# Patient Record
Sex: MALE | Race: WHITE | NOT HISPANIC OR LATINO | Employment: FULL TIME | URBAN - NONMETROPOLITAN AREA
[De-identification: names, ages, dates, MRNs, and addresses within clinical notes are randomized per-mention and may not be internally consistent; named-entity substitution may affect disease eponyms.]

---

## 2018-07-25 ENCOUNTER — OFFICE VISIT (OUTPATIENT)
Dept: MEDICAL GROUP | Facility: CLINIC | Age: 58
End: 2018-07-25
Payer: MEDICAID

## 2018-07-25 VITALS
BODY MASS INDEX: 23.62 KG/M2 | TEMPERATURE: 98.1 F | OXYGEN SATURATION: 95 % | RESPIRATION RATE: 18 BRPM | SYSTOLIC BLOOD PRESSURE: 124 MMHG | HEART RATE: 90 BPM | DIASTOLIC BLOOD PRESSURE: 84 MMHG | WEIGHT: 165 LBS | HEIGHT: 70 IN

## 2018-07-25 DIAGNOSIS — R03.0 ELEVATED BLOOD PRESSURE READING: ICD-10-CM

## 2018-07-25 DIAGNOSIS — M51.9 LUMBAR DISC DISEASE: ICD-10-CM

## 2018-07-25 DIAGNOSIS — Z12.11 COLON CANCER SCREENING: ICD-10-CM

## 2018-07-25 PROCEDURE — 99203 OFFICE O/P NEW LOW 30 MIN: CPT | Performed by: FAMILY MEDICINE

## 2018-07-25 RX ORDER — IBUPROFEN 200 MG
200 TABLET ORAL EVERY 6 HOURS PRN
Status: ON HOLD | COMMUNITY
End: 2018-09-20 | Stop reason: CLARIF

## 2018-07-25 ASSESSMENT — PATIENT HEALTH QUESTIONNAIRE - PHQ9
SUM OF ALL RESPONSES TO PHQ QUESTIONS 1-9: 3
5. POOR APPETITE OR OVEREATING: 0 - NOT AT ALL
CLINICAL INTERPRETATION OF PHQ2 SCORE: 2

## 2018-07-25 NOTE — PROGRESS NOTES
Complaint: High BP     Subjective:     Tomás Urrutia is a 58 y.o. male here today to establish care. Moved here from [facility] last winter. Currently working as  .    Lumbar disc disease  Has multilevel lumbar disc disease. Has been getting epidurals with relief, last one in [place] a couple weeks ago. Never seen by ortho or neurosurgery. Wants to know what other options are available apart from pain management. Has been off opiates for years. States he can't sit for hours, used to work as .    Elevated blood pressure reading  At last epidural procedure. BP found to be elevated 142-146/90-92. Was in lots of pain at the time.     Wants me to check a growth on right cheek.    Current medicines (including changes today)  Current Outpatient Prescriptions   Medication Sig Dispense Refill   • ibuprofen (MOTRIN) 200 MG Tab Take 200 mg by mouth every 6 hours as needed.       No current facility-administered medications for this visit.      He  has a past medical history of Back pain.    Health Maintenance: needs colonoscopy      Allergies: Patient has no known allergies.    Current Outpatient Prescriptions Ordered in [facility]   Medication Sig Dispense Refill   • ibuprofen (MOTRIN) 200 MG Tab Take 200 mg by mouth every 6 hours as needed.       No current Epic-ordered facility-administered medications on file.        Past Medical History:   Diagnosis Date   • Back pain        Past Surgical History:   Procedure Laterality Date   • OTHER ORTHOPEDIC SURGERY      tendon surgeries left hand       Social History   Substance Use Topics   • Smoking status: Never Smoker   • Smokeless tobacco: Never Used   • Alcohol use Yes      Comment: occasional       Social History     Social History Narrative   • No narrative on file       Family History   Problem Relation Age of Onset   • Stroke Father    • Stroke Paternal Grandmother          ROS   Patient denies any fever, chills, unintentional weight gain/loss, fatigue, stroke  "symptoms, dizziness, headache, nasal congestion, sore-throat, cough, heartburn, chest pain, difficulty breathing, abdominal discomfort, diarrhea/constipation, burning with urination or frequency, joint or back pain, skin rashes, depression or anxiety.       Objective:     Blood pressure 124/84, pulse 90, temperature 36.7 °C (98.1 °F), resp. rate 18, height 1.778 m (5' 10\"), weight 74.8 kg (165 lb), SpO2 95 %. Body mass index is 23.68 kg/m².   Physical Exam:  Constitutional: Alert, no distress.  Skin: Warm, dry, good turgor, no rashes in visible areas. 0.3 cm oval-shaped, raised pink lesion with some teleangiectases over right mandible.  Neck: Trachea midline, no masses, no thyromegaly. No cervical or supraclavicular lymphadenopathy  Respiratory: Unlabored respiratory effort, lungs clear to auscultation, no wheezes, no ronchi.  Cardiovascular: Normal S1, S2, no murmur, no extremity edema.  Psych: Alert and oriented x3, appropriate affect and mood.        Assessment and Plan:   The following treatment plan was discussed    1. Lumbar disc disease  Chronic problem. Will refer him to ortho. Should get 2nd opinion if operation advised. May need referral to PM& R as well. P  - REFERRAL TO ORTHOPEDICS    2. Colon cancer screening  Will notify with result.  - OCCULT BLOOD FECES IMMUNOASSAY (FIT); Future    3. Elevated blood pressure reading  New problem. Observe. Pt to monitor at rest (pharmacy, [facility]), report back if consistently elevated.     Pt to monitor lesion on cheek, return if grows in size or sxs appear, will then biopsy.    Followup: Return if symptoms worsen or fail to improve.    Please note that this dictation was created using voice recognition software. I have made every reasonable attempt to correct obvious errors, but I expect that there are errors of grammar and possibly content that I did not discover before finalizing the note.           "

## 2018-07-25 NOTE — ASSESSMENT & PLAN NOTE
Has multilevel lumbar disc disease. Has been getting epidurals with relief, last one in [place] a couple weeks ago. Never seen by ortho or neurosurgery. Wants to know what other options are available apart from pain management. Has been off opiates for years. States he can't sit for hours, used to work as .

## 2018-07-25 NOTE — ASSESSMENT & PLAN NOTE
At last epidural procedure. BP found to be elevated 142-146/90-92. Was in lots of pain at the time.

## 2018-08-01 ENCOUNTER — HOSPITAL ENCOUNTER (OUTPATIENT)
Facility: MEDICAL CENTER | Age: 58
End: 2018-08-01
Attending: FAMILY MEDICINE
Payer: MEDICAID

## 2018-08-01 PROCEDURE — 82274 ASSAY TEST FOR BLOOD FECAL: CPT

## 2018-08-06 DIAGNOSIS — Z12.11 COLON CANCER SCREENING: ICD-10-CM

## 2018-08-07 LAB — HEMOCCULT STL QL IA: NEGATIVE

## 2018-09-14 ENCOUNTER — HOSPITAL ENCOUNTER (OUTPATIENT)
Dept: RADIOLOGY | Facility: MEDICAL CENTER | Age: 58
DRG: 460 | End: 2018-09-14
Attending: ORTHOPAEDIC SURGERY | Admitting: ORTHOPAEDIC SURGERY
Payer: MEDICAID

## 2018-09-14 DIAGNOSIS — Z01.810 PRE-OPERATIVE CARDIOVASCULAR EXAMINATION: ICD-10-CM

## 2018-09-14 DIAGNOSIS — Z01.812 PRE-OPERATIVE LABORATORY EXAMINATION: ICD-10-CM

## 2018-09-14 DIAGNOSIS — Z01.811 PRE-OPERATIVE RESPIRATORY EXAMINATION: ICD-10-CM

## 2018-09-14 LAB
ANION GAP SERPL CALC-SCNC: 6 MMOL/L (ref 0–11.9)
APPEARANCE UR: CLEAR
APTT PPP: 27 SEC (ref 24.7–36)
BASOPHILS # BLD AUTO: 0.5 % (ref 0–1.8)
BASOPHILS # BLD: 0.03 K/UL (ref 0–0.12)
BILIRUB UR QL STRIP.AUTO: NEGATIVE
BUN SERPL-MCNC: 12 MG/DL (ref 8–22)
CALCIUM SERPL-MCNC: 9.7 MG/DL (ref 8.5–10.5)
CHLORIDE SERPL-SCNC: 102 MMOL/L (ref 96–112)
CO2 SERPL-SCNC: 30 MMOL/L (ref 20–33)
COLOR UR: YELLOW
CREAT SERPL-MCNC: 0.83 MG/DL (ref 0.5–1.4)
EKG IMPRESSION: NORMAL
EOSINOPHIL # BLD AUTO: 0.12 K/UL (ref 0–0.51)
EOSINOPHIL NFR BLD: 1.9 % (ref 0–6.9)
ERYTHROCYTE [DISTWIDTH] IN BLOOD BY AUTOMATED COUNT: 44.5 FL (ref 35.9–50)
ERYTHROCYTE [SEDIMENTATION RATE] IN BLOOD BY WESTERGREN METHOD: 2 MM/HOUR (ref 0–20)
GLUCOSE SERPL-MCNC: 90 MG/DL (ref 65–99)
GLUCOSE UR STRIP.AUTO-MCNC: NEGATIVE MG/DL
HCT VFR BLD AUTO: 44.4 % (ref 42–52)
HGB BLD-MCNC: 15.3 G/DL (ref 14–18)
IMM GRANULOCYTES # BLD AUTO: 0.02 K/UL (ref 0–0.11)
IMM GRANULOCYTES NFR BLD AUTO: 0.3 % (ref 0–0.9)
INR PPP: 0.92 (ref 0.87–1.13)
KETONES UR STRIP.AUTO-MCNC: NEGATIVE MG/DL
LEUKOCYTE ESTERASE UR QL STRIP.AUTO: NEGATIVE
LYMPHOCYTES # BLD AUTO: 1.94 K/UL (ref 1–4.8)
LYMPHOCYTES NFR BLD: 30.1 % (ref 22–41)
MCH RBC QN AUTO: 33.3 PG (ref 27–33)
MCHC RBC AUTO-ENTMCNC: 34.5 G/DL (ref 33.7–35.3)
MCV RBC AUTO: 96.7 FL (ref 81.4–97.8)
MICRO URNS: NORMAL
MONOCYTES # BLD AUTO: 0.7 K/UL (ref 0–0.85)
MONOCYTES NFR BLD AUTO: 10.9 % (ref 0–13.4)
NEUTROPHILS # BLD AUTO: 3.64 K/UL (ref 1.82–7.42)
NEUTROPHILS NFR BLD: 56.3 % (ref 44–72)
NITRITE UR QL STRIP.AUTO: NEGATIVE
NRBC # BLD AUTO: 0 K/UL
NRBC BLD-RTO: 0 /100 WBC
PH UR STRIP.AUTO: 6.5 [PH]
PLATELET # BLD AUTO: 210 K/UL (ref 164–446)
PMV BLD AUTO: 10 FL (ref 9–12.9)
POTASSIUM SERPL-SCNC: 4.2 MMOL/L (ref 3.6–5.5)
PROT UR QL STRIP: NEGATIVE MG/DL
PROTHROMBIN TIME: 12.1 SEC (ref 12–14.6)
RBC # BLD AUTO: 4.59 M/UL (ref 4.7–6.1)
RBC UR QL AUTO: NEGATIVE
SODIUM SERPL-SCNC: 138 MMOL/L (ref 135–145)
SP GR UR STRIP.AUTO: 1.01
UROBILINOGEN UR STRIP.AUTO-MCNC: 0.2 MG/DL
WBC # BLD AUTO: 6.5 K/UL (ref 4.8–10.8)

## 2018-09-14 PROCEDURE — 85652 RBC SED RATE AUTOMATED: CPT

## 2018-09-14 PROCEDURE — 80048 BASIC METABOLIC PNL TOTAL CA: CPT

## 2018-09-14 PROCEDURE — 93005 ELECTROCARDIOGRAM TRACING: CPT

## 2018-09-14 PROCEDURE — 36415 COLL VENOUS BLD VENIPUNCTURE: CPT

## 2018-09-14 PROCEDURE — 85730 THROMBOPLASTIN TIME PARTIAL: CPT

## 2018-09-14 PROCEDURE — 85025 COMPLETE CBC W/AUTO DIFF WBC: CPT

## 2018-09-14 PROCEDURE — 85610 PROTHROMBIN TIME: CPT

## 2018-09-14 PROCEDURE — 81003 URINALYSIS AUTO W/O SCOPE: CPT

## 2018-09-14 PROCEDURE — 71046 X-RAY EXAM CHEST 2 VIEWS: CPT

## 2018-09-14 PROCEDURE — 93010 ELECTROCARDIOGRAM REPORT: CPT | Performed by: INTERNAL MEDICINE

## 2018-09-14 RX ORDER — HYDROCODONE BITARTRATE AND ACETAMINOPHEN 10; 325 MG/1; MG/1
1-2 TABLET ORAL EVERY 6 HOURS PRN
Status: ON HOLD | COMMUNITY
End: 2018-09-23

## 2018-09-14 RX ORDER — ACETAMINOPHEN 500 MG
500 TABLET ORAL EVERY 6 HOURS PRN
COMMUNITY

## 2018-09-20 ENCOUNTER — APPOINTMENT (OUTPATIENT)
Dept: RADIOLOGY | Facility: MEDICAL CENTER | Age: 58
DRG: 460 | End: 2018-09-20
Attending: ORTHOPAEDIC SURGERY
Payer: MEDICAID

## 2018-09-20 ENCOUNTER — HOSPITAL ENCOUNTER (INPATIENT)
Facility: MEDICAL CENTER | Age: 58
LOS: 3 days | DRG: 460 | End: 2018-09-23
Attending: ORTHOPAEDIC SURGERY | Admitting: ORTHOPAEDIC SURGERY
Payer: MEDICAID

## 2018-09-20 DIAGNOSIS — M54.9 BACK PAIN WITHOUT RADIATION: ICD-10-CM

## 2018-09-20 DIAGNOSIS — M48.061 SPINAL STENOSIS OF LUMBAR REGION, UNSPECIFIED WHETHER NEUROGENIC CLAUDICATION PRESENT: ICD-10-CM

## 2018-09-20 DIAGNOSIS — G89.18 ACUTE POST-OPERATIVE PAIN: ICD-10-CM

## 2018-09-20 DIAGNOSIS — M51.9 LUMBAR DISC DISEASE: ICD-10-CM

## 2018-09-20 PROCEDURE — 160036 HCHG PACU - EA ADDL 30 MINS PHASE I: Performed by: ORTHOPAEDIC SURGERY

## 2018-09-20 PROCEDURE — 700105 HCHG RX REV CODE 258: Performed by: ORTHOPAEDIC SURGERY

## 2018-09-20 PROCEDURE — 501838 HCHG SUTURE GENERAL: Performed by: ORTHOPAEDIC SURGERY

## 2018-09-20 PROCEDURE — 700101 HCHG RX REV CODE 250

## 2018-09-20 PROCEDURE — 700101 HCHG RX REV CODE 250: Performed by: ORTHOPAEDIC SURGERY

## 2018-09-20 PROCEDURE — 160042 HCHG SURGERY MINUTES - EA ADDL 1 MIN LEVEL 5: Performed by: ORTHOPAEDIC SURGERY

## 2018-09-20 PROCEDURE — 72100 X-RAY EXAM L-S SPINE 2/3 VWS: CPT

## 2018-09-20 PROCEDURE — A9270 NON-COVERED ITEM OR SERVICE: HCPCS | Performed by: ORTHOPAEDIC SURGERY

## 2018-09-20 PROCEDURE — 700102 HCHG RX REV CODE 250 W/ 637 OVERRIDE(OP)

## 2018-09-20 PROCEDURE — 503051 HCHG CLOSURE PRINEO SKIN: Performed by: ORTHOPAEDIC SURGERY

## 2018-09-20 PROCEDURE — 07DR3ZZ EXTRACTION OF ILIAC BONE MARROW, PERCUTANEOUS APPROACH: ICD-10-PCS | Performed by: ORTHOPAEDIC SURGERY

## 2018-09-20 PROCEDURE — 95937 NEUROMUSCULAR JUNCTION TEST: CPT | Performed by: ORTHOPAEDIC SURGERY

## 2018-09-20 PROCEDURE — 0SG10AJ FUSION OF 2 OR MORE LUMBAR VERTEBRAL JOINTS WITH INTERBODY FUSION DEVICE, POSTERIOR APPROACH, ANTERIOR COLUMN, OPEN APPROACH: ICD-10-PCS | Performed by: ORTHOPAEDIC SURGERY

## 2018-09-20 PROCEDURE — 770006 HCHG ROOM/CARE - MED/SURG/GYN SEMI*

## 2018-09-20 PROCEDURE — A4450 NON-WATERPROOF TAPE: HCPCS | Performed by: ORTHOPAEDIC SURGERY

## 2018-09-20 PROCEDURE — 4A11X4G MONITORING OF PERIPHERAL NERVOUS ELECTRICAL ACTIVITY, INTRAOPERATIVE, EXTERNAL APPROACH: ICD-10-PCS | Performed by: ORTHOPAEDIC SURGERY

## 2018-09-20 PROCEDURE — A9270 NON-COVERED ITEM OR SERVICE: HCPCS | Performed by: ANESTHESIOLOGY

## 2018-09-20 PROCEDURE — 700101 HCHG RX REV CODE 250: Mod: JW

## 2018-09-20 PROCEDURE — 95925 SOMATOSENSORY TESTING: CPT | Performed by: ORTHOPAEDIC SURGERY

## 2018-09-20 PROCEDURE — 0ST20ZZ RESECTION OF LUMBAR VERTEBRAL DISC, OPEN APPROACH: ICD-10-PCS | Performed by: ORTHOPAEDIC SURGERY

## 2018-09-20 PROCEDURE — 160048 HCHG OR STATISTICAL LEVEL 1-5: Performed by: ORTHOPAEDIC SURGERY

## 2018-09-20 PROCEDURE — 700102 HCHG RX REV CODE 250 W/ 637 OVERRIDE(OP): Performed by: ORTHOPAEDIC SURGERY

## 2018-09-20 PROCEDURE — A4314 CATH W/DRAINAGE 2-WAY LATEX: HCPCS | Performed by: ORTHOPAEDIC SURGERY

## 2018-09-20 PROCEDURE — 500858 HCHG NEEDLE, KYPHOPLASTY 11GA: Performed by: ORTHOPAEDIC SURGERY

## 2018-09-20 PROCEDURE — A9270 NON-COVERED ITEM OR SERVICE: HCPCS

## 2018-09-20 PROCEDURE — 3E0234Z INTRODUCTION OF SERUM, TOXOID AND VACCINE INTO MUSCLE, PERCUTANEOUS APPROACH: ICD-10-PCS | Performed by: ORTHOPAEDIC SURGERY

## 2018-09-20 PROCEDURE — 700111 HCHG RX REV CODE 636 W/ 250 OVERRIDE (IP): Performed by: ORTHOPAEDIC SURGERY

## 2018-09-20 PROCEDURE — 160035 HCHG PACU - 1ST 60 MINS PHASE I: Performed by: ORTHOPAEDIC SURGERY

## 2018-09-20 PROCEDURE — 110454 HCHG SHELL REV 250: Performed by: ORTHOPAEDIC SURGERY

## 2018-09-20 PROCEDURE — 502000 HCHG MISC OR IMPLANTS RC 0278: Performed by: ORTHOPAEDIC SURGERY

## 2018-09-20 PROCEDURE — 01NB0ZZ RELEASE LUMBAR NERVE, OPEN APPROACH: ICD-10-PCS | Performed by: ORTHOPAEDIC SURGERY

## 2018-09-20 PROCEDURE — 500423 HCHG DRESSING, ABD COMBINE: Performed by: ORTHOPAEDIC SURGERY

## 2018-09-20 PROCEDURE — 51798 US URINE CAPACITY MEASURE: CPT

## 2018-09-20 PROCEDURE — 160002 HCHG RECOVERY MINUTES (STAT): Performed by: ORTHOPAEDIC SURGERY

## 2018-09-20 PROCEDURE — 700102 HCHG RX REV CODE 250 W/ 637 OVERRIDE(OP): Performed by: ANESTHESIOLOGY

## 2018-09-20 PROCEDURE — 500885 HCHG PACK, JACKSON TABLE: Performed by: ORTHOPAEDIC SURGERY

## 2018-09-20 PROCEDURE — 160009 HCHG ANES TIME/MIN: Performed by: ORTHOPAEDIC SURGERY

## 2018-09-20 PROCEDURE — 160031 HCHG SURGERY MINUTES - 1ST 30 MINS LEVEL 5: Performed by: ORTHOPAEDIC SURGERY

## 2018-09-20 PROCEDURE — 700111 HCHG RX REV CODE 636 W/ 250 OVERRIDE (IP): Performed by: ANESTHESIOLOGY

## 2018-09-20 PROCEDURE — 95940 IONM IN OPERATNG ROOM 15 MIN: CPT | Performed by: ORTHOPAEDIC SURGERY

## 2018-09-20 PROCEDURE — 700111 HCHG RX REV CODE 636 W/ 250 OVERRIDE (IP)

## 2018-09-20 PROCEDURE — A6223 GAUZE >16<=48 NO W/SAL W/O B: HCPCS | Performed by: ORTHOPAEDIC SURGERY

## 2018-09-20 PROCEDURE — 95861 NEEDLE EMG 2 EXTREMITIES: CPT | Performed by: ORTHOPAEDIC SURGERY

## 2018-09-20 PROCEDURE — 95909 NRV CNDJ TST 5-6 STUDIES: CPT | Performed by: ORTHOPAEDIC SURGERY

## 2018-09-20 RX ORDER — PROMETHAZINE HYDROCHLORIDE 25 MG/1
12.5-25 TABLET ORAL EVERY 4 HOURS PRN
Status: DISCONTINUED | OUTPATIENT
Start: 2018-09-20 | End: 2018-09-23 | Stop reason: HOSPADM

## 2018-09-20 RX ORDER — BUPIVACAINE HYDROCHLORIDE AND EPINEPHRINE 5; 5 MG/ML; UG/ML
INJECTION, SOLUTION EPIDURAL; INTRACAUDAL; PERINEURAL
Status: DISCONTINUED | OUTPATIENT
Start: 2018-09-20 | End: 2018-09-20 | Stop reason: HOSPADM

## 2018-09-20 RX ORDER — MORPHINE SULFATE 4 MG/ML
4 INJECTION, SOLUTION INTRAMUSCULAR; INTRAVENOUS
Status: DISCONTINUED | OUTPATIENT
Start: 2018-09-20 | End: 2018-09-21

## 2018-09-20 RX ORDER — ZOLPIDEM TARTRATE 5 MG/1
5 TABLET ORAL
Status: DISCONTINUED | OUTPATIENT
Start: 2018-09-21 | End: 2018-09-23 | Stop reason: HOSPADM

## 2018-09-20 RX ORDER — DIPHENHYDRAMINE HYDROCHLORIDE 50 MG/ML
12.5 INJECTION INTRAMUSCULAR; INTRAVENOUS
Status: DISCONTINUED | OUTPATIENT
Start: 2018-09-20 | End: 2018-09-20 | Stop reason: HOSPADM

## 2018-09-20 RX ORDER — HYDRALAZINE HYDROCHLORIDE 20 MG/ML
10 INJECTION INTRAMUSCULAR; INTRAVENOUS
Status: DISCONTINUED | OUTPATIENT
Start: 2018-09-20 | End: 2018-09-23 | Stop reason: HOSPADM

## 2018-09-20 RX ORDER — CEFAZOLIN SODIUM 2 G/100ML
2 INJECTION, SOLUTION INTRAVENOUS EVERY 8 HOURS
Status: COMPLETED | OUTPATIENT
Start: 2018-09-20 | End: 2018-09-21

## 2018-09-20 RX ORDER — CLONIDINE HYDROCHLORIDE 0.1 MG/1
0.1 TABLET ORAL EVERY 4 HOURS PRN
Status: DISCONTINUED | OUTPATIENT
Start: 2018-09-20 | End: 2018-09-23 | Stop reason: HOSPADM

## 2018-09-20 RX ORDER — ONDANSETRON 4 MG/1
4 TABLET, ORALLY DISINTEGRATING ORAL EVERY 4 HOURS PRN
Status: DISCONTINUED | OUTPATIENT
Start: 2018-09-20 | End: 2018-09-23 | Stop reason: HOSPADM

## 2018-09-20 RX ORDER — OXYCODONE HYDROCHLORIDE 10 MG/1
10 TABLET ORAL
Status: DISCONTINUED | OUTPATIENT
Start: 2018-09-20 | End: 2018-09-20

## 2018-09-20 RX ORDER — ENEMA 19; 7 G/133ML; G/133ML
1 ENEMA RECTAL
Status: DISCONTINUED | OUTPATIENT
Start: 2018-09-20 | End: 2018-09-23 | Stop reason: HOSPADM

## 2018-09-20 RX ORDER — KETOROLAC TROMETHAMINE 30 MG/ML
30 INJECTION, SOLUTION INTRAMUSCULAR; INTRAVENOUS EVERY 6 HOURS
Status: DISCONTINUED | OUTPATIENT
Start: 2018-09-21 | End: 2018-09-23 | Stop reason: HOSPADM

## 2018-09-20 RX ORDER — CELECOXIB 200 MG/1
CAPSULE ORAL
Status: COMPLETED
Start: 2018-09-20 | End: 2018-09-20

## 2018-09-20 RX ORDER — DIPHENHYDRAMINE HYDROCHLORIDE 50 MG/ML
25 INJECTION INTRAMUSCULAR; INTRAVENOUS EVERY 6 HOURS PRN
Status: DISCONTINUED | OUTPATIENT
Start: 2018-09-20 | End: 2018-09-23 | Stop reason: HOSPADM

## 2018-09-20 RX ORDER — ACETAMINOPHEN 500 MG
1000 TABLET ORAL ONCE
Status: COMPLETED | OUTPATIENT
Start: 2018-09-20 | End: 2018-09-20

## 2018-09-20 RX ORDER — BISACODYL 10 MG
10 SUPPOSITORY, RECTAL RECTAL
Status: DISCONTINUED | OUTPATIENT
Start: 2018-09-20 | End: 2018-09-23 | Stop reason: HOSPADM

## 2018-09-20 RX ORDER — SODIUM CHLORIDE, SODIUM LACTATE, POTASSIUM CHLORIDE, AND CALCIUM CHLORIDE .6; .31; .03; .02 G/100ML; G/100ML; G/100ML; G/100ML
IRRIGANT IRRIGATION
Status: DISCONTINUED | OUTPATIENT
Start: 2018-09-20 | End: 2018-09-20 | Stop reason: HOSPADM

## 2018-09-20 RX ORDER — LABETALOL HYDROCHLORIDE 5 MG/ML
5 INJECTION, SOLUTION INTRAVENOUS
Status: DISCONTINUED | OUTPATIENT
Start: 2018-09-20 | End: 2018-09-20 | Stop reason: HOSPADM

## 2018-09-20 RX ORDER — GABAPENTIN 300 MG/1
CAPSULE ORAL
Status: COMPLETED
Start: 2018-09-20 | End: 2018-09-20

## 2018-09-20 RX ORDER — OXYCODONE HCL 5 MG/5 ML
5 SOLUTION, ORAL ORAL
Status: COMPLETED | OUTPATIENT
Start: 2018-09-20 | End: 2018-09-20

## 2018-09-20 RX ORDER — LIDOCAINE HYDROCHLORIDE 10 MG/ML
INJECTION, SOLUTION INFILTRATION; PERINEURAL
Status: COMPLETED
Start: 2018-09-20 | End: 2018-09-20

## 2018-09-20 RX ORDER — AMOXICILLIN 250 MG
1 CAPSULE ORAL
Status: DISCONTINUED | OUTPATIENT
Start: 2018-09-20 | End: 2018-09-23 | Stop reason: HOSPADM

## 2018-09-20 RX ORDER — ONDANSETRON 2 MG/ML
4 INJECTION INTRAMUSCULAR; INTRAVENOUS
Status: DISCONTINUED | OUTPATIENT
Start: 2018-09-20 | End: 2018-09-20 | Stop reason: HOSPADM

## 2018-09-20 RX ORDER — NALOXONE HYDROCHLORIDE 0.4 MG/ML
0.1 INJECTION, SOLUTION INTRAMUSCULAR; INTRAVENOUS; SUBCUTANEOUS PRN
Status: DISCONTINUED | OUTPATIENT
Start: 2018-09-20 | End: 2018-09-20 | Stop reason: HOSPADM

## 2018-09-20 RX ORDER — DIPHENHYDRAMINE HCL 25 MG
25 TABLET ORAL EVERY 6 HOURS PRN
Status: DISCONTINUED | OUTPATIENT
Start: 2018-09-20 | End: 2018-09-23 | Stop reason: HOSPADM

## 2018-09-20 RX ORDER — MIDAZOLAM HYDROCHLORIDE 1 MG/ML
1 INJECTION INTRAMUSCULAR; INTRAVENOUS
Status: DISCONTINUED | OUTPATIENT
Start: 2018-09-20 | End: 2018-09-20 | Stop reason: HOSPADM

## 2018-09-20 RX ORDER — ONDANSETRON 2 MG/ML
4 INJECTION INTRAMUSCULAR; INTRAVENOUS EVERY 4 HOURS PRN
Status: DISCONTINUED | OUTPATIENT
Start: 2018-09-20 | End: 2018-09-23 | Stop reason: HOSPADM

## 2018-09-20 RX ORDER — POLYETHYLENE GLYCOL 3350 17 G/17G
1 POWDER, FOR SOLUTION ORAL 2 TIMES DAILY PRN
Status: DISCONTINUED | OUTPATIENT
Start: 2018-09-20 | End: 2018-09-23 | Stop reason: HOSPADM

## 2018-09-20 RX ORDER — DEXTROSE MONOHYDRATE, SODIUM CHLORIDE, AND POTASSIUM CHLORIDE 50; 1.49; 4.5 G/1000ML; G/1000ML; G/1000ML
INJECTION, SOLUTION INTRAVENOUS CONTINUOUS
Status: DISCONTINUED | OUTPATIENT
Start: 2018-09-20 | End: 2018-09-23 | Stop reason: HOSPADM

## 2018-09-20 RX ORDER — HYDROMORPHONE HYDROCHLORIDE 2 MG/ML
0.2 INJECTION, SOLUTION INTRAMUSCULAR; INTRAVENOUS; SUBCUTANEOUS
Status: DISCONTINUED | OUTPATIENT
Start: 2018-09-20 | End: 2018-09-20 | Stop reason: HOSPADM

## 2018-09-20 RX ORDER — CALCIUM CARBONATE 500 MG/1
1000 TABLET, CHEWABLE ORAL 2 TIMES DAILY
Status: DISCONTINUED | OUTPATIENT
Start: 2018-09-20 | End: 2018-09-23 | Stop reason: HOSPADM

## 2018-09-20 RX ORDER — AMOXICILLIN 250 MG
1 CAPSULE ORAL NIGHTLY
Status: DISCONTINUED | OUTPATIENT
Start: 2018-09-20 | End: 2018-09-23 | Stop reason: HOSPADM

## 2018-09-20 RX ORDER — DEXAMETHASONE SODIUM PHOSPHATE 4 MG/ML
6 INJECTION, SOLUTION INTRA-ARTICULAR; INTRALESIONAL; INTRAMUSCULAR; INTRAVENOUS; SOFT TISSUE EVERY 6 HOURS PRN
Status: DISCONTINUED | OUTPATIENT
Start: 2018-09-20 | End: 2018-09-23 | Stop reason: HOSPADM

## 2018-09-20 RX ORDER — BACITRACIN 50000 [IU]/1
INJECTION, POWDER, FOR SOLUTION INTRAMUSCULAR
Status: DISCONTINUED | OUTPATIENT
Start: 2018-09-20 | End: 2018-09-20 | Stop reason: HOSPADM

## 2018-09-20 RX ORDER — DOCUSATE SODIUM 100 MG/1
100 CAPSULE, LIQUID FILLED ORAL 2 TIMES DAILY
Status: DISCONTINUED | OUTPATIENT
Start: 2018-09-20 | End: 2018-09-23 | Stop reason: HOSPADM

## 2018-09-20 RX ORDER — HYDROMORPHONE HYDROCHLORIDE 2 MG/ML
0.1 INJECTION, SOLUTION INTRAMUSCULAR; INTRAVENOUS; SUBCUTANEOUS
Status: DISCONTINUED | OUTPATIENT
Start: 2018-09-20 | End: 2018-09-20 | Stop reason: HOSPADM

## 2018-09-20 RX ORDER — MORPHINE SULFATE 4 MG/ML
4 INJECTION, SOLUTION INTRAMUSCULAR; INTRAVENOUS ONCE
Status: DISCONTINUED | OUTPATIENT
Start: 2018-09-20 | End: 2018-09-20

## 2018-09-20 RX ORDER — SODIUM CHLORIDE, SODIUM LACTATE, POTASSIUM CHLORIDE, CALCIUM CHLORIDE 600; 310; 30; 20 MG/100ML; MG/100ML; MG/100ML; MG/100ML
INJECTION, SOLUTION INTRAVENOUS CONTINUOUS
Status: ACTIVE | OUTPATIENT
Start: 2018-09-20 | End: 2018-09-20

## 2018-09-20 RX ORDER — CELECOXIB 200 MG/1
400 CAPSULE ORAL ONCE
Status: COMPLETED | OUTPATIENT
Start: 2018-09-20 | End: 2018-09-20

## 2018-09-20 RX ORDER — HYDROMORPHONE HYDROCHLORIDE 2 MG/ML
0.4 INJECTION, SOLUTION INTRAMUSCULAR; INTRAVENOUS; SUBCUTANEOUS
Status: DISCONTINUED | OUTPATIENT
Start: 2018-09-20 | End: 2018-09-20 | Stop reason: HOSPADM

## 2018-09-20 RX ORDER — LIDOCAINE HYDROCHLORIDE 10 MG/ML
0.5 INJECTION, SOLUTION INFILTRATION; PERINEURAL
Status: DISCONTINUED | OUTPATIENT
Start: 2018-09-20 | End: 2018-09-20 | Stop reason: HOSPADM

## 2018-09-20 RX ORDER — ACETAMINOPHEN 500 MG
1000 TABLET ORAL EVERY 6 HOURS
Status: DISCONTINUED | OUTPATIENT
Start: 2018-09-20 | End: 2018-09-23 | Stop reason: HOSPADM

## 2018-09-20 RX ORDER — OXYCODONE HCL 5 MG/5 ML
10 SOLUTION, ORAL ORAL
Status: COMPLETED | OUTPATIENT
Start: 2018-09-20 | End: 2018-09-20

## 2018-09-20 RX ORDER — OXYCODONE HYDROCHLORIDE 10 MG/1
20 TABLET ORAL
Status: DISCONTINUED | OUTPATIENT
Start: 2018-09-20 | End: 2018-09-20

## 2018-09-20 RX ORDER — GABAPENTIN 300 MG/1
300 CAPSULE ORAL
Status: COMPLETED | OUTPATIENT
Start: 2018-09-20 | End: 2018-09-20

## 2018-09-20 RX ORDER — ACETAMINOPHEN 500 MG
TABLET ORAL
Status: COMPLETED
Start: 2018-09-20 | End: 2018-09-20

## 2018-09-20 RX ORDER — LABETALOL HYDROCHLORIDE 5 MG/ML
10 INJECTION, SOLUTION INTRAVENOUS
Status: DISCONTINUED | OUTPATIENT
Start: 2018-09-20 | End: 2018-09-23 | Stop reason: HOSPADM

## 2018-09-20 RX ORDER — MEPERIDINE HYDROCHLORIDE 25 MG/ML
6.25 INJECTION INTRAMUSCULAR; INTRAVENOUS; SUBCUTANEOUS
Status: DISCONTINUED | OUTPATIENT
Start: 2018-09-20 | End: 2018-09-20 | Stop reason: HOSPADM

## 2018-09-20 RX ORDER — DIAZEPAM 5 MG/1
5 TABLET ORAL EVERY 6 HOURS PRN
Status: DISCONTINUED | OUTPATIENT
Start: 2018-09-20 | End: 2018-09-23 | Stop reason: HOSPADM

## 2018-09-20 RX ORDER — PROMETHAZINE HYDROCHLORIDE 12.5 MG/1
12.5-25 SUPPOSITORY RECTAL EVERY 4 HOURS PRN
Status: DISCONTINUED | OUTPATIENT
Start: 2018-09-20 | End: 2018-09-23 | Stop reason: HOSPADM

## 2018-09-20 RX ORDER — HALOPERIDOL 5 MG/ML
1 INJECTION INTRAMUSCULAR
Status: DISCONTINUED | OUTPATIENT
Start: 2018-09-20 | End: 2018-09-20 | Stop reason: HOSPADM

## 2018-09-20 RX ADMIN — SODIUM CHLORIDE, SODIUM LACTATE, POTASSIUM CHLORIDE, CALCIUM CHLORIDE: 600; 310; 30; 20 INJECTION, SOLUTION INTRAVENOUS at 06:32

## 2018-09-20 RX ADMIN — TRANEXAMIC ACID 1000 MG: 100 INJECTION, SOLUTION INTRAVENOUS at 14:06

## 2018-09-20 RX ADMIN — FENTANYL CITRATE 50 MCG: 50 INJECTION, SOLUTION INTRAMUSCULAR; INTRAVENOUS at 14:04

## 2018-09-20 RX ADMIN — HYDROMORPHONE HYDROCHLORIDE 0.4 MG: 2 INJECTION INTRAMUSCULAR; INTRAVENOUS; SUBCUTANEOUS at 15:45

## 2018-09-20 RX ADMIN — CELECOXIB 400 MG: 200 CAPSULE ORAL at 07:36

## 2018-09-20 RX ADMIN — ACETAMINOPHEN 1000 MG: 500 TABLET, FILM COATED ORAL at 07:37

## 2018-09-20 RX ADMIN — GABAPENTIN 300 MG: 300 CAPSULE ORAL at 07:37

## 2018-09-20 RX ADMIN — ONDANSETRON 4 MG: 2 INJECTION INTRAMUSCULAR; INTRAVENOUS at 19:58

## 2018-09-20 RX ADMIN — HYDROMORPHONE HYDROCHLORIDE 0.4 MG: 2 INJECTION INTRAMUSCULAR; INTRAVENOUS; SUBCUTANEOUS at 15:15

## 2018-09-20 RX ADMIN — LIDOCAINE HYDROCHLORIDE 0.5 ML: 10 INJECTION, SOLUTION INFILTRATION; PERINEURAL at 06:31

## 2018-09-20 RX ADMIN — MORPHINE SULFATE: 50 INJECTION, SOLUTION, CONCENTRATE INTRAVENOUS at 16:01

## 2018-09-20 RX ADMIN — OXYCODONE HYDROCHLORIDE 10 MG: 5 SOLUTION ORAL at 13:58

## 2018-09-20 RX ADMIN — POTASSIUM CHLORIDE, DEXTROSE MONOHYDRATE AND SODIUM CHLORIDE: 150; 5; 450 INJECTION, SOLUTION INTRAVENOUS at 19:07

## 2018-09-20 RX ADMIN — Medication 1000 MG: at 07:37

## 2018-09-20 RX ADMIN — FENTANYL CITRATE 50 MCG: 50 INJECTION, SOLUTION INTRAMUSCULAR; INTRAVENOUS at 13:57

## 2018-09-20 RX ADMIN — HYDROMORPHONE HYDROCHLORIDE 0.4 MG: 2 INJECTION INTRAMUSCULAR; INTRAVENOUS; SUBCUTANEOUS at 15:20

## 2018-09-20 RX ADMIN — CEFAZOLIN SODIUM 2 G: 2 INJECTION, SOLUTION INTRAVENOUS at 19:07

## 2018-09-20 RX ADMIN — ACETAMINOPHEN 1000 MG: 500 TABLET, FILM COATED ORAL at 19:08

## 2018-09-20 RX ADMIN — HYDROMORPHONE HYDROCHLORIDE 0.4 MG: 2 INJECTION INTRAMUSCULAR; INTRAVENOUS; SUBCUTANEOUS at 15:03

## 2018-09-20 RX ADMIN — HYDROMORPHONE HYDROCHLORIDE 0.4 MG: 2 INJECTION INTRAMUSCULAR; INTRAVENOUS; SUBCUTANEOUS at 15:08

## 2018-09-20 ASSESSMENT — PAIN SCALES - GENERAL
PAINLEVEL_OUTOF10: 0
PAINLEVEL_OUTOF10: 0
PAINLEVEL_OUTOF10: 8
PAINLEVEL_OUTOF10: ASSUMED PAIN PRESENT
PAINLEVEL_OUTOF10: 9
PAINLEVEL_OUTOF10: 0
PAINLEVEL_OUTOF10: 8
PAINLEVEL_OUTOF10: 8
PAINLEVEL_OUTOF10: 0
PAINLEVEL_OUTOF10: 0
PAINLEVEL_OUTOF10: 3
PAINLEVEL_OUTOF10: 8
PAINLEVEL_OUTOF10: 5
PAINLEVEL_OUTOF10: 8
PAINLEVEL_OUTOF10: 4

## 2018-09-20 ASSESSMENT — COGNITIVE AND FUNCTIONAL STATUS - GENERAL
SUGGESTED CMS G CODE MODIFIER DAILY ACTIVITY: CH
WALKING IN HOSPITAL ROOM: A LITTLE
STANDING UP FROM CHAIR USING ARMS: A LITTLE
DAILY ACTIVITIY SCORE: 24
SUGGESTED CMS G CODE MODIFIER MOBILITY: CK
MOVING TO AND FROM BED TO CHAIR: A LITTLE
MOVING FROM LYING ON BACK TO SITTING ON SIDE OF FLAT BED: A LITTLE
CLIMB 3 TO 5 STEPS WITH RAILING: A LITTLE
TURNING FROM BACK TO SIDE WHILE IN FLAT BAD: A LITTLE
MOBILITY SCORE: 18

## 2018-09-20 ASSESSMENT — LIFESTYLE VARIABLES
EVER FELT BAD OR GUILTY ABOUT YOUR DRINKING: NO
HAVE PEOPLE ANNOYED YOU BY CRITICIZING YOUR DRINKING: NO
EVER_SMOKED: NEVER
HOW MANY TIMES IN THE PAST YEAR HAVE YOU HAD 5 OR MORE DRINKS IN A DAY: 0
CONSUMPTION TOTAL: NEGATIVE
ALCOHOL_USE: YES
HAVE YOU EVER FELT YOU SHOULD CUT DOWN ON YOUR DRINKING: NO
TOTAL SCORE: 0
EVER HAD A DRINK FIRST THING IN THE MORNING TO STEADY YOUR NERVES TO GET RID OF A HANGOVER: NO
AVERAGE NUMBER OF DAYS PER WEEK YOU HAVE A DRINK CONTAINING ALCOHOL: 3
ON A TYPICAL DAY WHEN YOU DRINK ALCOHOL HOW MANY DRINKS DO YOU HAVE: 1

## 2018-09-20 ASSESSMENT — PATIENT HEALTH QUESTIONNAIRE - PHQ9
SUM OF ALL RESPONSES TO PHQ9 QUESTIONS 1 AND 2: 0
1. LITTLE INTEREST OR PLEASURE IN DOING THINGS: NOT AT ALL
2. FEELING DOWN, DEPRESSED, IRRITABLE, OR HOPELESS: NOT AT ALL

## 2018-09-20 NOTE — OP REPORT
DATE OF SERVICE:  09/20/2018    SERVICE:  Orthopedic spine surgery.    PREOPERATIVE DIAGNOSES:  1.  L3-L5 spondylolisthesis.  2.  L3-L5 severe degenerative disk disease.  3.  L2-S1 lumbar spinal stenosis.  4.  L2-S1 lumbar radiculopathy.    POSTOPERATIVE DIAGNOSES:  1.  L3-L5 spondylolisthesis.  2.  L3-L5 severe degenerative disk disease.  3.  L2-S1 lumbar spinal stenosis.  4.  L2-S1 lumbar radiculopathy.    PROCEDURES:  1.  L3-L4 and L4-L5 transforaminal lumbar interbody instrumented fusion using   [place] spine titanium pedicle screw instrumentation and 2 PEEK bone cages at   each level in the interbody space.  2.  Decompressive laminectomy with foraminotomies, L2-S1.  3.  Complete facetectomies, L3-L4 and L4-L5.  4.  Left-sided transforaminal transpedicular approach decompression at L3-L4   on the left and L4-L5 on the left.  5.  Aspiration of right posterior iliac crest bone marrow for beta tricalcium   phosphate bone graft extender.  6.  Use of local autograft bone.  7.  Use of allograft bone.  8.  Use of beta tricalcium phosphate bone graft extender.  9.  Greater than 1 hour use of operating room fluoroscopy.  10.  Use of NMA spinal cord monitoring with running electromyography,   somatosensory evoked potentials and pedicle screw testing.    SURGEON:  Romain Sanders MD    ASSISTANT SURGEON:  Logan Kim PA-C, certified first assist.    ANESTHETIC:  General.    ANESTHESIOLOGIST:  Colin Francis MD    COMPLICATIONS:  None.    BLOOD LOSS:  150 mL.    DESCRIPTION OF PROCEDURE:  After informed consent was obtained, patient was   brought to the operating room and given general endotracheal anesthetic.  His   Gilbert catheter was placed along with NMA spinal cord monitoring needles and   the patient was turned into the prone position and prepped and draped in the   usual sterile fashion after Ancef, vancomycin, and tranexamic acid had been   given.  After the field was draped, a time-out was called  correctly   identifying the patient and the procedure to be done.  We then injected the   area of the incision with 10 mL of 0.5% Marcaine with epinephrine.  We then   made a longitudinal midline incision from L2-S1.  We carefully dissected down   through subcutaneous tissue to the fascial layer.  We then advanced a Jamshidi   to the posterior iliac crest and harvested approximately 10 mL of bone marrow   from the iliac crest.  This bone marrow was mixed with beta tricalcium   phosphate bone graft extender.  We then incised the fascia and dissected   subperiosteally on both the left and the right side exposing the lamina of   L2-S1 bilaterally.  We then dissected deeper and laterally exposing the   transverse processes of L3-L5 bilaterally.  We then proceeded to place pedicle   screws in standard fashion using fluoroscopic guidance.    With each screw, we first used a Midas Jesus to create a starting hole.  We then   used the gearshift tap and the appropriate length of screw was placed.  All   screws were then tested using NMA spinal cord monitoring pedicle screw testing   system and were noted to be in the highest range of impedance.  A Midas Jesus   was then used to decorticate the transverse processes of L3 through L5   bilaterally.    After irrigating with copious amounts of irrigation, we then turned our   attention to doing the decompression.  We decompressed from L2-S1.  We removed   the spinous processes completely removed the lamina and removed the   ligamentum flavum and then decompression of the lateral gutters.  We did   complete facetectomies at L3-L4 and L4-L5 bilaterally.  We did foraminotomy of   S1 and L2 as well.  This was done using the Kerrison 3 rongeurs and the   Kerrison rongeurs.  When we completed our decompression, the entire central   canal, the foramina and lateral gutters were completely decompressed.  We then   irrigated with copious amounts of pulsatile lavage irrigation.    We then  performed the interbody fusion portion of the operation.  We first   approached at L4-L5.  Left-sided transforaminal transpedicular approach   decompression was used to the disk space.  The disk was incised, evacuated,   the endplates decorticated and 2 cages were placed in the interbody space and   their position was checked using C-arm fluoroscopy and noted to be excellent.    We then did the same sequence of events at L3-L4 again approaching from the   left side.  We did a transforaminal transpedicular approach decompression to   the disk space.  The disk was incised, evacuated, the endplates decorticated   and 2 cages were placed in the interbody space.  Their position was again   noted to be excellent via AP and lateral C-arm fluoroscopy.  After once again   irrigating with copious amounts of irrigation, we then placed the rest of the   hardware.  Two rods were selected and secured down with inner set screws.  We   did distract on both sides slightly approximately 2-3 mm to increase superior   inferior diameter of the foramina.  All set screws were tightened down with   the torque wrench and tested twice.  The tops of the screws were then broken   off as recommended by .  A crosslink was then placed in all 3   fixtures and the crosslink were tightened securely.  Digital traction was   placed on the crosslink to make sure it was indeed secure.  We then irrigated   one last time and then placed bone graft in the lateral gutters from L3-L5.    This consisted of local autograft bone, allograft bone, and beta tricalcium   phosphate bone graft extender.  The wound was then closed in layers over 1000   mg of vancomycin powder distributed equally below and above the fascia.  The   fascial layer was closed with interrupted #1 Vicryl suture.  Subcutaneous   tissue was closed with 2-0 Vicryl and skin was closed with a Dermabond mesh   Prineo closure.  We did inject 20 mL of 0.5% Marcaine with epinephrine as    local anesthetic.  Wound dressing was applied and the procedure was   terminated.  No complications were experienced.  Blood loss was minimal.  NMA   spinal cord monitoring signals were stable throughout the entire operation.    Patient was aroused from general anesthesia and brought in stable condition to   the recovery room.       ____________________________________     MD FANNY HUSTON / [facility]ST    DD:  09/20/2018 12:34:23  DT:  09/20/2018 14:11:17    D#:  [id number]  Job#:  [id number]

## 2018-09-20 NOTE — OR SURGEON
Immediate Post OP Note    PreOp Diagnosis: L3-5 spondylolisthesis L2-s1 stenosis    PostOp Diagnosis: same    Procedure(s):  TRANSFORAMINAL LUMBAR 3 - 5  INTERBODY FUSION - Wound Class: Clean with Drain  LUMBAR 2- SACRAL 1   DECOMPRESSION - Wound Class: Clean with Drain    Surgeon(s):  Romain Sanders M.D.    Anesthesiologist/Type of Anesthesia:  Anesthesiologist: Colin Francis M.D./General    Surgical Staff:  Assistant: SERGIO Hopper  Circulator: Yuko Maynard R.N.  Relief Circulator: Betty Min R.N.  Relief Scrub: Radha Mitchell  Scrub Person: Domonique Siddiqui [place]: Ileana Santamaria R.N.  Radiology Technologist: Mira Sood    Specimens removed if any:  none    Estimated Blood Loss: 150cc    Findings: none    Complications: none        9/20/2018 12:25 PM Romain Sanders M.D.

## 2018-09-20 NOTE — OR NURSING
Pt sleeping in stable condition.  1630: Pt resting in position of comfort; family at bedside; pt using PCA well.

## 2018-09-20 NOTE — PROGRESS NOTES
Med rec updated and complete  Allergies reviewed  Pt reports no antibiotics in the last 30 days.

## 2018-09-21 LAB
ERYTHROCYTE [DISTWIDTH] IN BLOOD BY AUTOMATED COUNT: 44.3 FL (ref 35.9–50)
HCT VFR BLD AUTO: 33.5 % (ref 42–52)
HGB BLD-MCNC: 11.5 G/DL (ref 14–18)
MCH RBC QN AUTO: 33.4 PG (ref 27–33)
MCHC RBC AUTO-ENTMCNC: 34.3 G/DL (ref 33.7–35.3)
MCV RBC AUTO: 97.4 FL (ref 81.4–97.8)
PLATELET # BLD AUTO: 168 K/UL (ref 164–446)
PMV BLD AUTO: 10.5 FL (ref 9–12.9)
RBC # BLD AUTO: 3.44 M/UL (ref 4.7–6.1)
WBC # BLD AUTO: 12.1 K/UL (ref 4.8–10.8)

## 2018-09-21 PROCEDURE — A9270 NON-COVERED ITEM OR SERVICE: HCPCS | Performed by: ORTHOPAEDIC SURGERY

## 2018-09-21 PROCEDURE — G8979 MOBILITY GOAL STATUS: HCPCS | Mod: CI

## 2018-09-21 PROCEDURE — 97165 OT EVAL LOW COMPLEX 30 MIN: CPT

## 2018-09-21 PROCEDURE — G8978 MOBILITY CURRENT STATUS: HCPCS | Mod: CI

## 2018-09-21 PROCEDURE — 700102 HCHG RX REV CODE 250 W/ 637 OVERRIDE(OP): Performed by: ORTHOPAEDIC SURGERY

## 2018-09-21 PROCEDURE — 97161 PT EVAL LOW COMPLEX 20 MIN: CPT

## 2018-09-21 PROCEDURE — 770006 HCHG ROOM/CARE - MED/SURG/GYN SEMI*

## 2018-09-21 PROCEDURE — 700101 HCHG RX REV CODE 250: Performed by: ORTHOPAEDIC SURGERY

## 2018-09-21 PROCEDURE — 700112 HCHG RX REV CODE 229: Performed by: ORTHOPAEDIC SURGERY

## 2018-09-21 PROCEDURE — 90686 IIV4 VACC NO PRSV 0.5 ML IM: CPT | Performed by: ORTHOPAEDIC SURGERY

## 2018-09-21 PROCEDURE — 85027 COMPLETE CBC AUTOMATED: CPT

## 2018-09-21 PROCEDURE — 90471 IMMUNIZATION ADMIN: CPT

## 2018-09-21 PROCEDURE — 36415 COLL VENOUS BLD VENIPUNCTURE: CPT

## 2018-09-21 PROCEDURE — 700102 HCHG RX REV CODE 250 W/ 637 OVERRIDE(OP): Performed by: PHYSICIAN ASSISTANT

## 2018-09-21 PROCEDURE — A9270 NON-COVERED ITEM OR SERVICE: HCPCS | Performed by: PHYSICIAN ASSISTANT

## 2018-09-21 PROCEDURE — 700102 HCHG RX REV CODE 250 W/ 637 OVERRIDE(OP)

## 2018-09-21 PROCEDURE — G8988 SELF CARE GOAL STATUS: HCPCS | Mod: CI

## 2018-09-21 PROCEDURE — 700111 HCHG RX REV CODE 636 W/ 250 OVERRIDE (IP): Performed by: ORTHOPAEDIC SURGERY

## 2018-09-21 PROCEDURE — G8989 SELF CARE D/C STATUS: HCPCS | Mod: CI

## 2018-09-21 PROCEDURE — G8987 SELF CARE CURRENT STATUS: HCPCS | Mod: CI

## 2018-09-21 PROCEDURE — 97535 SELF CARE MNGMENT TRAINING: CPT

## 2018-09-21 RX ORDER — SILICONES/ADHESIVE TAPE
COMBINATION PACKAGE (EA) TOPICAL 3 TIMES DAILY
Status: DISCONTINUED | OUTPATIENT
Start: 2018-09-21 | End: 2018-09-23 | Stop reason: HOSPADM

## 2018-09-21 RX ORDER — OXYCODONE HYDROCHLORIDE 10 MG/1
10 TABLET ORAL
Status: DISCONTINUED | OUTPATIENT
Start: 2018-09-21 | End: 2018-09-23 | Stop reason: HOSPADM

## 2018-09-21 RX ORDER — MORPHINE SULFATE 4 MG/ML
4 INJECTION, SOLUTION INTRAMUSCULAR; INTRAVENOUS
Status: DISCONTINUED | OUTPATIENT
Start: 2018-09-21 | End: 2018-09-23 | Stop reason: HOSPADM

## 2018-09-21 RX ORDER — OXYCODONE HYDROCHLORIDE 10 MG/1
20 TABLET ORAL
Status: DISCONTINUED | OUTPATIENT
Start: 2018-09-21 | End: 2018-09-23 | Stop reason: HOSPADM

## 2018-09-21 RX ADMIN — CEFAZOLIN SODIUM 2 G: 2 INJECTION, SOLUTION INTRAVENOUS at 00:38

## 2018-09-21 RX ADMIN — KETOROLAC TROMETHAMINE 30 MG: 30 INJECTION, SOLUTION INTRAMUSCULAR; INTRAVENOUS at 05:05

## 2018-09-21 RX ADMIN — DOCUSATE SODIUM 100 MG: 100 CAPSULE, LIQUID FILLED ORAL at 18:34

## 2018-09-21 RX ADMIN — OXYCODONE HYDROCHLORIDE 10 MG: 10 TABLET ORAL at 19:44

## 2018-09-21 RX ADMIN — ONDANSETRON 4 MG: 2 INJECTION INTRAMUSCULAR; INTRAVENOUS at 10:18

## 2018-09-21 RX ADMIN — POTASSIUM CHLORIDE, DEXTROSE MONOHYDRATE AND SODIUM CHLORIDE: 150; 5; 450 INJECTION, SOLUTION INTRAVENOUS at 15:31

## 2018-09-21 RX ADMIN — DIAZEPAM 5 MG: 5 TABLET ORAL at 00:37

## 2018-09-21 RX ADMIN — Medication 1 EACH: at 19:39

## 2018-09-21 RX ADMIN — DIAZEPAM 5 MG: 5 TABLET ORAL at 23:36

## 2018-09-21 RX ADMIN — ANTACID TABLETS 1000 MG: 500 TABLET, CHEWABLE ORAL at 05:07

## 2018-09-21 RX ADMIN — ACETAMINOPHEN 1000 MG: 500 TABLET, FILM COATED ORAL at 05:06

## 2018-09-21 RX ADMIN — POTASSIUM CHLORIDE, DEXTROSE MONOHYDRATE AND SODIUM CHLORIDE: 150; 5; 450 INJECTION, SOLUTION INTRAVENOUS at 05:05

## 2018-09-21 RX ADMIN — VITAMIN D, TAB 1000IU (100/BT) 5000 UNITS: 25 TAB at 05:07

## 2018-09-21 RX ADMIN — ACETAMINOPHEN 1000 MG: 500 TABLET, FILM COATED ORAL at 00:37

## 2018-09-21 RX ADMIN — ANTACID TABLETS 1000 MG: 500 TABLET, CHEWABLE ORAL at 18:34

## 2018-09-21 RX ADMIN — THERA TABS 1 TABLET: TAB at 05:06

## 2018-09-21 RX ADMIN — INFLUENZA A VIRUS A/MICHIGAN/45/2015 X-275 (H1N1) ANTIGEN (FORMALDEHYDE INACTIVATED), INFLUENZA A VIRUS A/SINGAPORE/INFIMH-16-0019/2016 IVR-186 (H3N2) ANTIGEN (FORMALDEHYDE INACTIVATED), INFLUENZA B VIRUS B/PHUKET/3073/2013 ANTIGEN (FORMALDEHYDE INACTIVATED), AND INFLUENZA B VIRUS B/MARYLAND/15/2016 BX-69A ANTIGEN (FORMALDEHYDE INACTIVATED) 0.5 ML: 15; 15; 15; 15 INJECTION, SUSPENSION INTRAMUSCULAR at 19:51

## 2018-09-21 RX ADMIN — ACETAMINOPHEN 1000 MG: 500 TABLET, FILM COATED ORAL at 12:34

## 2018-09-21 RX ADMIN — ACETAMINOPHEN 1000 MG: 500 TABLET, FILM COATED ORAL at 18:34

## 2018-09-21 RX ADMIN — DOCUSATE SODIUM 100 MG: 100 CAPSULE, LIQUID FILLED ORAL at 05:06

## 2018-09-21 RX ADMIN — OXYCODONE HYDROCHLORIDE 10 MG: 10 TABLET ORAL at 16:08

## 2018-09-21 RX ADMIN — KETOROLAC TROMETHAMINE 30 MG: 30 INJECTION, SOLUTION INTRAMUSCULAR; INTRAVENOUS at 12:34

## 2018-09-21 RX ADMIN — KETOROLAC TROMETHAMINE 30 MG: 30 INJECTION, SOLUTION INTRAMUSCULAR; INTRAVENOUS at 23:37

## 2018-09-21 RX ADMIN — ONDANSETRON 4 MG: 2 INJECTION INTRAMUSCULAR; INTRAVENOUS at 15:30

## 2018-09-21 RX ADMIN — STANDARDIZED SENNA CONCENTRATE AND DOCUSATE SODIUM 1 TABLET: 8.6; 5 TABLET, FILM COATED ORAL at 19:44

## 2018-09-21 RX ADMIN — POTASSIUM CHLORIDE, DEXTROSE MONOHYDRATE AND SODIUM CHLORIDE: 150; 5; 450 INJECTION, SOLUTION INTRAVENOUS at 23:38

## 2018-09-21 RX ADMIN — KETOROLAC TROMETHAMINE 30 MG: 30 INJECTION, SOLUTION INTRAMUSCULAR; INTRAVENOUS at 18:34

## 2018-09-21 RX ADMIN — KETOROLAC TROMETHAMINE 30 MG: 30 INJECTION, SOLUTION INTRAMUSCULAR; INTRAVENOUS at 00:37

## 2018-09-21 RX ADMIN — ONDANSETRON 4 MG: 2 INJECTION INTRAMUSCULAR; INTRAVENOUS at 19:39

## 2018-09-21 ASSESSMENT — GAIT ASSESSMENTS
ASSISTIVE DEVICE: FRONT WHEEL WALKER
DEVIATION: DECREASED BASE OF SUPPORT;BRADYKINETIC;SHUFFLED GAIT
GAIT LEVEL OF ASSIST: STAND BY ASSIST
DISTANCE (FEET): 150

## 2018-09-21 ASSESSMENT — PAIN SCALES - GENERAL
PAINLEVEL_OUTOF10: 7
PAINLEVEL_OUTOF10: 5
PAINLEVEL_OUTOF10: 6
PAINLEVEL_OUTOF10: 6
PAINLEVEL_OUTOF10: 4

## 2018-09-21 ASSESSMENT — ENCOUNTER SYMPTOMS
CHILLS: 0
NAUSEA: 1
FEVER: 0
DIAPHORESIS: 0
HEADACHES: 0
DIZZINESS: 0
VOMITING: 1
BACK PAIN: 1
BLURRED VISION: 0
FALLS: 0
PALPITATIONS: 0
WEAKNESS: 0
DEPRESSION: 0
DOUBLE VISION: 0

## 2018-09-21 ASSESSMENT — COGNITIVE AND FUNCTIONAL STATUS - GENERAL
HELP NEEDED FOR BATHING: A LITTLE
MOBILITY SCORE: 17
SUGGESTED CMS G CODE MODIFIER DAILY ACTIVITY: CI
SUGGESTED CMS G CODE MODIFIER MOBILITY: CK
STANDING UP FROM CHAIR USING ARMS: A LITTLE
DAILY ACTIVITIY SCORE: 23
MOVING FROM LYING ON BACK TO SITTING ON SIDE OF FLAT BED: UNABLE
MOVING TO AND FROM BED TO CHAIR: UNABLE

## 2018-09-21 ASSESSMENT — ACTIVITIES OF DAILY LIVING (ADL): TOILETING: INDEPENDENT

## 2018-09-21 NOTE — PROGRESS NOTES
Patient ambulated to bathroom and voided. LSO brace on during ambulation. Continuing to encourage intake of fluids. Safety measures in place. All needs met at this time.

## 2018-09-21 NOTE — CARE PLAN
Problem: Safety  Goal: Will remain free from injury  Outcome: PROGRESSING AS EXPECTED  Provided patient education on safety and fall risk. Call light within reach. Personal belongings at bedside. Bed locked in lowest position. Communication signs placed appropriately. Clutter-free environment. Patient verbalized understanding of education and uses call light appropriately.     Problem: Venous Thromboembolism (VTW)/Deep Vein Thrombosis (DVT) Prevention:  Goal: Patient will participate in Venous Thrombosis (VTE)/Deep Vein Thrombosis (DVT)Prevention Measures  Outcome: PROGRESSING AS EXPECTED  Patient ambulated 50 feet with RN and CNA. SCDs in place. Patient verbalized understanding of VTE/DVT prevention.     Problem: Pain Management  Goal: Pain level will decrease to patient's comfort goal  Outcome: PROGRESSING AS EXPECTED  Provided patient education on pain management using pain scale. Morphine PCA in use. Patient verbalized understanding of education and communicates pain level effectively to RN.

## 2018-09-21 NOTE — PROGRESS NOTES
Received report from day shift RNRupal. Reviewed orders, notes, and lab results. Patient is alert and oriented. Vital signs stable on room air. Discussed plan of care, pain management, safety, and fall risk. Patient has had two episodes of emesis; administered PRN Zofran and provided emesis bags. Patient ambulated 50 feet with RN and CNA; steady gait with front-wheel walker. CMS intact; patient denies numbness and tingling. PCA Morphine pump in use for pain. Dressing to lumbar spine is clean, dry, intact. Effective use of incentive spirometer pulling 4000. SCDs in place. Safety and fall precautions in place, call light within reach, hourly rounding.

## 2018-09-21 NOTE — THERAPY
"Physical Therapy Evaluation completed.   Bed Mobility:  Supine to Sit:  (NT, in chair pre)  Transfers: Sit to Stand: Stand by Assist (VC for technique; with FWW)  Gait: Level Of Assist: Stand by Assist with Front-Wheel Walker       Plan of Care: Will benefit from Physical Therapy 5 times per week  Discharge Recommendations: Equipment: No Equipment Needed.     Pt presents with impaired gait mechanics and activity tolerance s/p L3-5 fusion and L2-S1 decompression. Pt is most limited by expected post operative pain, causing bradykinetic movements and use of FWW. Pt is still on PCA this session, will see one more acute PT session to ensure continued current functional mobility as he will have friend support but lives alone; pt demonstrated functional mobility to return home when medically appropriate to do so; would benefit from outpt PT referral when approiate in stage of recovery given chronicity of back pain, age and pt's wish to pursue a job that will require extensive sitting and has a high statistic for back pain ().     See \"Rehab Therapy-Acute\" Patient Summary Report for complete documentation.     "

## 2018-09-21 NOTE — PROGRESS NOTES
Surgical Progress Note    Author: Logan Kim Date & Time created: 2018   2:14 PM     Interval Events:  Pod#1  Review of Systems   Constitutional: Negative for chills, diaphoresis and fever.   Eyes: Negative for blurred vision and double vision.   Cardiovascular: Negative for chest pain and palpitations.   Gastrointestinal: Positive for nausea and vomiting.   Musculoskeletal: Positive for back pain. Negative for falls.   Neurological: Negative for dizziness, weakness and headaches.   Psychiatric/Behavioral: Negative for depression and suicidal ideas.     Hemodynamics:  Temp (24hrs), Av.9 °C (98.5 °F), Min:36.4 °C (97.5 °F), Max:37.5 °C (99.5 °F)  Temperature: 37.5 °C (99.5 °F)  Pulse  Av.1  Min: 68  Max: 115Heart Rate (Monitored): (!) 107  Blood Pressure: (!) 90/65 (RN notified ), NIBP: 108/70     Respiratory:    Respiration: 17, Pulse Oximetry: 96 %           Neuro:  GCS       Fluids:    Intake/Output Summary (Last 24 hours) at 18 1414  Last data filed at 18 0900   Gross per 24 hour   Intake            948.5 ml   Output              150 ml   Net            798.5 ml        Current Diet Order   Procedures   • Diet Order Regular     Physical Exam   Constitutional: He is oriented to person, place, and time. He appears well-developed and well-nourished. No distress.   HENT:   Head: Normocephalic.   Eyes: Conjunctivae are normal.   Neck: Normal range of motion. Neck supple.   Cardiovascular: Normal rate and regular rhythm.    Pulmonary/Chest: Effort normal.   Abdominal: Soft.   Musculoskeletal: Normal range of motion. He exhibits tenderness. He exhibits no edema.   emanuel LE motors,sensory, reflexes intact.   Neurological: He is alert and oriented to person, place, and time.   Skin: He is not diaphoretic.     Labs:  Recent Results (from the past 24 hour(s))   CBC without Differential    Collection Time: 18  3:55 AM   Result Value Ref Range    WBC 12.1 (H) 4.8 - 10.8 K/uL    RBC 3.44 (L)  4.70 - 6.10 M/uL    Hemoglobin 11.5 (L) 14.0 - 18.0 g/dL    Hematocrit 33.5 (L) 42.0 - 52.0 %    MCV 97.4 81.4 - 97.8 fL    MCH 33.4 (H) 27.0 - 33.0 pg    MCHC 34.3 33.7 - 35.3 g/dL    RDW 44.3 35.9 - 50.0 fL    Platelet Count 168 164 - 446 K/uL    MPV 10.5 9.0 - 12.9 fL     Medical Decision Making, by Problem:  There are no active hospital problems to display for this patient.    Plan:  Wean off pca to PO pain meds and use non-opiates as much as possible to keep low does narcotics controlled pain. N/V should get relieved when dc pca. Pt. Is doing good as expected, mobilizing well. Will re-evaluate tomorrow.    Quality Measures:  Quality-Core Measures    Discussed patient condition with RN and orthopedics

## 2018-09-21 NOTE — THERAPY
"Occupational Therapy Evaluation completed.   Functional Status:  Pt s/p L3-5 interbody fusion, and L2-S1 decompression, LSO PRN. Pt was extensively educated on modifications, spinal precautions and provided with post lumbar spine handout for further reinforcement. Pt was able to demonstrate lob rolling from flat hob with supervision, LB dressing, toileting, donned/doffed LSO, and standing oral care w/ supervision, able to demonstrate back training adequately. Pt does not present the need for further acute skilled services at this time and has all the required DME at home.   Plan of Care: Patient with no further skilled OT needs in the acute care setting at this time  Discharge Recommendations:  Equipment: No Equipment Needed. Post-acute therapy Currently anticipate no further skilled therapy needs once patient is discharged from the inpatient setting.    See \"Rehab Therapy-Acute\" Patient Summary Report for complete documentation.    "

## 2018-09-22 LAB
ERYTHROCYTE [DISTWIDTH] IN BLOOD BY AUTOMATED COUNT: 45.4 FL (ref 35.9–50)
HCT VFR BLD AUTO: 32.4 % (ref 42–52)
HGB BLD-MCNC: 11.1 G/DL (ref 14–18)
MCH RBC QN AUTO: 33.8 PG (ref 27–33)
MCHC RBC AUTO-ENTMCNC: 34.3 G/DL (ref 33.7–35.3)
MCV RBC AUTO: 98.8 FL (ref 81.4–97.8)
PLATELET # BLD AUTO: 140 K/UL (ref 164–446)
PMV BLD AUTO: 10.5 FL (ref 9–12.9)
RBC # BLD AUTO: 3.28 M/UL (ref 4.7–6.1)
WBC # BLD AUTO: 9.6 K/UL (ref 4.8–10.8)

## 2018-09-22 PROCEDURE — 700112 HCHG RX REV CODE 229: Performed by: ORTHOPAEDIC SURGERY

## 2018-09-22 PROCEDURE — 36415 COLL VENOUS BLD VENIPUNCTURE: CPT

## 2018-09-22 PROCEDURE — 97116 GAIT TRAINING THERAPY: CPT

## 2018-09-22 PROCEDURE — 700101 HCHG RX REV CODE 250: Performed by: ORTHOPAEDIC SURGERY

## 2018-09-22 PROCEDURE — 97535 SELF CARE MNGMENT TRAINING: CPT

## 2018-09-22 PROCEDURE — A9270 NON-COVERED ITEM OR SERVICE: HCPCS | Performed by: ORTHOPAEDIC SURGERY

## 2018-09-22 PROCEDURE — A9270 NON-COVERED ITEM OR SERVICE: HCPCS | Performed by: PHYSICIAN ASSISTANT

## 2018-09-22 PROCEDURE — 97530 THERAPEUTIC ACTIVITIES: CPT

## 2018-09-22 PROCEDURE — 700111 HCHG RX REV CODE 636 W/ 250 OVERRIDE (IP): Performed by: ORTHOPAEDIC SURGERY

## 2018-09-22 PROCEDURE — 700102 HCHG RX REV CODE 250 W/ 637 OVERRIDE(OP): Performed by: PHYSICIAN ASSISTANT

## 2018-09-22 PROCEDURE — 85027 COMPLETE CBC AUTOMATED: CPT

## 2018-09-22 PROCEDURE — 700102 HCHG RX REV CODE 250 W/ 637 OVERRIDE(OP): Performed by: ORTHOPAEDIC SURGERY

## 2018-09-22 PROCEDURE — 770006 HCHG ROOM/CARE - MED/SURG/GYN SEMI*

## 2018-09-22 RX ADMIN — ONDANSETRON 4 MG: 2 INJECTION INTRAMUSCULAR; INTRAVENOUS at 11:52

## 2018-09-22 RX ADMIN — Medication 1 EACH: at 18:26

## 2018-09-22 RX ADMIN — ACETAMINOPHEN 1000 MG: 500 TABLET, FILM COATED ORAL at 05:05

## 2018-09-22 RX ADMIN — ACETAMINOPHEN 1000 MG: 500 TABLET, FILM COATED ORAL at 11:52

## 2018-09-22 RX ADMIN — OXYCODONE HYDROCHLORIDE 10 MG: 10 TABLET ORAL at 03:05

## 2018-09-22 RX ADMIN — ONDANSETRON 4 MG: 2 INJECTION INTRAMUSCULAR; INTRAVENOUS at 06:17

## 2018-09-22 RX ADMIN — DEXAMETHASONE SODIUM PHOSPHATE 6 MG: 4 INJECTION, SOLUTION INTRAMUSCULAR; INTRAVENOUS at 14:59

## 2018-09-22 RX ADMIN — ANTACID TABLETS 1000 MG: 500 TABLET, CHEWABLE ORAL at 05:06

## 2018-09-22 RX ADMIN — ACETAMINOPHEN 1000 MG: 500 TABLET, FILM COATED ORAL at 18:32

## 2018-09-22 RX ADMIN — OXYCODONE HYDROCHLORIDE 10 MG: 10 TABLET ORAL at 12:00

## 2018-09-22 RX ADMIN — KETOROLAC TROMETHAMINE 30 MG: 30 INJECTION, SOLUTION INTRAMUSCULAR; INTRAVENOUS at 18:32

## 2018-09-22 RX ADMIN — KETOROLAC TROMETHAMINE 30 MG: 30 INJECTION, SOLUTION INTRAMUSCULAR; INTRAVENOUS at 05:06

## 2018-09-22 RX ADMIN — VITAMIN D, TAB 1000IU (100/BT) 5000 UNITS: 25 TAB at 05:05

## 2018-09-22 RX ADMIN — KETOROLAC TROMETHAMINE 30 MG: 30 INJECTION, SOLUTION INTRAMUSCULAR; INTRAVENOUS at 11:52

## 2018-09-22 RX ADMIN — POTASSIUM CHLORIDE, DEXTROSE MONOHYDRATE AND SODIUM CHLORIDE: 150; 5; 450 INJECTION, SOLUTION INTRAVENOUS at 10:52

## 2018-09-22 RX ADMIN — DOCUSATE SODIUM 100 MG: 100 CAPSULE, LIQUID FILLED ORAL at 05:05

## 2018-09-22 RX ADMIN — THERA TABS 1 TABLET: TAB at 05:06

## 2018-09-22 RX ADMIN — OXYCODONE HYDROCHLORIDE 10 MG: 10 TABLET ORAL at 06:17

## 2018-09-22 RX ADMIN — OXYCODONE HYDROCHLORIDE 10 MG: 10 TABLET ORAL at 18:49

## 2018-09-22 RX ADMIN — DOCUSATE SODIUM 100 MG: 100 CAPSULE, LIQUID FILLED ORAL at 18:32

## 2018-09-22 RX ADMIN — KETOROLAC TROMETHAMINE 30 MG: 30 INJECTION, SOLUTION INTRAMUSCULAR; INTRAVENOUS at 23:55

## 2018-09-22 RX ADMIN — ACETAMINOPHEN 1000 MG: 500 TABLET, FILM COATED ORAL at 23:55

## 2018-09-22 RX ADMIN — ANTACID TABLETS 1000 MG: 500 TABLET, CHEWABLE ORAL at 18:32

## 2018-09-22 RX ADMIN — OXYCODONE HYDROCHLORIDE 20 MG: 10 TABLET ORAL at 21:43

## 2018-09-22 RX ADMIN — Medication 1 EACH: at 05:11

## 2018-09-22 RX ADMIN — PROMETHAZINE HYDROCHLORIDE 25 MG: 25 TABLET ORAL at 16:02

## 2018-09-22 RX ADMIN — POTASSIUM CHLORIDE, DEXTROSE MONOHYDRATE AND SODIUM CHLORIDE: 150; 5; 450 INJECTION, SOLUTION INTRAVENOUS at 21:20

## 2018-09-22 ASSESSMENT — PAIN SCALES - GENERAL
PAINLEVEL_OUTOF10: 8
PAINLEVEL_OUTOF10: 6
PAINLEVEL_OUTOF10: 6
PAINLEVEL_OUTOF10: 7
PAINLEVEL_OUTOF10: 8
PAINLEVEL_OUTOF10: 6
PAINLEVEL_OUTOF10: 6

## 2018-09-22 ASSESSMENT — GAIT ASSESSMENTS
DISTANCE (FEET): 350
GAIT LEVEL OF ASSIST: SUPERVISED
ASSISTIVE DEVICE: FRONT WHEEL WALKER

## 2018-09-22 NOTE — CARE PLAN
Problem: Bowel/Gastric:  Goal: Normal bowel function is maintained or improved  Outcome: PROGRESSING AS EXPECTED  Provided patient education on bowel function and administered scheduled stool softeners. Patient experiencing nausea throughout the day, medicated per MAR. Patient verbalized understanding of education.     Problem: Pain Management  Goal: Pain level will decrease to patient's comfort goal  Outcome: PROGRESSING AS EXPECTED  Provided patient education on pain management using pain scale. Morphine PCA discontinued 9/21. Medicated per MAR for pain. Patient verbalized understanding of education and communicates pain level effectively with RN.     Problem: Mobility  Goal: Risk for activity intolerance will decrease  Outcome: PROGRESSING AS EXPECTED  Patient tolerating ambulation well. LSO brace and front-wheel walker in use during ambulation.

## 2018-09-22 NOTE — THERAPY
Physical Therapy Treatment completed.   Bed Mobility:  Supine to Sit: Supervised  Transfers: Sit to Stand: Supervised  Gait: Level Of Assist: Supervised with Front-Wheel Walker       Plan of Care: Patient with no further skilled PT needs in the acute care setting at this time and Patient demonstrates safety with mobility in this environment at this time.   Discharge Recommendations: Equipment: No Equipment Needed.   Pt is cleared for d/c home from PT standpoint. PT will be available for d/c ?'s only.

## 2018-09-22 NOTE — PROGRESS NOTES
Pt ambulated in foster several times, both with staff and with PT with LSO in place. Pain controlled with pca initially, then with oxy 10mg when weaned off pca. Voiding has improved. One episode of emesis, pt states improvement with zofran.

## 2018-09-22 NOTE — PROGRESS NOTES
Patient slept comfortably through the night with minimal pain intervention and is doing well weaning off of the  Morphine PCA; medicated per MAR. Patient had no emesis over night; administered PRN Zofran for nausea. Patient tolerating ambulation with LSO brace. Voiding well. Safety and fall precautions in place, call light within reach, hourly rounding.

## 2018-09-22 NOTE — PROGRESS NOTES
Progress Note               Author: Romain Sanders Date & Time created: 2018  7:02 AM     Interval History:  Doing well    Review of Systems:  ROS    Physical Exam:  Physical Exam   Neurological:   Some drainage on original dressing  S/m/i b le       Labs:        Invalid input(s): [id number]      No results for input(s): SODIUM, POTASSIUM, CHLORIDE, CO2, BUN, CREATININE, MAGNESIUM, PHOSPHORUS, CALCIUM in the last 72 hours.  No results for input(s): ALTSGPT, ASTSGOT, ALKPHOSPHAT, TBILIRUBIN, DBILIRUBIN, GAMMAGT, AMYLASE, LIPASE, ALB, PREALBUMIN, GLUCOSE in the last 72 hours.  Recent Labs      18   0355  18   0448   RBC  3.44*  3.28*   HEMOGLOBIN  11.5*  11.1*   HEMATOCRIT  33.5*  32.4*   PLATELETCT  168  140*     Recent Labs      18   0355  18   0448   WBC  12.1*  9.6     Hemodynamics:  Temp (24hrs), Av.3 °C (99.2 °F), Min:36.9 °C (98.4 °F), Max:38.1 °C (100.6 °F)  Temperature: 36.9 °C (98.4 °F)  Pulse  Av  Min: 68  Max: 115  Blood Pressure: 110/72     Respiratory:    Respiration: 17, Pulse Oximetry: 96 %           Fluids:    Intake/Output Summary (Last 24 hours) at 18 0702  Last data filed at 18 1935   Gross per 24 hour   Intake            542.5 ml   Output                0 ml   Net            542.5 ml        GI/Nutrition:  Orders Placed This Encounter   Procedures   • Diet Order Regular     Standing Status:   Standing     Number of Occurrences:   1     Order Specific Question:   Diet:     Answer:   Regular [1]     Medical Decision Making, by Problem:  There are no active hospital problems to display for this patient.      Plan:  Pca dced  Pt/ot  Doing very well      Quality-Core Measures

## 2018-09-23 VITALS
RESPIRATION RATE: 16 BRPM | SYSTOLIC BLOOD PRESSURE: 103 MMHG | WEIGHT: 168.21 LBS | DIASTOLIC BLOOD PRESSURE: 82 MMHG | HEIGHT: 70 IN | OXYGEN SATURATION: 99 % | HEART RATE: 75 BPM | TEMPERATURE: 99.3 F | BODY MASS INDEX: 24.08 KG/M2

## 2018-09-23 LAB
ERYTHROCYTE [DISTWIDTH] IN BLOOD BY AUTOMATED COUNT: 44.9 FL (ref 35.9–50)
HCT VFR BLD AUTO: 37.3 % (ref 42–52)
HGB BLD-MCNC: 12.4 G/DL (ref 14–18)
MCH RBC QN AUTO: 33.2 PG (ref 27–33)
MCHC RBC AUTO-ENTMCNC: 33.2 G/DL (ref 33.7–35.3)
MCV RBC AUTO: 99.7 FL (ref 81.4–97.8)
PLATELET # BLD AUTO: 179 K/UL (ref 164–446)
PMV BLD AUTO: 10.2 FL (ref 9–12.9)
RBC # BLD AUTO: 3.74 M/UL (ref 4.7–6.1)
WBC # BLD AUTO: 11.7 K/UL (ref 4.8–10.8)

## 2018-09-23 PROCEDURE — 700102 HCHG RX REV CODE 250 W/ 637 OVERRIDE(OP): Performed by: PHYSICIAN ASSISTANT

## 2018-09-23 PROCEDURE — A9270 NON-COVERED ITEM OR SERVICE: HCPCS | Performed by: ORTHOPAEDIC SURGERY

## 2018-09-23 PROCEDURE — A9270 NON-COVERED ITEM OR SERVICE: HCPCS | Performed by: PHYSICIAN ASSISTANT

## 2018-09-23 PROCEDURE — 700112 HCHG RX REV CODE 229: Performed by: ORTHOPAEDIC SURGERY

## 2018-09-23 PROCEDURE — 36415 COLL VENOUS BLD VENIPUNCTURE: CPT

## 2018-09-23 PROCEDURE — 700102 HCHG RX REV CODE 250 W/ 637 OVERRIDE(OP): Performed by: ORTHOPAEDIC SURGERY

## 2018-09-23 PROCEDURE — 85027 COMPLETE CBC AUTOMATED: CPT

## 2018-09-23 PROCEDURE — 700111 HCHG RX REV CODE 636 W/ 250 OVERRIDE (IP): Performed by: ORTHOPAEDIC SURGERY

## 2018-09-23 RX ORDER — DIAZEPAM 5 MG/1
10 TABLET ORAL EVERY 8 HOURS PRN
Qty: 30 TAB | Refills: 1 | Status: SHIPPED | OUTPATIENT
Start: 2018-09-23 | End: 2018-10-07

## 2018-09-23 RX ORDER — OXYCODONE HYDROCHLORIDE 15 MG/1
15 TABLET ORAL
Qty: 56 TAB | Refills: 0 | Status: SHIPPED | OUTPATIENT
Start: 2018-09-23 | End: 2018-09-30

## 2018-09-23 RX ADMIN — Medication: at 12:00

## 2018-09-23 RX ADMIN — ACETAMINOPHEN 1000 MG: 500 TABLET, FILM COATED ORAL at 04:50

## 2018-09-23 RX ADMIN — OXYCODONE HYDROCHLORIDE 20 MG: 10 TABLET ORAL at 10:23

## 2018-09-23 RX ADMIN — Medication 1 EACH: at 05:53

## 2018-09-23 RX ADMIN — ACETAMINOPHEN 1000 MG: 500 TABLET, FILM COATED ORAL at 12:41

## 2018-09-23 RX ADMIN — KETOROLAC TROMETHAMINE 30 MG: 30 INJECTION, SOLUTION INTRAMUSCULAR; INTRAVENOUS at 12:41

## 2018-09-23 RX ADMIN — ANTACID TABLETS 1000 MG: 500 TABLET, CHEWABLE ORAL at 04:50

## 2018-09-23 RX ADMIN — OXYCODONE HYDROCHLORIDE 10 MG: 10 TABLET ORAL at 13:32

## 2018-09-23 RX ADMIN — OXYCODONE HYDROCHLORIDE 20 MG: 10 TABLET ORAL at 04:50

## 2018-09-23 RX ADMIN — KETOROLAC TROMETHAMINE 30 MG: 30 INJECTION, SOLUTION INTRAMUSCULAR; INTRAVENOUS at 04:50

## 2018-09-23 RX ADMIN — VITAMIN D, TAB 1000IU (100/BT) 5000 UNITS: 25 TAB at 08:46

## 2018-09-23 RX ADMIN — OXYCODONE HYDROCHLORIDE 20 MG: 10 TABLET ORAL at 01:29

## 2018-09-23 RX ADMIN — DOCUSATE SODIUM 100 MG: 100 CAPSULE, LIQUID FILLED ORAL at 04:50

## 2018-09-23 RX ADMIN — THERA TABS 1 TABLET: TAB at 09:00

## 2018-09-23 ASSESSMENT — PAIN SCALES - GENERAL
PAINLEVEL_OUTOF10: 5
PAINLEVEL_OUTOF10: 5
PAINLEVEL_OUTOF10: 4
PAINLEVEL_OUTOF10: 4
PAINLEVEL_OUTOF10: 5

## 2018-09-23 NOTE — CARE PLAN
Problem: Venous Thromboembolism (VTW)/Deep Vein Thrombosis (DVT) Prevention:  Goal: Patient will participate in Venous Thrombosis (VTE)/Deep Vein Thrombosis (DVT)Prevention Measures    Intervention: Ensure patient wears graduated elastic stockings ([employer] hose) and/or SCDs, if ordered, when in bed or chair (Remove at least once per shift for skin check)  SCd in place.  Pt tolerating well      Problem: Fluid Volume:  Goal: Will maintain balanced intake and output    Intervention: Monitor, educate, and encourage compliance with therapeutic intake of liquids  Pt on IV fluids.  Pt tolerating IV fluids well.  Encouraging pt to drink PO fluids

## 2018-09-23 NOTE — PROGRESS NOTES
Discharge to home with his friend. Pt signed a copy of the discharge papers and confirms all questions have been answered by the MD or the RN. Second copy of d/c papers in chart. 2 Prescriptions provided to pt. IV discontinued. Pt states all person belongings are in possession. Pt escorted off unit with assistance from the student nurse staff in a wheelchair without incident. Pt room has been stripped and is ready to be cleaned. No belongings left behind. Chart we taken to front RN station and all medications in pt drawer were either discarded or sent to pharmacy.

## 2018-09-23 NOTE — DISCHARGE INSTRUCTIONS
YOB: 1960   Age: 58 y.o.               Admit Date: 9/20/2018     Discharge Date: 9/23/2018  Attending Doctor:  Romain Sanders M.D.                  Allergies:  Patient has no known allergies.  Medical History (as on file):   Past Medical History:   Diagnosis Date   • Back pain     lower back down left leg   • Numbness and tingling of left lower extremity      Past Surgical History:   Procedure Laterality Date   • LUMBAR FUSION POSTERIOR  9/20/2018    Procedure: TRANSFORAMINAL LUMBAR 3 - 5  INTERBODY FUSION;  Surgeon: Romain Sanders M.D.;  Location: SURGERY [facility];  Service: Orthopedics   • LUMBAR DECOMPRESSION  9/20/2018    Procedure: LUMBAR 2- SACRAL 1   DECOMPRESSION;  Surgeon: Romain Sanders M.D.;  Location: SURGERY [facility];  Service: Orthopedics   • OTHER ORTHOPEDIC SURGERY  1974/2000    tendon surgeries left hand       Spinal Surgeries Discharge Instructions    Blood Pressure: 103/82  Weight: 76.3 kg (168 lb 3.4 oz)    Information provided on the following: NO pushing, pulling or lifting greater than 15 lbs , NO repetitive bending and twisting, NO repetitive motion with arms over shoulder level, shower ok, pat incision / steri strips dry, no dressing unless drainage, NO non-steroidal anti-inflammatory meds (for example Motrin, Ibuprofen, Celebrex), call office for incision redness, drainage, chills or increased temperature, smoking cessation advice / information given (see phone numbers below), ambulate as much as it is comfortable for you and NO driving for at least 2 weeks following surgery.    Discharged to home by car with friend. Discharged via wheelchair, hospital escort: Yes.    Special equipment needed: TLSO    Belongings with: Personal    Follow-Up With:    MD sanders     Be sure to schedule a follow-up appointment with your primary care doctor or any specialists as instructed.     Discharge Plan:   Diet Plan: Discussed  Activity Level: Discussed  Influenza Vaccine Given -  only chart on this line when given: Influenza Vaccine Given (See MAR)  Confirmed Follow up Appointment: Patient to Call and Schedule Appointment  Confirmed Symptoms Management: Discussed  Medication Reconciliation Updated: Yes    I understand that a diet low in cholesterol, fat, and sodium is recommended for good health. Unless I have been given specific instructions below for another diet, I accept this instruction as my diet prescription.   Other diet: Regular as tolerated      Spinal Fusion  Spinal fusion is a procedure to make two or more of the bones in your spinal column (vertebrae) grow together (fuse). This procedure stops the vertebrae from moving and rubbing against each other. The goal of this procedure is to relieve pain and prevent deformity and weakening of the spine.  During a spinal fusion procedure, bone material (graft) is put in between the two vertebrae to help them fuse. Hardware such as rods, screws, metal plates, or cages can be inserted to stabilize the vertebrae while they heal.  This procedure is used to treat conditions, including:   · Spinal injury.  · Herniated disk.  · Abnormal curvatures of the spine, such as scoliosis or kyphosis.  · Infections or tumors in the spine.  · Spondylolisthesis. This is when one vertebra slips on top of another.  · Spinal stenosis. This is a narrowing of the spine.  LET YOUR HEALTH CARE PROVIDER KNOW ABOUT:  · Any allergies you have.  · All medicines you are taking, including vitamins, herbs, eye drops, creams, and over-the-counter medicines.  · Previous problems you or members of your family have had with the use of anesthetics.  · Any blood disorders you have.  · Previous surgeries you have had.  · Any medical conditions you have.  · Whether you are pregnant or may be pregnant.  RISKS AND COMPLICATIONS  Generally, this is a safe procedure. However, problems may occur, including:  · Infection.  · Bleeding.  · Allergic reactions to medicines or  dyes.  · Damage to other structures or organs, such as nerves near the spine.  · Spinal fluid leakage.  · Blood clots.  · Difficulty controlling urination or bowel movements.  · Pseudoarthrosis. This is when the vertebrae do not fuse together completely.  BEFORE THE PROCEDURE  · Ask your health care provider about:  ¨ Changing or stopping your regular medicines. This is especially important if you are taking diabetes medicines or blood thinners.  ¨ Taking medicines such as aspirin and ibuprofen. These medicines can thin your blood. Do not take these medicines before your procedure if your health care provider instructs you not to.  · Ask your health care provider how your surgical site will be marked or identified.  · You may be given antibiotic medicine to help prevent infection.  · Your health care provider will do a physical exam.  · You will have blood and urine samples taken.  · You may also have tests, such as:  ¨ X-rays.  ¨ CT scan.  ¨ MRI.  · Follow instructions from your health care provider about eating or drinking restrictions.  · Plan to have someone take you home after the procedure.  · If you go home right after the procedure, plan to have someone with you for 24 hours.  PROCEDURE  · To reduce your risk of infection:  ¨ Your health care team will wash or sanitize their hands.  ¨ Your skin will be washed with soap.  · An IV tube will be inserted into one of your veins.  · You will be given one or more of the following:  ¨ A medicine to help you relax (sedative).  ¨ A medicine to make you fall asleep (general anesthetic).  · If bone from another part of your body (autogenous bone) is being used to fill the space between your vertebrae:  ¨ Often, the bone is taken from the hip (pelvic) bone.  ¨ An incision will be made over the site of the bone graft.  ¨ A small part of the bone will be removed.  · An incision will be made over the vertebrae that will be fused. This incision may be on your back, abdomen,  "or side.  · The back muscles will be moved aside so the surgeon can see the vertebrae.  · If you are having this procedure to treat a herniated disk, part of the disk will be removed.  · The space between the vertebrae will be filled with autogenous bone, bone from a bone donor (allograft bone), or artificial bone material.  · The back muscles will be moved back into place.  · Screws and rods or metal plates may be used to stabilize the vertebrae while they fuse.  · Your incision(s) may be closed.  · A bandage (dressing) may be used to cover your incision(s).  The procedure may vary among health care providers and hospitals.   AFTER THE PROCEDURE  · You will be given medicine as needed for pain.  · You will continue to receive fluids and medicines through an IV tube.  · Your blood pressure, heart rate, breathing rate, and blood oxygen level will be monitored often until the medicines you were given have worn off.    · Do not drive for 24 hours if you received a sedative.  · You may have to wear compression stockings. These stockings help to prevent blood clots and reduce swelling in your legs.  · You will be taught how to move correctly and how to stand and walk. While in bed, you will be instructed to turn frequently using a \"log rolling\" technique, in which the entire body is moved without twisting the back.  This information is not intended to replace advice given to you by your health care provider. Make sure you discuss any questions you have with your health care provider.  Document Released: 09/15/2004 Document Revised: 04/10/2017 Document Reviewed: 06/01/2016  [employer] Interactive Patient Education © 2017 [employer]      Spinal Fusion, Care After  These instructions give you information about caring for yourself after your procedure. Your doctor may also give you more specific instructions. Call your doctor if you have any problems or questions after your procedure.  Follow these instructions at " home:  Medicines  · Take over-the-counter and prescription medicines only as told by your doctor. These include any medicines for pain.  · Do not drive for 24 hours if you received a sedative.  · Do not drive or use heavy machinery while taking prescription pain medicine.  · If you were prescribed an antibiotic medicine, take it as told by your doctor. Do not stop taking the antibiotic even if you start to feel better.  Surgical Cut (Incision) Care  · Follow instructions from your doctor about how to take care of your surgical cut. Make sure you:  ¨ Wash your hands with soap and water before you change your bandage (dressing). If you cannot use soap and water, use hand .  ¨ Change your bandage as told by your doctor.  ¨ Leave stitches (sutures), skin glue, or skin tape (adhesive) strips in place. They may need to stay in place for 2 weeks or longer. If tape strips get loose and curl up, you may trim the loose edges. Do not remove tape strips completely unless your doctor says it is okay.  · Keep your surgical cut clean and dry. Do not take baths, swim, or use a hot tub until your doctor says it is okay.  · Check your surgical cut and the area around it every day for:  ¨ Redness.  ¨ Swelling.  ¨ Fluid.  Physical Activity  · Return to your normal activities as told by your doctor. Ask your doctor what activities are safe for you. Rest and protect your back as much as you can.  · Follow instructions from your doctor about how to move. Use good posture to help your spine heal.  · Do not lift anything that is heavier than 8 lb (3.6 kg) or as told by your doctor until he or she says that it is safe. Do not lift anything over your head.  · Do not twist or bend at the waist until your doctor says it is okay.  · Avoid pushing or pulling motions.  · Do not sit or lie down in the same position for long periods of time.  · Do not start to exercise until your doctor says it is okay. Ask your doctor what kinds of  exercise you can do to make your back stronger.  General instructions  · If you were given a brace, use it as told by your doctor.  · Wear compression stockings as told by your doctor.  · Do not use tobacco products. These include cigarettes, chewing tobacco, or e-cigarettes. If you need help quitting, ask your doctor.  · Keep all follow-up visits as told by your doctor. This is important. This includes any visits with your physical therapist, if this applies.  Contact a doctor if:  · Your pain gets worse.  · Your medicine does not help your pain.  · Your legs or feet become painful or swollen.  · Your surgical cut is red, swollen, or painful.  · You have fluid, blood, or pus coming from your surgical cut.  · You feel sick to your stomach (nauseous).  · You throw up (vomit).  · Your have weakness or loss of feeling (numbness) in your legs that is new or getting worse.  · You have a fever.  · You have trouble controlling when you pee (urinate) or poop (have a bowel movement).  Get help right away if:  · Your pain is very bad.  · You have chest pain.  · You have trouble breathing.  · You start to have a cough.  These symptoms may be an emergency. Do not wait to see if the symptoms will go away. Get medical help right away. Call your local emergency services (911 in the [place] Do not drive yourself to the hospital.   This information is not intended to replace advice given to you by your health care provider. Make sure you discuss any questions you have with your health care provider.  Document Released: 04/12/2012 Document Revised: 08/15/2017 Document Reviewed: 06/01/2016  [employer] Interactive Patient Education © 2017 [employer]    Infection Control in the Home  If you have an infection or you are taking care of someone who has an infection, it is important to know how to keep the infection from spreading. Follow these guidelines to help stop the spread of infection, and talk to your health care provider.  HOW ARE  INFECTIONS SPREAD?  In order for an infection to spread, the following must be present:  · A germ. This may be a virus, bacteria, fungus, or parasite.  · A place for the germ to live. This may be:  ¨ On or in a person, animal, plant, or food.  ¨ In soil or water.  ¨ On surfaces, such as a door handle.  · A susceptible host. This is a person or animal who does not have resistance (immunity) to the germ.  · A way for the germ to enter the host. This may occur by:  ¨ Direct contact. This may happen by making contact--such as shaking hands or hugging--with an infected person or animal. Some germs can also travel through the air and spread to you if an infected person coughs or sneezes on you or near you.  ¨ Indirect contact. This is when the germ enters the host through contact with an infected object. Examples include eating contaminated food, drinking contaminated water, or touching a contaminated surface with your hands and then touching your face, nose, or mouth soon after that.  HOW CAN I HELP TO PREVENT INFECTION FROM SPREADING?  There are several things that you can do to help prevent infection from spreading.  Hand Washing  It is very important to wash your hands correctly, following these steps:  1. Wet your hands with clean, running water.  2. Apply soap to your hands. Liquid soap is better than bar soap.  3. Rub your hands together quickly to create lather.  4. Keep rubbing your hands together for at least 20 seconds. Thoroughly scrub all parts of your hands, including under your fingernails and between your fingers.  5. Rinse your hands with clean, running water until all of the soap is gone.  6. Dry your hands with an air dryer or a clean paper or cloth towel, or let your hands air-dry. Do not use your clothing or a soiled towel to dry your hands.  7. If you are in a public restroom, use your towel to turn off the water faucet and to open the bathroom door.  Make sure to wash your  hands:  · Before:  ¨ Visiting a baby or anyone with a weakened or lowered defense (immune) system.  ¨ Putting in and taking out any contact lenses.  · After:  ¨ Working or playing outside.  ¨ Touching an animal or its toys or leash.  ¨ Handling livestock.  ¨ Using the bathroom or helping a child or adult to use the bathroom.  ¨ Using household  or toxic chemicals.  ¨ Touching or taking out the garbage.  ¨ Touching anything dirty around your home.  ¨ Handling soiled clothes or rags.  ¨ Taking care of a sick child. This includes touching used tissues, toys, and clothes.  ¨ Sneezing, coughing, or blowing your nose.  ¨ Using public transportation.  ¨ Shaking hands.  ¨ Using a phone, including your mobile phone.  ¨ Touching money.  · Before and after:  ¨ Preparing food.  ¨ Preparing a bottle for a baby.  ¨ Feeding a baby or a young child.  ¨ Eating.  ¨ Visiting or taking care of someone who is sick.  ¨ Changing a diaper.  ¨ Changing a bandage (dressing) or taking care of an injury or wound.  ¨ Giving or taking medicine.  Taking Care of Your Home  · Make sure that you have enough cleaning supplies at all times. These include:  ¨ Disinfectants.  ¨ Reusable cleaning cloths. Wash these after each use.  ¨ Paper towels.  ¨ Utility gloves. Replace your gloves if they are cracked or torn or if they start to peel.  · Use bleach safely. Never mix it with other cleaning products, especially those that contain ammonia. This mixture can create a dangerous gas that may be deadly.  · Take care of your cleaning supplies. Toilet brushes, mops, and sponges can breed germs. Soak them in bleach and water for 5 minutes after each use.  · Do not pour used mop water down the sink. Pour it down the toilet instead.  · Maintain proper ventilation in your home.  · If you have a pet, ensure that your pet stays clean. Do not let people with weak immune systems touch bird droppings, fish tank water, or a litter box.  ¨ If you have a cat, be  sure to change the litter every day.  · In the bathroom, make sure you:  ¨ Provide liquid soap.  ¨ Change towels and washcloths frequently. Avoid sharing towels and washcloths.  ¨ Change toothbrushes often and store them separately in a clean, dry place.  ¨ Disinfect the toilet.  ¨ Clean the tub, shower, and sink with standard cleaning products.    ¨ Mop the floor with a standard .  ¨ Do not share personal items, such as razors, toothbrushes, drinking glasses, deodorant, johnston, brushes, towels, and washcloths.    · In the kitchen, make sure you:  ¨ Store food carefully.  ¨ Refrigerate leftovers promptly in covered containers.  ¨ Throw out stale or spoiled food.  ¨ Clean the inside of your refrigerator each week.  ¨ Keep your refrigerator set at 40°F (4°C) or less, and set your freezer at 0°F (-18°C) or less.  ¨ Thaw foods in the refrigerator or microwave, not at room temperature.  ¨ Serve foods at the proper temperature. Do not eat raw meat. Make sure it is cooked to the appropriate temperature. Cook eggs until they are firm.  ¨ Wash fruits and vegetables under running water.  ¨ Use separate cutting boards, plates, and utensils for raw foods and cooked foods.  ¨ Keep work surfaces clean.  ¨ Use a clean spoon each time you sample food while cooking.  ¨ Wash your dishes in hot, soapy water. Air-dry your dishes or use a .  ¨ Do not share forks, cups, or spoons during meals.  · Wear gloves if laundry is visibly soiled.  · Change linens each week or whenever they are soiled.  · Do not shake soiled linens. Doing that may send germs into the air. Put dressings, sanitary or incontinence pads, diapers, and gloves in plastic garbage bags for disposal.     This information is not intended to replace advice given to you by your health care provider. Make sure you discuss any questions you have with your health care provider.     Document Released: 09/26/2009 Document Revised: 01/08/2016 Document Reviewed:  08/20/2015  [employer] Interactive Patient Education ©2016 [employer]      How can pain medicine affect me?  You were prescribed pain medicine. This medicine may:  · Make you tired or sleepy.  · Make you feel dizzy.  · Affect how well you can:  ¨ Drive  ¨ Do certain activities.  Pain medicine may not make all of your pain go away. You should be comfortable enough to:  · Move.  · Breathe.  · Take care of yourself.  How often should I take pain medicine and how much should I take?  · Take pain medicine only as told by your doctor and only as needed for pain.  · You do not need to take pain medicine if you are not having pain, unless your doctor tells you to do that.  · You can take less than the prescribed dose if you find that less medicine helps your pain.  · If you have very bad (severe) pain, call your doctor. Do not take more pills than told by your doctor. Do not take pills more often than told by your doctor.  What should I avoid while I am taking pain medicine?  Follow these instructions after you start taking pain medicine, while you are taking the medicine, and for 8 hours after you stop taking the medicine:  · Do not drive.  · Do not use machinery.  · Do not use power tools.  · Do not sign legal documents.  · Do not drink alcohol.  · Do not take sleeping pills.  · Do not take care of children by yourself.  · Do not do any activities that involve climbing or being in high places.  · Do not go into any body of water unless there is an adult nearby who can watch and help you. This includes:  ¨ Lakes.  ¨ Rivers.  ¨ Oceans.  ¨ Spas.  ¨ Swimming pools.  How can I keep others safe while I am taking pain medicine?  · Store your pain medicine as told by your doctor. Make sure that you keep it where children and pets cannot reach it.  · Do not share your pain medicine with anyone.  · Do not save any leftover pills. If you have any leftover pain medicine, get rid of it or destroy it as told by your doctor.  What else  do I need to know about taking pain medicine?  · Use a poop (stool) softener if you have trouble pooping (constipation) because of your pain medicine. Eating more fruits and vegetables also helps with constipation.  · Write down the times when you take your pain medicine. Look at the times before you take your next dose of medicine.  · Your pain medicine might have acetaminophen in it. Do not take any other acetaminophen while you are taking this medicine. An overdose of acetaminophen can do very bad damage to your liver. If you are taking any medicines in addition to your pain medicine, check the active ingredients on those medicines to see if acetaminophen is listed.  When should I call my doctor?  · Your medicine is not helping the pain.  · You do either of these soon after you take the medicine:  ¨ Throw up (vomit).  ¨ Have watery poop (diarrhea).  · You have new pain in areas that did not hurt before.  · You have an allergic reaction to your medicine. This may include:  ¨ Feeling itchy.  ¨ Swelling.  ¨ Feeling dizzy.  ¨ Getting a new rash.  · You cannot put up with feeling:  ¨ Dizzy.  ¨ Sick to your stomach (nauseous).  When should I call 911 or go to the emergency room?  · You pass out (faint).  · You feel very confused.  · You throw up again and again.  · Your skin or lips turn pale or bluish in color.  · You are:  ¨ Short of breath.  ¨ Breathing much more slowly than usual.  · You have a very bad allergic reaction to your medicine. This includes:  ¨ Developing a swollen tongue.  ¨ Having trouble breathing.  This information is not intended to replace advice given to you by your health care provider. Make sure you discuss any questions you have with your health care provider.  Document Released: 06/05/2009 Document Revised: 08/24/2017 Document Reviewed: 10/22/2015  © 2017 [employer]      How to walk with a walker  The best way to walk with a walker depends on whether you are using a standard walker or a  "front-wheeled walker. A standard walker has rubber tips on the ends of all four legs. A front-wheeled walker has wheels on the ends of the front legs and rubber tips on the ends of the back legs.  · Do not use your walker on stairs or an escalator unless you have been trained by a physical therapist or unless your health care provider approves.  To Walk With a Standard Walker:   8.  your walker. Do not slide your standard walker.  9. Set down your walker, one step-length in front of you. Make sure that all four legs of the walker touch the ground at the same time. Your toes should be farther forward than the back legs of your walker.  10. Hold on to the walker for support, and step your weaker leg into the middle of the walker.  11. Step your stronger leg forward to land next to your weaker leg.  12. Repeat this process for each step.  To Walk With a Front-Wheeled Walker:  1. Slide your front-wheeled walker one step-length in front of you. Your toes should be farther forward than the back legs of your walker.  2. Hold on to the walker for support, and step your weaker leg into the middle of the walker.  3. Step your stronger leg forward to land next to your weaker leg.  4. Repeat the process for each step.  Tips  · Always keep both feet within the width of the walker's legs or wheels.  · When using your walker, you should not feel like you need to lean forward or to the side to keep your hands on the handgrips.  · Make sure you are following any weight-bearing instructions that your health care provider has given you.  · If you have a standard walker:  ¨ Do not slide your walker when you are moving.  · If you have a front-wheeled walker:  ¨ Be careful not to let the walker get too far ahead of you as you walk.  ¨ If your walker does not glide well over carpet, consider cutting an \"X\" into two tennis balls and placing the balls over the back legs of your walker.  How to stand up with a walker  1. Put your " walker in front of you.  2. Slide forward in your chair.  3. Position your legs so that your weaker leg is ahead of you and your stronger leg is bent and near your chair.  4. Position your hands.  ¨ If your chair has armrests, put each hand on an armrest.  ¨ If there are no armrests, put the hand opposite your weaker leg on the chair seat, and put the other hand on the center of the walker's crossbar.  5. Lean forward and push up from your chair.  6. Rise by straightening your stronger leg.  7. Steady yourself.  8. Carefully move your hands to the handgrips of the walker.  Tips  · Do not pull on the walker when you stand up. This may cause it to tip.  · Sit in a firm chair whenever you can. A low seat or an overstuffed chair or sofa is hard to get out of.  How to sit down with a walker  To Sit Down in a Seat That Has Armrests:   1. Back up toward your seat, using your walker, until you feel the back of your legs touch the chair.  2. Carefully reach your hands behind you and put each hand on an armrest.  3. Slowly lower yourself into the seat.  To Sit Down in a Seat Without Armrests:  1. Back up toward the side of the seat, using your walker, until you feel the back of your legs touch the chair.  2. Use one hand to hold on to the back of the chair, and use the other hand to hold on to the front of the seat.  3. Slowly lower yourself into the seat.  How to use a walker on a curb or step  To Use a Walker to Step Up:  1. Put all four legs of the walker on the curb or step.  2. Get your feet as close to the curb or step as you can.  3. Test the steadiness of the walker by pressing down on the handgrips.  4. If the walker is steady, press down on it with your hands as you step up with your stronger leg.  5. Step up with your weaker leg.  To Use a Walker to Step Down   :  1. Put all four legs of the walker on the surface that is lower than the curb or step.  2. Get your feet as close to the curb or step as you  can.  3. Test the steadiness of the walker by pressing down on the handgrips.  4. If the walker is steady, press down on it with your hands as you step down with your weaker leg.  5. Step down with your stronger leg.  This information is not intended to replace advice given to you by your health care provider. Make sure you discuss any questions you have with your health care provider.  Document Released: 12/18/2006 Document Revised: 05/17/2017 Document Reviewed: 07/01/2016  Elsevier Interactive Patient Education © 2017 [employer]      Discharge Medication Instructions:    Below are the medications your physician expects you to take upon discharge:    Review all your home medications and newly ordered medications with your doctor and/or pharmacist. Follow medication instructions as directed by your doctor and/or pharmacist.

## 2018-09-23 NOTE — PROGRESS NOTES
Pt is AAOx4  Pt reports a 6/10 pain level  Pt educated on when next pain pill is due  VS WNL  Old drainage on dressing  POC discussed  All needs met at this time  Bed in low position  Call light within reach  Rounding in place

## 2018-09-23 NOTE — CARE PLAN
Problem: Safety  Goal: Will remain free from injury  Outcome: PROGRESSING AS EXPECTED  No falls this shift. Pt uses call bell appropriately, SBA for ambulation.    Problem: Venous Thromboembolism (VTW)/Deep Vein Thrombosis (DVT) Prevention:  Goal: Patient will participate in Venous Thrombosis (VTE)/Deep Vein Thrombosis (DVT)Prevention Measures  Outcome: PROGRESSING AS EXPECTED  Pt wears SCDs when in bed.    Problem: Pain Management  Goal: Pain level will decrease to patient's comfort goal  Outcome: PROGRESSING AS EXPECTED  Pt's pain controlled with oxycodone, toradol, Tylenol, decadron.

## 2018-09-23 NOTE — PROGRESS NOTES
Received bedside shift report from night RN. Pt AOx4. Pt reports pain is 4/10 goal is 4/10 ambulating is working for pt pain currently. Does call appropriately. Bed alarm is off. Pt is walking with the student RN. Pt has phone with them in the room, all personal items are label with pt sticker. PIV assessed and is patent. Pt is on RA. POC discussed as well as unit routine, comfort, and safety. Dicussed DC planning to home today. Discussed the goal for today: mobility and dc education. Reviewed orders, notes, labs, and test results. Hourly rounding in place with RN rounding on odd hours and CNA on even hours. 12 hour chart check completed.

## 2018-09-24 NOTE — DISCHARGE SUMMARY
DATE OF ADMISSION:  09/20/2018    DATE OF DISCHARGE:  09/23/2018    DISCHARGE DIAGNOSES:  Status post L3-L5 transforaminal lumbar interbody   instrumented fusion and L2-S1 decompressive laminectomy with foraminotomies   for spondylolisthesis, lumbar spinal stenosis, lumbar radiculopathy, lumbar   degenerative disk disease.    DISCHARGE MEDICATIONS:  Include oxycodone 15 one p.o. q.3 hours p.r.n. pain   and Valium 10 mg 1 p.o. t.i.d. p.r.n. muscle spasm and Tylenol p.r.n. mild   pain.    DISCHARGE FOLLOWUP:  He will follow up with me, Dr. Romain Sanders, [phone] in   approximately 2 weeks.    HISTORY OF PRESENT ILLNESS AND HOSPITAL COURSE:  The patient is a 58-year-old   gentleman with a chief complaint of back pain with radiation of symptoms to   bilateral lower extremities.  He failed conservative treatment and because of   this, he was scheduled for surgery, which took place on the day of admission.    The surgery went well without complication and postoperatively, he has had no   complications either.  He has had no operative or postoperative   complications.  He has passed physical therapy and his pain is controlled well   on oral pain medication.  He has also passed all nursing criteria to be able   to be discharged home.  We will also give him some dressing supplies.  He has   had an uneventful hospitalization.  At this point, we will see him back in 2   weeks.       ____________________________________     MD FANNY HUSTON / [facility]ST    DD:  09/23/2018 14:33:31  DT:  09/23/2018 22:48:45    D#:  [id number]  Job#:  [id number]

## 2018-09-25 ENCOUNTER — TELEPHONE (OUTPATIENT)
Dept: MEDICAL GROUP | Facility: CLINIC | Age: 58
End: 2018-09-25

## 2018-09-25 NOTE — TELEPHONE ENCOUNTER
Phone Number Called: [phone] (home)     Message: Cintia (family friend) came in and said patients wound for surgery is leaking and patient is unable to hold down food/water and keeps throwing up. Stated that is he is still having issues to contact surgeon or go back to ER. They would like your input.  I did schedule a FV with you tomorrow at 11AM.    Cintia- [phone] NOT IN CHART FOR INFO TO BE GIVEN TO- JUST TOOK INFORMATION TO INFORM PROVIDER.    Left Message for patient to call back: N\A

## 2018-09-25 NOTE — TELEPHONE ENCOUNTER
Gregorio Chamberlain M.D.   You 33 minutes ago (2:23 PM)      Needs to go to [facility] (Routing comment)

## 2018-09-25 NOTE — TELEPHONE ENCOUNTER
Phone Number Called: [phone] (home)     Message: Called and spoke with patient and informed him to go back to ER. He said alright. Kept his Apt. For FV incase he needs it.     Left Message for patient to call back: N\A

## 2018-09-26 ENCOUNTER — OFFICE VISIT (OUTPATIENT)
Dept: MEDICAL GROUP | Facility: CLINIC | Age: 58
End: 2018-09-26
Payer: MEDICAID

## 2018-09-26 VITALS
WEIGHT: 168 LBS | TEMPERATURE: 98.7 F | OXYGEN SATURATION: 97 % | SYSTOLIC BLOOD PRESSURE: 124 MMHG | BODY MASS INDEX: 22.75 KG/M2 | HEART RATE: 117 BPM | RESPIRATION RATE: 18 BRPM | HEIGHT: 72 IN | DIASTOLIC BLOOD PRESSURE: 78 MMHG

## 2018-09-26 DIAGNOSIS — T14.8XXA DISCHARGE FROM WOUND: ICD-10-CM

## 2018-09-26 PROBLEM — R03.0 ELEVATED BLOOD PRESSURE READING: Status: RESOLVED | Noted: 2018-07-25 | Resolved: 2018-09-26

## 2018-09-26 PROCEDURE — 99213 OFFICE O/P EST LOW 20 MIN: CPT | Performed by: FAMILY MEDICINE

## 2018-09-26 NOTE — PROGRESS NOTES
Complaint: Wound check.     Subjective:     Tomás Urrutia is a 58 y.o. male here today for postsurgical wound check.    Discharge from wound  Underwent lumbar spine surgery last Thursday. Would has been seeping lots of bloody liquid. Changing dressing frequently.Had bout of chills, but no fever according to patient. Pain under good control. Has appointment with surgeon next week.     No other concerns or complaints today.    Current medicines (including changes today)  Current Outpatient Prescriptions   Medication Sig Dispense Refill   • oxycodone (OXY-IR) 15 MG immediate release tablet Take 1 Tab by mouth every 3 hours as needed for Severe Pain for up to 7 days. 56 Tab 0   • diazePAM (VALIUM) 5 MG Tab Take 2 Tabs by mouth every 8 hours as needed for Anxiety (muscle spasm) for up to 14 days. 30 Tab 1   • acetaminophen (TYLENOL) 500 MG Tab Take 500 mg by mouth every 6 hours as needed for Moderate Pain.     • Multiple Vitamin (MULTI-VITAMIN DAILY PO) Take 1 Tab by mouth 3 times a day.       No current facility-administered medications for this visit.      He  has a past medical history of Back pain and Numbness and tingling of left lower extremity.    Health Maintenance: not addressed today      Allergies: Patient has no known allergies.    Current Outpatient Prescriptions Ordered in [facility]   Medication Sig Dispense Refill   • oxycodone (OXY-IR) 15 MG immediate release tablet Take 1 Tab by mouth every 3 hours as needed for Severe Pain for up to 7 days. 56 Tab 0   • diazePAM (VALIUM) 5 MG Tab Take 2 Tabs by mouth every 8 hours as needed for Anxiety (muscle spasm) for up to 14 days. 30 Tab 1   • acetaminophen (TYLENOL) 500 MG Tab Take 500 mg by mouth every 6 hours as needed for Moderate Pain.     • Multiple Vitamin (MULTI-VITAMIN DAILY PO) Take 1 Tab by mouth 3 times a day.       No current [facility]-ordered facility-administered medications on file.        Past Medical History:   Diagnosis Date   • Back pain     lower  "back down left leg   • Numbness and tingling of left lower extremity        Past Surgical History:   Procedure Laterality Date   • LUMBAR FUSION POSTERIOR  9/20/2018    Procedure: TRANSFORAMINAL LUMBAR 3 - 5  INTERBODY FUSION;  Surgeon: Romain Sanders M.D.;  Location: [facility];  Service: Orthopedics   • LUMBAR DECOMPRESSION  9/20/2018    Procedure: LUMBAR 2- SACRAL 1   DECOMPRESSION;  Surgeon: Romain Sanders M.D.;  Location: SURGERY [facility];  Service: Orthopedics   • OTHER ORTHOPEDIC SURGERY  1974/2000    tendon surgeries left hand       Social History   Substance Use Topics   • Smoking status: Never Smoker   • Smokeless tobacco: Never Used   • Alcohol use Yes      Comment: 3 per week       Social History     Social History Narrative   • No narrative on file       Family History   Problem Relation Age of Onset   • Stroke Father    • Stroke Paternal Grandmother          ROS Positive for drainage for wound in back only  Patient denies any fever, chills, unintentional weight gain/loss, fatigue, stroke symptoms, dizziness, headache, nasal congestion, sore-throat, cough, heartburn, chest pain, difficulty breathing, abdominal discomfort, diarrhea/constipation, burning with urination or frequency, joint or back pain, skin rashes, depression or anxiety.       Objective:     Blood pressure 124/78, pulse (!) 117, temperature 37.1 °C (98.7 °F), temperature source Temporal, resp. rate 18, height 1.816 m (5' 11.5\"), weight 76.2 kg (168 lb), SpO2 97 %. Body mass index is 23.1 kg/m².   Physical Exam:  Constitutional: Alert, no distress.  Back: approximately 20 cm long midline surgical scar lumbar area. Wound calm. Upper end pockets of fluctuation. Serosanguinous discharge lower pole upon pressure. Skin otherwise without sign infection. No tenderness upon palpation.  Psych: Alert and oriented x3, appropriate affect and mood.        Assessment and Plan:   The following treatment plan was discussed    1. " Discharge from wound  New problem. Dressing changed. Change twice daily. Reviewed signs of when to contact MD.      Followup: Return if symptoms worsen or fail to improve.    Please note that this dictation was created using voice recognition software. I have made every reasonable attempt to correct obvious errors, but I expect that there are errors of grammar and possibly content that I did not discover before finalizing the note.

## 2018-09-26 NOTE — ASSESSMENT & PLAN NOTE
Underwent lumbar spine surgery last Thursday. Would has been seeping lots of bloody liquid. Changing dressing frequently.Had bout of chills, but no fever according to patient. Pain under good control. Has appointment with surgeon next week.

## 2018-10-10 ENCOUNTER — HOSPITAL ENCOUNTER (INPATIENT)
Facility: MEDICAL CENTER | Age: 58
LOS: 4 days | DRG: 515 | End: 2018-10-14
Attending: EMERGENCY MEDICINE | Admitting: INTERNAL MEDICINE
Payer: MEDICAID

## 2018-10-10 ENCOUNTER — APPOINTMENT (OUTPATIENT)
Dept: RADIOLOGY | Facility: MEDICAL CENTER | Age: 58
DRG: 515 | End: 2018-10-10
Attending: EMERGENCY MEDICINE
Payer: MEDICAID

## 2018-10-10 DIAGNOSIS — A41.9 SEPSIS, DUE TO UNSPECIFIED ORGANISM: ICD-10-CM

## 2018-10-10 DIAGNOSIS — G89.18 POST-OP PAIN: ICD-10-CM

## 2018-10-10 DIAGNOSIS — T81.49XA WOUND INFECTION AFTER SURGERY: ICD-10-CM

## 2018-10-10 DIAGNOSIS — T81.49XA SURGICAL SITE INFECTION: ICD-10-CM

## 2018-10-10 PROBLEM — D64.9 ANEMIA: Status: ACTIVE | Noted: 2018-10-10

## 2018-10-10 PROBLEM — E87.1 HYPONATREMIA: Status: ACTIVE | Noted: 2018-10-10

## 2018-10-10 PROBLEM — R68.89 RIGORS: Status: ACTIVE | Noted: 2018-10-10

## 2018-10-10 LAB
ALBUMIN SERPL BCP-MCNC: 3.1 G/DL (ref 3.2–4.9)
ALBUMIN/GLOB SERPL: 1.1 G/DL
ALP SERPL-CCNC: 41 U/L (ref 30–99)
ALT SERPL-CCNC: 18 U/L (ref 2–50)
ANION GAP SERPL CALC-SCNC: 12 MMOL/L (ref 0–11.9)
APPEARANCE UR: CLEAR
APTT PPP: 35.9 SEC (ref 24.7–36)
AST SERPL-CCNC: 24 U/L (ref 12–45)
BASOPHILS # BLD AUTO: 0.2 % (ref 0–1.8)
BASOPHILS # BLD: 0.02 K/UL (ref 0–0.12)
BILIRUB SERPL-MCNC: 0.6 MG/DL (ref 0.1–1.5)
BILIRUB UR QL STRIP.AUTO: NEGATIVE
BUN SERPL-MCNC: 13 MG/DL (ref 8–22)
CALCIUM SERPL-MCNC: 7.5 MG/DL (ref 8.5–10.5)
CHLORIDE SERPL-SCNC: 98 MMOL/L (ref 96–112)
CO2 SERPL-SCNC: 21 MMOL/L (ref 20–33)
COLOR UR: YELLOW
CREAT SERPL-MCNC: 0.83 MG/DL (ref 0.5–1.4)
EKG IMPRESSION: NORMAL
EOSINOPHIL # BLD AUTO: 0 K/UL (ref 0–0.51)
EOSINOPHIL NFR BLD: 0 % (ref 0–6.9)
ERYTHROCYTE [DISTWIDTH] IN BLOOD BY AUTOMATED COUNT: 42.5 FL (ref 35.9–50)
FERRITIN SERPL-MCNC: 756.7 NG/ML (ref 22–322)
FOLATE SERPL-MCNC: 10.4 NG/ML
GLOBULIN SER CALC-MCNC: 2.7 G/DL (ref 1.9–3.5)
GLUCOSE SERPL-MCNC: 104 MG/DL (ref 65–99)
GLUCOSE UR STRIP.AUTO-MCNC: NEGATIVE MG/DL
GRAM STN SPEC: NORMAL
GRAM STN SPEC: NORMAL
HCT VFR BLD AUTO: 29.3 % (ref 42–52)
HGB BLD-MCNC: 10.4 G/DL (ref 14–18)
IMM GRANULOCYTES # BLD AUTO: 0.04 K/UL (ref 0–0.11)
IMM GRANULOCYTES NFR BLD AUTO: 0.5 % (ref 0–0.9)
INR PPP: 1.44 (ref 0.87–1.13)
KETONES UR STRIP.AUTO-MCNC: 40 MG/DL
LACTATE BLD-SCNC: 1.8 MMOL/L (ref 0.5–2)
LEUKOCYTE ESTERASE UR QL STRIP.AUTO: NEGATIVE
LYMPHOCYTES # BLD AUTO: 0.65 K/UL (ref 1–4.8)
LYMPHOCYTES NFR BLD: 7.5 % (ref 22–41)
MCH RBC QN AUTO: 33.2 PG (ref 27–33)
MCHC RBC AUTO-ENTMCNC: 35.5 G/DL (ref 33.7–35.3)
MCV RBC AUTO: 93.6 FL (ref 81.4–97.8)
MICRO URNS: ABNORMAL
MONOCYTES # BLD AUTO: 0.98 K/UL (ref 0–0.85)
MONOCYTES NFR BLD AUTO: 11.2 % (ref 0–13.4)
NEUTROPHILS # BLD AUTO: 7.03 K/UL (ref 1.82–7.42)
NEUTROPHILS NFR BLD: 80.6 % (ref 44–72)
NITRITE UR QL STRIP.AUTO: NEGATIVE
NRBC # BLD AUTO: 0 K/UL
NRBC BLD-RTO: 0 /100 WBC
PH UR STRIP.AUTO: 7 [PH]
PLATELET # BLD AUTO: 271 K/UL (ref 164–446)
PMV BLD AUTO: 10.3 FL (ref 9–12.9)
POTASSIUM SERPL-SCNC: 4.3 MMOL/L (ref 3.6–5.5)
PROCALCITONIN SERPL-MCNC: 51.06 NG/ML
PROT SERPL-MCNC: 5.8 G/DL (ref 6–8.2)
PROT UR QL STRIP: NEGATIVE MG/DL
PROTHROMBIN TIME: 17.7 SEC (ref 12–14.6)
RBC # BLD AUTO: 3.13 M/UL (ref 4.7–6.1)
RBC UR QL AUTO: NEGATIVE
RHODAMINE-AURAMINE STN SPEC: NORMAL
SIGNIFICANT IND 70042: NORMAL
SITE SITE: NORMAL
SODIUM SERPL-SCNC: 131 MMOL/L (ref 135–145)
SOURCE SOURCE: NORMAL
SP GR UR STRIP.AUTO: 1.03
UROBILINOGEN UR STRIP.AUTO-MCNC: 0.2 MG/DL
VIT B12 SERPL-MCNC: 651 PG/ML (ref 211–911)
WBC # BLD AUTO: 8.7 K/UL (ref 4.8–10.8)

## 2018-10-10 PROCEDURE — 99291 CRITICAL CARE FIRST HOUR: CPT | Performed by: INTERNAL MEDICINE

## 2018-10-10 PROCEDURE — 501838 HCHG SUTURE GENERAL: Performed by: ORTHOPAEDIC SURGERY

## 2018-10-10 PROCEDURE — 96374 THER/PROPH/DIAG INJ IV PUSH: CPT

## 2018-10-10 PROCEDURE — 160028 HCHG SURGERY MINUTES - 1ST 30 MINS LEVEL 3: Performed by: ORTHOPAEDIC SURGERY

## 2018-10-10 PROCEDURE — 700111 HCHG RX REV CODE 636 W/ 250 OVERRIDE (IP): Performed by: STUDENT IN AN ORGANIZED HEALTH CARE EDUCATION/TRAINING PROGRAM

## 2018-10-10 PROCEDURE — 82746 ASSAY OF FOLIC ACID SERUM: CPT

## 2018-10-10 PROCEDURE — 87206 SMEAR FLUORESCENT/ACID STAI: CPT

## 2018-10-10 PROCEDURE — A6402 STERILE GAUZE <= 16 SQ IN: HCPCS | Performed by: ORTHOPAEDIC SURGERY

## 2018-10-10 PROCEDURE — 500881 HCHG PACK, EXTREMITY: Performed by: ORTHOPAEDIC SURGERY

## 2018-10-10 PROCEDURE — 500367 HCHG DRAIN KIT, HEMOVAC: Performed by: ORTHOPAEDIC SURGERY

## 2018-10-10 PROCEDURE — 160002 HCHG RECOVERY MINUTES (STAT): Performed by: ORTHOPAEDIC SURGERY

## 2018-10-10 PROCEDURE — 700111 HCHG RX REV CODE 636 W/ 250 OVERRIDE (IP): Performed by: INTERNAL MEDICINE

## 2018-10-10 PROCEDURE — 160039 HCHG SURGERY MINUTES - EA ADDL 1 MIN LEVEL 3: Performed by: ORTHOPAEDIC SURGERY

## 2018-10-10 PROCEDURE — 0QB00ZZ EXCISION OF LUMBAR VERTEBRA, OPEN APPROACH: ICD-10-PCS | Performed by: ORTHOPAEDIC SURGERY

## 2018-10-10 PROCEDURE — 87077 CULTURE AEROBIC IDENTIFY: CPT | Mod: 91

## 2018-10-10 PROCEDURE — 36415 COLL VENOUS BLD VENIPUNCTURE: CPT

## 2018-10-10 PROCEDURE — 700101 HCHG RX REV CODE 250: Performed by: STUDENT IN AN ORGANIZED HEALTH CARE EDUCATION/TRAINING PROGRAM

## 2018-10-10 PROCEDURE — 87015 SPECIMEN INFECT AGNT CONCNTJ: CPT

## 2018-10-10 PROCEDURE — 501445 HCHG STAPLER, SKIN DISP: Performed by: ORTHOPAEDIC SURGERY

## 2018-10-10 PROCEDURE — 700102 HCHG RX REV CODE 250 W/ 637 OVERRIDE(OP): Performed by: STUDENT IN AN ORGANIZED HEALTH CARE EDUCATION/TRAINING PROGRAM

## 2018-10-10 PROCEDURE — 82607 VITAMIN B-12: CPT

## 2018-10-10 PROCEDURE — 83605 ASSAY OF LACTIC ACID: CPT

## 2018-10-10 PROCEDURE — 700105 HCHG RX REV CODE 258: Performed by: INTERNAL MEDICINE

## 2018-10-10 PROCEDURE — 85025 COMPLETE CBC W/AUTO DIFF WBC: CPT

## 2018-10-10 PROCEDURE — 303105 HCHG CATHETER EXTRA

## 2018-10-10 PROCEDURE — 93005 ELECTROCARDIOGRAM TRACING: CPT

## 2018-10-10 PROCEDURE — 700111 HCHG RX REV CODE 636 W/ 250 OVERRIDE (IP)

## 2018-10-10 PROCEDURE — 99292 CRITICAL CARE ADDL 30 MIN: CPT | Performed by: INTERNAL MEDICINE

## 2018-10-10 PROCEDURE — 700105 HCHG RX REV CODE 258: Performed by: STUDENT IN AN ORGANIZED HEALTH CARE EDUCATION/TRAINING PROGRAM

## 2018-10-10 PROCEDURE — 160035 HCHG PACU - 1ST 60 MINS PHASE I: Performed by: ORTHOPAEDIC SURGERY

## 2018-10-10 PROCEDURE — 99291 CRITICAL CARE FIRST HOUR: CPT

## 2018-10-10 PROCEDURE — 160036 HCHG PACU - EA ADDL 30 MINS PHASE I: Performed by: ORTHOPAEDIC SURGERY

## 2018-10-10 PROCEDURE — 87205 SMEAR GRAM STAIN: CPT

## 2018-10-10 PROCEDURE — C1751 CATH, INF, PER/CENT/MIDLINE: HCPCS

## 2018-10-10 PROCEDURE — 80053 COMPREHEN METABOLIC PANEL: CPT

## 2018-10-10 PROCEDURE — 87186 SC STD MICRODIL/AGAR DIL: CPT | Mod: 91

## 2018-10-10 PROCEDURE — 87116 MYCOBACTERIA CULTURE: CPT

## 2018-10-10 PROCEDURE — 82728 ASSAY OF FERRITIN: CPT

## 2018-10-10 PROCEDURE — 87075 CULTR BACTERIA EXCEPT BLOOD: CPT

## 2018-10-10 PROCEDURE — 81003 URINALYSIS AUTO W/O SCOPE: CPT

## 2018-10-10 PROCEDURE — 93005 ELECTROCARDIOGRAM TRACING: CPT | Performed by: STUDENT IN AN ORGANIZED HEALTH CARE EDUCATION/TRAINING PROGRAM

## 2018-10-10 PROCEDURE — 85610 PROTHROMBIN TIME: CPT

## 2018-10-10 PROCEDURE — 770022 HCHG ROOM/CARE - ICU (200)

## 2018-10-10 PROCEDURE — 85730 THROMBOPLASTIN TIME PARTIAL: CPT

## 2018-10-10 PROCEDURE — 160009 HCHG ANES TIME/MIN: Performed by: ORTHOPAEDIC SURGERY

## 2018-10-10 PROCEDURE — A9270 NON-COVERED ITEM OR SERVICE: HCPCS | Performed by: STUDENT IN AN ORGANIZED HEALTH CARE EDUCATION/TRAINING PROGRAM

## 2018-10-10 PROCEDURE — 51702 INSERT TEMP BLADDER CATH: CPT

## 2018-10-10 PROCEDURE — 71045 X-RAY EXAM CHEST 1 VIEW: CPT

## 2018-10-10 PROCEDURE — 160048 HCHG OR STATISTICAL LEVEL 1-5: Performed by: ORTHOPAEDIC SURGERY

## 2018-10-10 PROCEDURE — A6222 GAUZE <=16 IN NO W/SAL W/O B: HCPCS | Performed by: ORTHOPAEDIC SURGERY

## 2018-10-10 PROCEDURE — 87086 URINE CULTURE/COLONY COUNT: CPT

## 2018-10-10 PROCEDURE — 700101 HCHG RX REV CODE 250

## 2018-10-10 PROCEDURE — 3E043XZ INTRODUCTION OF VASOPRESSOR INTO CENTRAL VEIN, PERCUTANEOUS APPROACH: ICD-10-PCS | Performed by: INTERNAL MEDICINE

## 2018-10-10 PROCEDURE — 700111 HCHG RX REV CODE 636 W/ 250 OVERRIDE (IP): Performed by: EMERGENCY MEDICINE

## 2018-10-10 PROCEDURE — 87040 BLOOD CULTURE FOR BACTERIA: CPT

## 2018-10-10 PROCEDURE — 700105 HCHG RX REV CODE 258

## 2018-10-10 PROCEDURE — 84145 PROCALCITONIN (PCT): CPT

## 2018-10-10 PROCEDURE — 36556 INSERT NON-TUNNEL CV CATH: CPT

## 2018-10-10 PROCEDURE — 87070 CULTURE OTHR SPECIMN AEROBIC: CPT

## 2018-10-10 PROCEDURE — 94760 N-INVAS EAR/PLS OXIMETRY 1: CPT

## 2018-10-10 RX ORDER — ACETAMINOPHEN 325 MG/1
650 TABLET ORAL EVERY 4 HOURS PRN
Status: DISCONTINUED | OUTPATIENT
Start: 2018-10-10 | End: 2018-10-14 | Stop reason: HOSPADM

## 2018-10-10 RX ORDER — DIAZEPAM 5 MG/1
5 TABLET ORAL
COMMUNITY
End: 2019-09-19

## 2018-10-10 RX ORDER — SODIUM CHLORIDE, SODIUM LACTATE, POTASSIUM CHLORIDE, CALCIUM CHLORIDE 600; 310; 30; 20 MG/100ML; MG/100ML; MG/100ML; MG/100ML
1000 INJECTION, SOLUTION INTRAVENOUS ONCE
Status: COMPLETED | OUTPATIENT
Start: 2018-10-10 | End: 2018-10-10

## 2018-10-10 RX ORDER — ONDANSETRON 2 MG/ML
4 INJECTION INTRAMUSCULAR; INTRAVENOUS
Status: DISCONTINUED | OUTPATIENT
Start: 2018-10-10 | End: 2018-10-10

## 2018-10-10 RX ORDER — OXYCODONE HYDROCHLORIDE 10 MG/1
10 TABLET ORAL
Status: DISCONTINUED | OUTPATIENT
Start: 2018-10-10 | End: 2018-10-10

## 2018-10-10 RX ORDER — OXYCODONE HCL 5 MG/5 ML
5 SOLUTION, ORAL ORAL
Status: DISCONTINUED | OUTPATIENT
Start: 2018-10-10 | End: 2018-10-10

## 2018-10-10 RX ORDER — MEPERIDINE HYDROCHLORIDE 25 MG/ML
12.5 INJECTION INTRAMUSCULAR; INTRAVENOUS; SUBCUTANEOUS
Status: DISCONTINUED | OUTPATIENT
Start: 2018-10-10 | End: 2018-10-10

## 2018-10-10 RX ORDER — OXYCODONE HYDROCHLORIDE 5 MG/1
5 TABLET ORAL
Status: DISCONTINUED | OUTPATIENT
Start: 2018-10-10 | End: 2018-10-10

## 2018-10-10 RX ORDER — BISACODYL 10 MG
10 SUPPOSITORY, RECTAL RECTAL
Status: DISCONTINUED | OUTPATIENT
Start: 2018-10-10 | End: 2018-10-14 | Stop reason: HOSPADM

## 2018-10-10 RX ORDER — POLYETHYLENE GLYCOL 3350 17 G/17G
1 POWDER, FOR SOLUTION ORAL
Status: DISCONTINUED | OUTPATIENT
Start: 2018-10-10 | End: 2018-10-14 | Stop reason: HOSPADM

## 2018-10-10 RX ORDER — MAGNESIUM HYDROXIDE 1200 MG/15ML
LIQUID ORAL
Status: COMPLETED | OUTPATIENT
Start: 2018-10-10 | End: 2018-10-10

## 2018-10-10 RX ORDER — IPRATROPIUM BROMIDE AND ALBUTEROL SULFATE 2.5; .5 MG/3ML; MG/3ML
3 SOLUTION RESPIRATORY (INHALATION)
Status: DISCONTINUED | OUTPATIENT
Start: 2018-10-10 | End: 2018-10-10

## 2018-10-10 RX ORDER — SODIUM CHLORIDE 9 MG/ML
500 INJECTION, SOLUTION INTRAVENOUS
Status: COMPLETED | OUTPATIENT
Start: 2018-10-10 | End: 2018-10-10

## 2018-10-10 RX ORDER — OXYCODONE HCL 5 MG/5 ML
10 SOLUTION, ORAL ORAL
Status: DISCONTINUED | OUTPATIENT
Start: 2018-10-10 | End: 2018-10-10

## 2018-10-10 RX ORDER — FAMOTIDINE 20 MG/1
20 TABLET, FILM COATED ORAL EVERY 12 HOURS
Status: DISCONTINUED | OUTPATIENT
Start: 2018-10-10 | End: 2018-10-10

## 2018-10-10 RX ORDER — HYDROMORPHONE HYDROCHLORIDE 2 MG/ML
1 INJECTION, SOLUTION INTRAMUSCULAR; INTRAVENOUS; SUBCUTANEOUS
Status: DISCONTINUED | OUTPATIENT
Start: 2018-10-10 | End: 2018-10-10

## 2018-10-10 RX ORDER — HALOPERIDOL 5 MG/ML
1 INJECTION INTRAMUSCULAR
Status: DISCONTINUED | OUTPATIENT
Start: 2018-10-10 | End: 2018-10-10

## 2018-10-10 RX ORDER — OXYCODONE HYDROCHLORIDE 15 MG/1
15 TABLET ORAL EVERY 4 HOURS PRN
Status: ON HOLD | COMMUNITY
End: 2019-09-26

## 2018-10-10 RX ORDER — HYDROMORPHONE HYDROCHLORIDE 2 MG/ML
0.4 INJECTION, SOLUTION INTRAMUSCULAR; INTRAVENOUS; SUBCUTANEOUS
Status: DISCONTINUED | OUTPATIENT
Start: 2018-10-10 | End: 2018-10-10

## 2018-10-10 RX ORDER — SODIUM CHLORIDE, SODIUM LACTATE, POTASSIUM CHLORIDE, CALCIUM CHLORIDE 600; 310; 30; 20 MG/100ML; MG/100ML; MG/100ML; MG/100ML
1000 INJECTION, SOLUTION INTRAVENOUS CONTINUOUS
Status: DISCONTINUED | OUTPATIENT
Start: 2018-10-10 | End: 2018-10-11

## 2018-10-10 RX ORDER — CYCLOBENZAPRINE HCL 10 MG
10 TABLET ORAL 3 TIMES DAILY PRN
COMMUNITY
End: 2019-09-19

## 2018-10-10 RX ORDER — HYDROMORPHONE HYDROCHLORIDE 2 MG/ML
0.2 INJECTION, SOLUTION INTRAMUSCULAR; INTRAVENOUS; SUBCUTANEOUS
Status: DISCONTINUED | OUTPATIENT
Start: 2018-10-10 | End: 2018-10-10

## 2018-10-10 RX ORDER — NOREPINEPHRINE BITARTRATE 1 MG/ML
INJECTION, SOLUTION INTRAVENOUS
Status: COMPLETED
Start: 2018-10-10 | End: 2018-10-10

## 2018-10-10 RX ORDER — DIPHENHYDRAMINE HYDROCHLORIDE 50 MG/ML
12.5 INJECTION INTRAMUSCULAR; INTRAVENOUS
Status: DISCONTINUED | OUTPATIENT
Start: 2018-10-10 | End: 2018-10-10

## 2018-10-10 RX ORDER — AMOXICILLIN 250 MG
2 CAPSULE ORAL 2 TIMES DAILY
Status: DISCONTINUED | OUTPATIENT
Start: 2018-10-10 | End: 2018-10-14 | Stop reason: HOSPADM

## 2018-10-10 RX ORDER — SODIUM CHLORIDE 9 MG/ML
INJECTION, SOLUTION INTRAVENOUS
Status: COMPLETED
Start: 2018-10-10 | End: 2018-10-10

## 2018-10-10 RX ORDER — ACETAMINOPHEN 325 MG/1
325 TABLET ORAL EVERY 4 HOURS PRN
Status: DISCONTINUED | OUTPATIENT
Start: 2018-10-10 | End: 2018-10-10

## 2018-10-10 RX ORDER — MIDAZOLAM HYDROCHLORIDE 1 MG/ML
1 INJECTION INTRAMUSCULAR; INTRAVENOUS
Status: DISCONTINUED | OUTPATIENT
Start: 2018-10-10 | End: 2018-10-10

## 2018-10-10 RX ORDER — SODIUM CHLORIDE, SODIUM GLUCONATE, SODIUM ACETATE, POTASSIUM CHLORIDE AND MAGNESIUM CHLORIDE 526; 502; 368; 37; 30 MG/100ML; MG/100ML; MG/100ML; MG/100ML; MG/100ML
500 INJECTION, SOLUTION INTRAVENOUS CONTINUOUS
Status: DISCONTINUED | OUTPATIENT
Start: 2018-10-10 | End: 2018-10-10

## 2018-10-10 RX ORDER — HYDROMORPHONE HYDROCHLORIDE 2 MG/ML
1 INJECTION, SOLUTION INTRAMUSCULAR; INTRAVENOUS; SUBCUTANEOUS
Status: DISCONTINUED | OUTPATIENT
Start: 2018-10-10 | End: 2018-10-12

## 2018-10-10 RX ORDER — HYDROMORPHONE HYDROCHLORIDE 2 MG/ML
0.1 INJECTION, SOLUTION INTRAMUSCULAR; INTRAVENOUS; SUBCUTANEOUS
Status: DISCONTINUED | OUTPATIENT
Start: 2018-10-10 | End: 2018-10-10

## 2018-10-10 RX ADMIN — HYDROMORPHONE HYDROCHLORIDE 1 MG: 2 INJECTION INTRAMUSCULAR; INTRAVENOUS; SUBCUTANEOUS at 19:09

## 2018-10-10 RX ADMIN — VANCOMYCIN HYDROCHLORIDE 1800 MG: 100 INJECTION, POWDER, LYOPHILIZED, FOR SOLUTION INTRAVENOUS at 10:31

## 2018-10-10 RX ADMIN — SODIUM CHLORIDE 500 ML: 9 INJECTION, SOLUTION INTRAVENOUS at 17:45

## 2018-10-10 RX ADMIN — SODIUM CHLORIDE, POTASSIUM CHLORIDE, SODIUM LACTATE AND CALCIUM CHLORIDE 1000 ML: 600; 310; 30; 20 INJECTION, SOLUTION INTRAVENOUS at 14:00

## 2018-10-10 RX ADMIN — PIPERACILLIN AND TAZOBACTAM 3.38 G: 3; .375 INJECTION, POWDER, LYOPHILIZED, FOR SOLUTION INTRAVENOUS; PARENTERAL at 16:07

## 2018-10-10 RX ADMIN — NOREPINEPHRINE BITARTRATE 3 MCG/MIN: 1 INJECTION INTRAVENOUS at 19:00

## 2018-10-10 RX ADMIN — SODIUM CHLORIDE: 9 INJECTION, SOLUTION INTRAVENOUS at 18:45

## 2018-10-10 RX ADMIN — PIPERACILLIN AND TAZOBACTAM 3.38 G: 3; .375 INJECTION, POWDER, LYOPHILIZED, FOR SOLUTION INTRAVENOUS; PARENTERAL at 19:56

## 2018-10-10 RX ADMIN — ACETAMINOPHEN 650 MG: 325 TABLET, FILM COATED ORAL at 17:36

## 2018-10-10 RX ADMIN — SODIUM CHLORIDE 250 ML: 9 INJECTION, SOLUTION INTRAVENOUS at 11:46

## 2018-10-10 RX ADMIN — SODIUM CHLORIDE, POTASSIUM CHLORIDE, SODIUM LACTATE AND CALCIUM CHLORIDE 1000 ML: 600; 310; 30; 20 INJECTION, SOLUTION INTRAVENOUS at 09:56

## 2018-10-10 RX ADMIN — SODIUM CHLORIDE 3 G: 900 INJECTION INTRAVENOUS at 11:46

## 2018-10-10 RX ADMIN — HYDROMORPHONE HYDROCHLORIDE 1 MG: 2 INJECTION INTRAMUSCULAR; INTRAVENOUS; SUBCUTANEOUS at 20:30

## 2018-10-10 RX ADMIN — SODIUM CHLORIDE, POTASSIUM CHLORIDE, SODIUM LACTATE AND CALCIUM CHLORIDE 1000 ML: 600; 310; 30; 20 INJECTION, SOLUTION INTRAVENOUS at 20:47

## 2018-10-10 RX ADMIN — HYDROMORPHONE HYDROCHLORIDE 1 MG: 2 INJECTION INTRAMUSCULAR; INTRAVENOUS; SUBCUTANEOUS at 15:00

## 2018-10-10 RX ADMIN — HYDROMORPHONE HYDROCHLORIDE 1 MG: 2 INJECTION INTRAMUSCULAR; INTRAVENOUS; SUBCUTANEOUS at 11:01

## 2018-10-10 RX ADMIN — SODIUM CHLORIDE, POTASSIUM CHLORIDE, SODIUM LACTATE AND CALCIUM CHLORIDE 1000 ML: 600; 310; 30; 20 INJECTION, SOLUTION INTRAVENOUS at 18:30

## 2018-10-10 RX ADMIN — SODIUM CHLORIDE, POTASSIUM CHLORIDE, SODIUM LACTATE AND CALCIUM CHLORIDE 1000 ML: 600; 310; 30; 20 INJECTION, SOLUTION INTRAVENOUS at 14:17

## 2018-10-10 RX ADMIN — NOREPINEPHRINE BITARTRATE 8 MG: 1 INJECTION INTRAVENOUS at 18:45

## 2018-10-10 RX ADMIN — ACETAMINOPHEN 325 MG: 325 TABLET, FILM COATED ORAL at 10:59

## 2018-10-10 RX ADMIN — VANCOMYCIN HYDROCHLORIDE 1300 MG: 100 INJECTION, POWDER, LYOPHILIZED, FOR SOLUTION INTRAVENOUS at 17:02

## 2018-10-10 RX ADMIN — FENTANYL CITRATE 100 MCG: 50 INJECTION, SOLUTION INTRAMUSCULAR; INTRAVENOUS at 04:10

## 2018-10-10 ASSESSMENT — ENCOUNTER SYMPTOMS
FOCAL WEAKNESS: 0
BRUISES/BLEEDS EASILY: 0
HEARTBURN: 1
ARTHRALGIAS: 0
LOSS OF CONSCIOUSNESS: 0
BLOOD IN STOOL: 0
NECK STIFFNESS: 0
VOICE CHANGE: 0
NAUSEA: 1
SORE THROAT: 0
TROUBLE SWALLOWING: 0
PALPITATIONS: 0
WOUND: 1
CONFUSION: 0
TREMORS: 1
BACK PAIN: 1
SENSORY CHANGE: 0
MYALGIAS: 0
DIZZINESS: 1
ABDOMINAL PAIN: 1
ABDOMINAL PAIN: 0
SEIZURES: 0
FALLS: 0
WEAKNESS: 1
POLYDIPSIA: 0
WHEEZING: 0
HEADACHES: 0
LIGHT-HEADEDNESS: 1
APPETITE CHANGE: 1
HEMOPTYSIS: 0
DIAPHORESIS: 1
CHILLS: 1
BLURRED VISION: 0
DOUBLE VISION: 0
SPEECH CHANGE: 0
COUGH: 0
NECK PAIN: 0
SHORTNESS OF BREATH: 0
COLOR CHANGE: 1
FEVER: 1
DEPRESSION: 0
VOMITING: 1

## 2018-10-10 ASSESSMENT — PAIN SCALES - GENERAL
PAINLEVEL_OUTOF10: 7
PAINLEVEL_OUTOF10: 6
PAINLEVEL_OUTOF10: 1
PAINLEVEL_OUTOF10: 7
PAINLEVEL_OUTOF10: 7
PAINLEVEL_OUTOF10: 1
PAINLEVEL_OUTOF10: 1
PAINLEVEL_OUTOF10: 2
PAINLEVEL_OUTOF10: 3
PAINLEVEL_OUTOF10: 2
PAINLEVEL_OUTOF10: 1
PAINLEVEL_OUTOF10: 3
PAINLEVEL_OUTOF10: 1
PAINLEVEL_OUTOF10: 7
PAINLEVEL_OUTOF10: 5
PAINLEVEL_OUTOF10: 1
PAINLEVEL_OUTOF10: 6

## 2018-10-10 ASSESSMENT — COPD QUESTIONNAIRES
DO YOU EVER COUGH UP ANY MUCUS OR PHLEGM?: NO/ONLY WITH OCCASIONAL COLDS OR INFECTIONS
DURING THE PAST 4 WEEKS HOW MUCH DID YOU FEEL SHORT OF BREATH: NONE/LITTLE OF THE TIME
COPD SCREENING SCORE: 1
HAVE YOU SMOKED AT LEAST 100 CIGARETTES IN YOUR ENTIRE LIFE: NO/DON'T KNOW

## 2018-10-10 ASSESSMENT — LIFESTYLE VARIABLES
SUBSTANCE_ABUSE: 0
EVER_SMOKED: NEVER

## 2018-10-10 NOTE — ED TRIAGE NOTES
Chief Complaint   Patient presents with   • Fever     BIB EMS from [facility]; pt presents with N/V flu like symptoms x couple days; PTA tx fentanyl 100mcg 0310, zofran, vancomycin, 1 Gm tylenol, unasyn, 3L NS; reported septic shock 88/50 after bolus, lactic 1.3; hx of lumbar fusion 3-5 on 9/20. incision site red, edema, draining purulent; Dr. Wei at bedside   • Back Pain       Blood cultures x 2, lactic acid, rainbow, urine sent to lab    Temp jeffries placed    Pt arrived with R IJ triple lumen and PIV x 1

## 2018-10-10 NOTE — H&P
Internal Medicine Admitting History and Physical    Note Author: Dayne Wolf M.D.       Name Tomás Urrutia     1960   Age/Sex 58 y.o. male   MRN [id number]   Code Status FULL     After 5PM or if no immediate response to page, please call for cross-coverage  Attending/Team: Rajinder/Manjeet See Patient List for primary contact information  Call [phone] to page    1st Call - Day Intern (R1):   N/A 2nd Call - Day Sr. Resident (R2/R3):   Gurmeet       Chief Complaint:   Sepsis 2/2 lumbar surgical site wound infection    HPI:  58-year-old male with a past medical history of spondylolisthesis, lumbar spinal stenosis, lumbar radiculopathy, lumbar degenerative disk disease S/P L3-L5 transforaminal lumbar interbody instrumented fusion and L2-S1 decompressive laminectomy with foraminotomies on 18 was transferred from [facility] where he presented with a 3-day history of worsening flulike symptoms, ABD pain, N/V, and worsening excruciating lumbar back pain. He reports F/C and night sweats.  He denied lower extremity weakness/numbeness, saddle anesthesia, loss of bowel, bladder function, chest pain, palpitation, IV drug use or recent trauma.    At [facility] he was noted for septic shock as he was presenting with hypotension, fever, tachypnea and tachycardia. He was noted for Erythematous mid back lumbar surgical site that was expressing purelent drainage. CT-lumbar spine shows L3-L5 laminectomy. Minimal subcutaneous air along incision tract Blood samples were collected for cultures and patient was started on IV Unasyn and Vancomycin as well as 2 L bolus.  Right IJ line was placed but no pressors were initiated. He was transferred to [facility]    At the [facility] ED, he was noted for hypotension and received sepsis boluses. No leukocytosis on CBC but nut shift noted. He hypotension resolved with IVF and SpO2 > 90% on 8L NC. EDP discussed case with Orthopedic surgery who agreed to follow and  will schedule patent for OR censing today. He was admitted to the ICU for Sepsis monitoring and Post-op care.      Review of Systems   Constitutional: Positive for chills, diaphoresis, fever and malaise/fatigue.   HENT: Negative for hearing loss and tinnitus.    Eyes: Negative for blurred vision and double vision.   Respiratory: Negative for cough and hemoptysis.    Cardiovascular: Negative for chest pain and palpitations.   Gastrointestinal: Positive for abdominal pain, heartburn, nausea and vomiting. Negative for blood in stool and melena.   Genitourinary: Negative for dysuria and urgency.   Musculoskeletal: Positive for back pain and joint pain. Negative for falls and neck pain.   Skin: Positive for rash.   Neurological: Positive for dizziness, tremors and weakness. Negative for sensory change, speech change, focal weakness, seizures, loss of consciousness and headaches.   Endo/Heme/Allergies: Negative for polydipsia. Does not bruise/bleed easily.   Psychiatric/Behavioral: Negative for depression and suicidal ideas.             Past Medical History (Chronic medical problem, known complications and current treatment)    spondylolisthesis  lumbar spinal stenosis  lumbar radiculopathy  lumbar degenerative disk disease    Past Surgical History:  Past Surgical History:   Procedure Laterality Date   • LUMBAR FUSION POSTERIOR  9/20/2018    Procedure: TRANSFORAMINAL LUMBAR 3 - 5  INTERBODY FUSION;  Surgeon: Romain Sanders M.D.;  Location: SURGERY [facility];  Service: Orthopedics   • LUMBAR DECOMPRESSION  9/20/2018    Procedure: LUMBAR 2- SACRAL 1   DECOMPRESSION;  Surgeon: Romain Sanders M.D.;  Location: [facility];  Service: Orthopedics   • OTHER ORTHOPEDIC SURGERY  1974/2000    tendon surgeries left hand       Current Outpatient Medications:  Home Medications    **Home medications have not yet been reviewed for this encounter**         Medication Allergy/Sensitivities:  No Known Allergies      Family  "History (mandatory)   Family History   Problem Relation Age of Onset   • Stroke Father    • Stroke Paternal Grandmother        Social History (mandatory)   Social History     Social History   • Marital status: Single     Spouse name: N/A   • Number of children: N/A   • Years of education: N/A     Occupational History   • Not on file.     Social History Main Topics   • Smoking status: Never Smoker   • Smokeless tobacco: Never Used   • Alcohol use Yes      Comment: 3 per week   • Drug use: No   • Sexual activity: Not on file     Other Topics Concern   • Not on file     Social History Narrative   • No narrative on file       PCP : Gregorio Chamberlain M.D.    Physical Exam     Vitals:    10/10/18 0416 10/10/18 0430 10/10/18 0440 10/10/18 0553   BP:       Pulse: (!) 112 (!) 110 (!) 116 (!) 114   Resp: (!) 21  (!) 23 (!) 22   Temp:    (!) 38.8 °C (101.8 °F)   SpO2: 96% 100% 100% 100%   Weight:       Height:         Body mass index is 22.32 kg/m².  /82   Pulse (!) 114   Temp (!) 38.8 °C (101.8 °F)   Resp (!) 22   Ht 1.803 m (5' 11\")   Wt 72.6 kg (160 lb)   SpO2 100%   BMI 22.32 kg/m²   O2 therapy: Pulse Oximetry: 100 %, O2 (LPM): 8, O2 Delivery: Nasal Cannula    Physical Exam   Constitutional: He is oriented to person, place, and time. He appears distressed.   HENT:   Head: Normocephalic and atraumatic.   Eyes: Pupils are equal, round, and reactive to light. Conjunctivae and EOM are normal. Right eye exhibits no discharge. Left eye exhibits no discharge. No scleral icterus.   Neck: Normal range of motion. Neck supple. No thyromegaly present.   Cardiovascular: Normal rate and regular rhythm.    Pulmonary/Chest: Effort normal.   Abdominal: Soft. Bowel sounds are normal. He exhibits no distension. There is no tenderness.   Musculoskeletal: Normal range of motion. He exhibits edema.   Neurological: He is alert and oriented to person, place, and time. No cranial nerve deficit. GCS score is 15.   LE 5/5 strength " B/L  LE sensation intact all over   Skin: Skin is warm. Rash noted. He is diaphoretic. There is erythema. No pallor.   Psychiatric: Mood, memory, affect and judgment normal.         Data Review       Old Records Request:   Completed  Current Records review/summary: Completed    Lab Data Review:  Recent Results (from the past 24 hour(s))   EKG    Collection Time: 10/10/18  3:46 AM   Result Value Ref Range    Report       [facility]    Test Date:  2018-10-10  Pt Name:    TRACY GARCIA               Department: ER  MRN:        [id number]                      Room:       BL 15  Gender:     Male                         Technician: [id number]  :        1960                   Requested By:KALA DANIELS  Order #:    [id number]                    Reading MD:    Measurements  Intervals                                Axis  Rate:       109                          P:          0  AZ:         91                           QRS:        57  QRSD:       98                           T:          14  QT:         356  QTc:        480    Interpretive Statements  SINUS TACHYCARDIA  CONSIDER ANTERIOR INFARCT  MINIMAL ST DEPRESSION, INFERIOR LEADS  BORDERLINE PROLONGED QT INTERVAL  Compared to ECG 2018 09:52:44  Myocardial infarct finding now present  Sinus bradycardia no longer present  Short AZ interval no longer present  T-wave abnormality no longer present  ST (T wave)  deviation still present     CBC WITH DIFFERENTIAL    Collection Time: 10/10/18  3:55 AM   Result Value Ref Range    WBC 8.7 4.8 - 10.8 K/uL    RBC 3.13 (L) 4.70 - 6.10 M/uL    Hemoglobin 10.4 (L) 14.0 - 18.0 g/dL    Hematocrit 29.3 (L) 42.0 - 52.0 %    MCV 93.6 81.4 - 97.8 fL    MCH 33.2 (H) 27.0 - 33.0 pg    MCHC 35.5 (H) 33.7 - 35.3 g/dL    RDW 42.5 35.9 - 50.0 fL    Platelet Count 271 164 - 446 K/uL    MPV 10.3 9.0 - 12.9 fL    Neutrophils-Polys 80.60 (H) 44.00 - 72.00 %    Lymphocytes 7.50 (L) 22.00 - 41.00 %     Monocytes 11.20 0.00 - 13.40 %    Eosinophils 0.00 0.00 - 6.90 %    Basophils 0.20 0.00 - 1.80 %    Immature Granulocytes 0.50 0.00 - 0.90 %    Nucleated RBC 0.00 /100 WBC    Neutrophils (Absolute) 7.03 1.82 - 7.42 K/uL    Lymphs (Absolute) 0.65 (L) 1.00 - 4.80 K/uL    Monos (Absolute) 0.98 (H) 0.00 - 0.85 K/uL    Eos (Absolute) 0.00 0.00 - 0.51 K/uL    Baso (Absolute) 0.02 0.00 - 0.12 K/uL    Immature Granulocytes (abs) 0.04 0.00 - 0.11 K/uL    NRBC (Absolute) 0.00 K/uL   CMP    Collection Time: 10/10/18  3:55 AM   Result Value Ref Range    Sodium 131 (L) 135 - 145 mmol/L    Potassium 4.3 3.6 - 5.5 mmol/L    Chloride 98 96 - 112 mmol/L    Co2 21 20 - 33 mmol/L    Anion Gap 12.0 (H) 0.0 - 11.9    Glucose 104 (H) 65 - 99 mg/dL    Bun 13 8 - 22 mg/dL    Creatinine 0.83 0.50 - 1.40 mg/dL    Calcium 7.5 (L) 8.5 - 10.5 mg/dL    AST(SGOT) 24 12 - 45 U/L    ALT(SGPT) 18 2 - 50 U/L    Alkaline Phosphatase 41 30 - 99 U/L    Total Bilirubin 0.6 0.1 - 1.5 mg/dL    Albumin 3.1 (L) 3.2 - 4.9 g/dL    Total Protein 5.8 (L) 6.0 - 8.2 g/dL    Globulin 2.7 1.9 - 3.5 g/dL    A-G Ratio 1.1 g/dL   APTT (PTT)    Collection Time: 10/10/18  3:55 AM   Result Value Ref Range    APTT 35.9 24.7 - 36.0 sec   Prothrombin time (INR)    Collection Time: 10/10/18  3:55 AM   Result Value Ref Range    PT 17.7 (H) 12.0 - 14.6 sec    INR 1.44 (H) 0.87 - 1.13   ESTIMATED GFR    Collection Time: 10/10/18  3:55 AM   Result Value Ref Range    GFR If African American >60 >60 mL/min/1.73 m 2    GFR If Non African American >60 >60 mL/min/1.73 m 2   URINALYSIS    Collection Time: 10/10/18  5:18 AM   Result Value Ref Range    Color Yellow     Character Clear     Specific Gravity 1.026 <1.035    Ph 7.0 5.0 - 8.0    Glucose Negative Negative mg/dL    Ketones 40 (A) Negative mg/dL    Protein Negative Negative mg/dL    Bilirubin Negative Negative    Urobilinogen, Urine 0.2 Negative    Nitrite Negative Negative    Leukocyte Esterase Negative Negative    Occult Blood  Negative Negative    Micro Urine Req see below    LACTIC ACID    Collection Time: 10/10/18  5:24 AM   Result Value Ref Range    Lactic Acid 1.8 0.5 - 2.0 mmol/L       Imaging/Procedures Review:    Independant Imaging Review: Completed  DX-CHEST-PORTABLE (1 VIEW)   Final Result         1.  No acute cardiopulmonary disease.             EKG:   EKG Independant Review: Completed  QTc:480, HR: 109, Normal Sinus Rhythm, no ST/T changes     Records reviewed and summarized in current documentation :  Yes  [employer] teaching service handout given to patient:  No         Assessment/Plan     * Sepsis (HCC)- (present on admission)   Assessment & Plan    This is sepsis (without associated acute organ dysfunction).   SIRS 3/4 (HR, RR and Temp)  qSOFA 2/3 (RR and SBP)  Right IJ placed at outside facility, did not receive pressers  Received sepsis fluids at ED, HoTN improved  2/2 to lumbar surgical site infection  Was started on IV Vanc/Annetta at outside facility  Blood and wound Clx collected at ED  EDP discussed case with Orth surg, pt for I&D today  Admit to ICU  Neuro checks q 4  Aspiration/Fall/Seizure precautions   Continue IV hydration  Placed on RT  Continue IV Annetta/Vanc  PRN IVF bolus if MAP <65 mmHg  Continue to trend LA  Pending Procal  Daily CXR  Follow Blood/Respi/Urine/Wound and OR cultures   Orthopedic Surgery (Dr. Manzano), following and pt for I&D today  Follow post op course        Anemia- (present on admission)   Assessment & Plan    Normocytic anemia  Pending Folate/B12/Iron studies  Labs on AM        Rigors- (present on admission)   Assessment & Plan    Continue PRN tylenol        Hyponatremia- (present on admission)   Assessment & Plan    Likely 2/2 pain (ADH)  Continue IV hydration  Labs on AM        Surgical site infection- (present on admission)   Assessment & Plan    Noted for mid line lumbar surgical site erythema, edema and purulent secretion  Continue IV ABx and follow cultures  Consulted wound care and PT             Anticipated Hospital stay:  >2 midnights      Quality Measures  Quality-Core Measures   Gilbert catheter::  No Gilbert and Critically Ill - Requiring Accurate Measurement of Urinary Output  DVT prophylaxis - mechanical:  SCDs    PCP: Gregorio Chamberlain M.D.

## 2018-10-10 NOTE — PROGRESS NOTES
"[facility] ICU Progress Note      Admit Date: 10/10/2018  ICU Day: 1    Resident(s): Alannah Yi  Attending: ISAIAS SPICER/ Dr. Estrada    Date & Time:   10/10/2018   12:47 PM       Patient ID:    Name:             Tomás Urrutia   YOB: 1960  Age:                 58 y.o.  male   MRN:               [id number]    HPI:  Patient is a 59 y/o male with a PMH of low back pain s/p lumbar fusion on 9/20/18 presented as a transfer from [facility] for further evaluation of sepsis 2/2 surgical wound infection. Central line was placed prior to transfer for hypotension. CT at OSH showed an epidural collection. Patient received fluids per sepsis protocol, BP improved with IVF and no pressors initiated. Orthopedic surgery was consulted and he is s/p I&D with complex wound closure. Was transferred to ICU for post op monitoring.    Consultants:    PMA: Dr. Estrada  Orthopedic surgeon: Dr. Manzano    Interval Events:  Patient sedated from surgery, but easily arousable  BP running slightly high with SBP in 140s    WBC at 8.7, Lactic acid wnl  Procalcitonin highly elevated at 51.06, Ferritin elevated at 756.7 indicating acute inflammatory changes  U/A wnl, CXR showed no acute cardiopulmonary process  Currently on unasyn and vanco for wound infection  S/p I&D with wound closure  F/u blood and wound cx    Hb at 10.4, B12 and folate wnl, f/u iron studies    Review of Systems   Unable to perform ROS: Other   Sedated from surgery    PHYSICAL EXAM  Vitals:    10/10/18 0837 10/10/18 0900 10/10/18 0907 10/10/18 0930   BP:       Pulse: (!) 111 (!) 105 (!) 107 (!) 108   Resp: (!) 6 16 17 16   Temp:  (!) 38.1 °C (100.6 °F)     SpO2: 100%  100%    Weight: 75.6 kg (166 lb 10.7 oz)      Height:         Body mass index is 23.25 kg/m².  /82   Pulse (!) 108   Temp (!) 38.1 °C (100.6 °F)   Resp 16   Ht 1.803 m (5' 11\")   Wt 75.6 kg (166 lb 10.7 oz)   SpO2 100%   BMI 23.25 kg/m²   O2 therapy: Pulse Oximetry: 100 %, O2 (LPM): 2, " O2 Delivery: Silicone Nasal Cannula    Physical Exam   Constitutional: He is well-developed, well-nourished, and in no distress.   Sedated from surgery   HENT:   Head: Normocephalic and atraumatic.   Eyes: Pupils are equal, round, and reactive to light. EOM are normal.   Cardiovascular: Normal rate and regular rhythm.    Pulmonary/Chest: Effort normal and breath sounds normal.   Abdominal: Soft. He exhibits no distension.   Neurological:   Sedated from surgery   Skin: Skin is warm.       Respiratory:     Respiration: 16, Pulse Oximetry: 100 %    Chest Tube Drains:          Invalid input(s): [id number]    HemoDynamics:  Pulse: (!) 108, Heart Rate (Monitored): (!) 108 Blood Pressure: 119/82, Arterial BP: (!) 164/77, NIBP: 142/90      Neuro:      Fluids:    Date 10/10/18 0700 - 10/11/18 0659   Shift 8492-9282 7635-0873 4020-6923 24 Hour Total   I  N  T  A  K  E   P.O. 100   100      P.O. 100   100    I.V. 2219.2   2219.2      Crystalloid Intake 2000 2000      Other Colloid Intake 200   200      Volume (mL) (electrolyte-A (PLASMALYTE-A) infusion 500 mL) 19.2   19.2    Shift Total 2319.2   2319.2   O  U  T  P  U  T   Urine 760   760      Urine Void (mL) 250   250      Output (mL) (Urethral Catheter Temperature probe;Latex) 510   510    Drains 90   90      Output (mL) (Closed/Suction Drain 1 Back Hemovac) 90   90    Blood 100   100      Est. Blood Loss (mL) 100   100    Shift Total 950   950   NET 1369.2   1369.2        Intake/Output Summary (Last 24 hours) at 10/10/18 1200  Last data filed at 10/10/18 1103   Gross per 24 hour   Intake          2219.17 ml   Output             2150 ml   Net            69.17 ml       Weight: 75.6 kg (166 lb 10.7 oz)  Body mass index is 23.25 kg/m².    Recent Labs      10/10/18   0355   SODIUM  131*   POTASSIUM  4.3   CHLORIDE  98   CO2  21   BUN  13   CREATININE  0.83   CALCIUM  7.5*       GI/Nutrition:  Recent Labs      10/10/18   0355   ALTSGPT  18   ASTSGOT  24   ALKPHOSPHAT  41    TBILIRUBIN  0.6   GLUCOSE  104*       Heme:  Recent Labs      10/10/18   0355  10/10/18   0911   RBC  3.13*   --    HEMOGLOBIN  10.4*   --    HEMATOCRIT  29.3*   --    PLATELETCT  271   --    PROTHROMBTM  17.7*   --    APTT  35.9   --    INR  1.44*   --    FERRITIN   --   756.7*       Infectious Disease:  Monitored Temp 2  Av.8 °C (100 °F)  Min: 36 °C (96.8 °F)  Max: 39.2 °C (102.56 °F)  Temp  Av.2 °C (100.7 °F)  Min: 37.5 °C (99.5 °F)  Max: 38.8 °C (101.8 °F)  Recent Labs      10/10/18   035   WBC  8.7   NEUTSPOLYS  80.60*   LYMPHOCYTES  7.50*   MONOCYTES  11.20   EOSINOPHILS  0.00   BASOPHILS  0.20   ASTSGOT  24   ALTSGPT  18   ALKPHOSPHAT  41   TBILIRUBIN  0.6       Meds:  • senna-docusate  2 Tab      And   • polyethylene glycol/lytes  1 Packet      And   • magnesium hydroxide  30 mL      And   • bisacodyl  10 mg     • Respiratory Care per Protocol       • MD Alert...Adult ICU Electrolyte Replacement per Pharmacy       • MD Alert...Vancomycin per Pharmacy       • ampicillin-sulbactam (UNASYN) IV  3 g 3 g (10/10/18 1146)   • NS  500 mL     • LR  1,000 mL 1,000 mL (10/10/18 0956)   • HYDROmorphone  1 mg     • acetaminophen  325 mg     • vancomycin  17 mg/kg          Procedures:  I&D with wound closure 10/10    Imaging:  DX-CHEST-PORTABLE (1 VIEW)   Final Result         1.  No acute cardiopulmonary disease.          Problem and Plan:      * Sepsis (HCC)- (present on admission)   Assessment & Plan    This is sepsis (without associated acute organ dysfunction).   SIRS 3/4 (HR, RR and Temp)  qSOFA 2/3 (RR and SBP)  Right IJ placed at outside facility, did not receive pressers  Received sepsis fluids at ED, HoTN improved  Sepsis 2/2 to lumbar surgical site infection  Was started on IV Vanc/Annetta at outside facility  Blood and wound Clx collected at ED  EDP discussed case with Orth surg, s/p I&D and wound closure  Elevated procalcitonin and ferritin levels  WBC and LA wnl  Plan:  Admitted to ICU s/p I&D and  wound closure  Continue IVF with LR  Continue IV Unasyn and Vancomycin  Dilaudid 1mg q3hrs PRN for pain  PRN IVF bolus if MAP <65 mmHg  Daily CXR  Follow Blood/Respi/Urine/Wound and OR cultures   Orthopedic Surgery (Dr. Manzano) following   Regular diet s/p swallow eval  Neuro checks q 4  Aspiration/Fall/Seizure precautions         Surgical site infection- (present on admission)   Assessment & Plan    Noted for mid line lumbar surgical site erythema, edema and purulent secretion  S/p I&D and wound closure  Continue IV ABx and follow cultures  Consulted wound care and PT        Anemia- (present on admission)   Assessment & Plan    Normocytic anemia  Folate/B12 wnl  F/u iron studies  CTM Hb levels        Rigors- (present on admission)   Assessment & Plan    Resolved  Continue PRN tylenol        Hyponatremia- (present on admission)   Assessment & Plan    Likely 2/2 pain (ADH)  Continue IV hydration  ctm            DISPO: ICU for post op monitoring    CODE STATUS: FULL    Quality Measures:  Gilbert Catheter: Yes  DVT Prophylaxis: SCDs given post op status  Ulcer Prophylaxis: n/a  Antibiotics: Unasyn , vancomycin  Lines: Central line, Arterial line, PIV

## 2018-10-10 NOTE — PROGRESS NOTES
[facility] resident updated on patients arrival.  Notified resident that orders present are for recovery, not for unit

## 2018-10-10 NOTE — OP REPORT
DATE OF SERVICE:  10/10/2018    PREOPERATIVE DIAGNOSIS:  Infected postoperative spine with sepsis.    POSTOPERATIVE DIAGNOSIS:  Infected postoperative spine with sepsis with pus   coming from the hardware deep into the spine.    PROCEDURES PERFORMED:  1.  Wound exploration.  2.  Removal of loose bodies, removal of deep sutures.  3.  Irrigation and debridement to spine with complex wound closure.    SURGEON:  Rishabh Manzano MD    ANESTHESIOLOGIST:  Dr. Adarsh Rodriguez.    ANESTHESIA:  General.    COMPLICATIONS:  None.    DRAINS:  2 Hemovacs.    TOURNIQUET TIME:  None.    ESTIMATED BLOOD LOSS:  20 mL.    FLUIDS:  Crystalloid.    INDICATIONS:  The patient has had previous spine surgery 09/20.  He has had   chronic draining wound for a week.  Came in septic to [facility], needed   pressors to help with his pressor, emergently referred to [facility].    I was called.  I evaluated the patient.  It was felt that he was septic with   chronic draining wound.  This did seem superficial, had no neurological   deficits.  The risks, benefits, and options were discussed with him, wished to   undergo the above procedure.    Discussed with Dr. Sanders who completely agreed.    FINDINGS:  From the superior portion, there was loss of the fascia with deep   pus coming from this region.  There was pus all around the spinous processes   and the deep hardware, which was all removed and debrided.  There were   multiple fragments of bone graft as well as a running deep Vicryl sutures,   which were all removed as well as superficial sutures.    The deep figure-of-eight vertical retaining sutures were used to repair the   fascia and deep tissues.  Staples were used to close skin cover over drains.    PROCEDURE IN DETAIL:  Patient was taken to the operating room, general   anesthesia was induced, all pressure points well padded, placed in a prone   position.  SCDs were placed on his legs.  Knees were flexed.  All pressure   points well  padded.    The spine was prepped and draped in usual fashion.    ____ was then placed.    Inferiorly, the wound was easily opened as well as superiorly where there was   rob pus.  His skin incision was then used in order to connect the 2 wounds.    There was deep pus in this region.  This was copiously irrigated.    Superiorly, there was noted to be a break in the fascia with pus coming from   this region.    Skin incision was carried out further cephalad.  Then, the fascia was easily   opened with pus had been exposed deep to this.    Deep cultures were obtained at this time around the hardware and bone.    Three liters of pulsatile lavage was used in order to remove this.  There was   noted to be significant graft throughout this area, which was all removed.    Any loose fragments were devitalized or removed.    Rongeur was used in order to remove any fibrous tissue and devitalized tissue.    There was good healthy bleeding from the muscle.    Deep drains were then placed over the hardware, but deep to the fascia.    A #2 nylon retaining figure-of-eight vertical sutures were placed in order to   reapproximate the deep fascia without placing deep sutures near the infection.    The skin was then closed with staples.  Xeroform was then placed.  Drains were   sutured in.  Bulky dressing was applied.  Patient was taken to recovery room   in stable condition.  Dr. Sanders is well aware of the patient and will see him   today.       ____________________________________     MD MAGDALENE JULIEN / WEST    DD:  10/10/2018 07:21:59  DT:  10/10/2018 08:11:34    D#:  [id number]  Job#:  [id number]

## 2018-10-10 NOTE — ASSESSMENT & PLAN NOTE
Fever control with APAP, rigors related to gm negative bacteremia- treatment is surgical drainage/source control (done) and antibiotics

## 2018-10-10 NOTE — WOUND TEAM
Wound consult placed by [facility] regarding patients infected lumbar surgical site early this AM. Per chart review patient went to surgery for this and had an I&D of the site at 0545 with Dr. Manzano. Per chart review pt has no other injuries or concerns for pressure injuries. This RN up to T620/00. Discussed with bedside RN, Romain, who stated wound care is not necessary at this time as neurosurgery is caring for site. Wound consult completed. No further follow up unless consulted.

## 2018-10-10 NOTE — ASSESSMENT & PLAN NOTE
Sepsis resolved now with surgical drainage and antibiotics. Proteus in wound and blood that is pan-senstive. Would treat for two week course of antibiotics (when change to oral use Amoxicillin)

## 2018-10-10 NOTE — PROGRESS NOTES
Skin check completed with Eliot, no breakdown noted.  Dressing over surgical incision, no drainage noted.

## 2018-10-10 NOTE — ED PROVIDER NOTES
ED Provider Note    Scribed for Gregorio Wei II, M.D. by Bharath Bennett. 10/10/2018  3:51 AM    Means of Arrival: Ambulance  History obtained by: Patient  Limitations: None    CHIEF COMPLAINT  Chief Complaint   Patient presents with   • Fever     BIB EMS from [facility]; pt presents with N/V flu like symptoms x couple days; PTA tx fentanyl 100mcg 0310, zofran, vancomycin, 1 Gm tylenol, unasyn, 3L NS; reported septic shock 88/50 after bolus, lactic 1.3; hx of lumbar fusion 3-5 on 9/20. incision site red, edema, draining purulent; Dr. Wei at bedside   • Back Pain       HPI  Tomás Urrutia is a 58 y.o. male as a transfer from [facility] who presents for evaluation of post op complications. He initially presented to [facility] for flu like symptoms that included fever, nausea, and vomiting. He determined to be in septic shock at that time. Tomás states he has also been experiencing lower back pain, and recently had a laminectomy with hardware fusion performed by Dr. Sanders on 9/20/18. He reports decreased urination tonight as well, and has not urinated for several hours. Tomás received Vancomycin, Unasyn, and 2 LNS bolus at [facility] before transfer. He continued to have hypotension after 2L bolus. Central line placed for possible need of pressors.  He denies any sensory changes at this time. No alleviating or exacerbating factors are identified at this time.     REVIEW OF SYSTEMS  Review of Systems   Constitutional: Positive for fever.   HENT: Negative for congestion and sore throat.    Respiratory: Negative for cough.    Cardiovascular: Negative for chest pain.   Gastrointestinal: Positive for nausea and vomiting.   Genitourinary: Negative for dysuria.   Musculoskeletal: Positive for back pain (lower).   Skin:        Wound drainage and redness   Neurological: Negative for sensory change and headaches.   Psychiatric/Behavioral: Negative for substance abuse.   All other systems reviewed and are  "negative.    See HPI for further details.    PAST MEDICAL HISTORY   has a past medical history of Back pain and Numbness and tingling of left lower extremity.    SOCIAL HISTORY  Social History     Social History Main Topics   • Smoking status: Never Smoker   • Smokeless tobacco: Never Used   • Alcohol use Yes      Comment: 3 per week   • Drug use: No   • Sexual activity: None noted       SURGICAL HISTORY   has a past surgical history that includes other orthopedic surgery (1974/2000); lumbar fusion posterior (9/20/2018); and lumbar decompression (9/20/2018).    CURRENT MEDICATIONS  Home Medications    **Home medications have not yet been reviewed for this encounter**         ALLERGIES  No Known Allergies    PHYSICAL EXAM  VITAL SIGNS: /82   Pulse (!) 42   Temp 37.5 °C (99.5 °F)   Resp (!) 42   Ht 1.803 m (5' 11\")   Wt 72.6 kg (160 lb)   SpO2 90%   BMI 22.32 kg/m²     Pulse ox interpretation: I interpret this pulse ox as abnormal.  Constitutional: ill appearing 58 year old man laying supine in moderate distress, rigors  HENT: No signs of trauma, Bilateral external ears normal, Nose normal. Dry mucous membranes.  Eyes: Pupils are equal, Conjunctiva normal, Non-icteric.   Neck: Normal range of motion, No tenderness, Supple, No stridor. no JVD  Cardiovascular: elevated rate and regular rhythm, no murmurs. cold distally but with palpable DP and PT pulses. No cyanosis of extremities. No peripheral edema of extremities.  Thorax & Lungs: Increased respiratory rate, No wheezing, No chest tenderness.   Abdomen: Bowel sounds normal, Soft, No tenderness, No masses, No pulsatile masses. No peritoneal signs.  : Gilbert catheter placed. No urine output   Skin: Warm, No rash. Refer to back exam.   Back: Dressing taken down. Lumbar midline surgical wound with mild erythema surrounding the wound and very tender to palpation. Purulent drainage from inferior aspect of wound.  Musculoskeletal: Good range of motion in all " major joints.  Neurologic: Tremulous, Alert , Normal motor function No focal deficits noted. 5/5 strength in lower extremities. Sensation intact. No perineal anesthesia.   Psychiatric: Affect normal, Judgment normal, Mood normal.    DIAGNOSTIC STUDIES / PROCEDURES    EKG  Interpreted by me    Rhythm:  Sinus Tachycardia.    Rate: 109  No ST elevation or depression.  Normal intervals  Significant artifact (patient shaking)  Compared to prior EKG on 9/14/2018, besides tachycardia, no impressive changes.   Clinical Impression: Sinus Tachycardia.     LABS  Pertinent Labs & Imaging studies reviewed. (See chart for details)    RADIOLOGY  DX-CHEST-PORTABLE (1 VIEW)   Final Result         1.  No acute cardiopulmonary disease.        Pertinent Labs & Imaging studies reviewed. (See chart for details)    COURSE & MEDICAL DECISION MAKING  Pertinent Labs & Imaging studies reviewed. (See chart for details)    3:51 AM This is a 58 y.o. male who presents with fever and lower back pain following recent laminectomy. The differential diagnosis includes but is not limited to surgical site infection, septic shock, renal failure, electrolyte abnormalities. Ordered DX-Chest, CBC with differential, CMP, Lactic Acid, Blood Culture x 2, Urine Culture, Urinalysis, Fluid culture with gram stain, and Procalcitonin. Patient will be treated with Fentanyl 100 mcg for his pain. He will additionally receive NS fluid bolus for tachycardia and sepsis. Placing jeffries catheter, He has not urinated in several hours. Also for obtaining consistent core temperature.    3:52 AM - Obtained and reviewed past medical records which indicate patient had lab work done at [facility] showing WBC of 7.5, Creatinine of 1.12, Bicarb of 20, CRP of 28, and Lactic Acid of 1.3. CT-lumbar spine shows L3-L5 laminectomy. Minimal subcutaneous air along incision tract. Limited exam due to artifact. He was given 30 cc per kg bolus. Patient additionally received Vancomycin and  Unasyn. He currently has a central line in his right IJ. Patient's laminectomy was performed Dr. Sanders on 9/20/18.     4:32 AM - Consult with Dr. Manzano, Spinal Surgeon, who will see the patient in the morning. Will admit to the ICU.    4:37 AM - Paged ISAIAS Burroughs.     4:40 AM - Spoke with Dr. Manzano, Spinal Surgeon, who states he will take the patient to the OR.     4:45 AM - Consult with Dr. Saunders, ISAIAS Burroughs, who agrees to admit the patient to ICU  (he will first go to OR for debridement and washout.)    4:45 AM - Patient reevaluated at bedside. He is resting in bed. Gilbert catheter with normal appearing urine with over 700 cc's output. After 500 cc's fluid bolus, pressure is 120 with heart rate still in the 1teens. Continues to have fever. WBC 8.7. Lactic acid is 1.8. CXR unremarkable.     DISPOSITION:  Patient will be admitted to Dr. Sanders, Spinal Surgeon, in guarded condition.    FINAL IMPRESSION  1. Sepsis, due to unspecified organism (HCC)    2. Wound infection after surgery          Bharath PATE (Kinza), am scribing for, and in the presence of, Gregorio Wei II, M.D.    Electronically signed by: Bharath Bennett (Kinza), 10/10/2018    Gregorio PATE II, M.D. personally performed the services described in this documentation, as scribed by Bharath Bennett in my presence, and it is both accurate and complete.    C.    The note accurately reflects work and decisions made by me.  Gregorio Wei II  10/10/2018  6:10 AM

## 2018-10-10 NOTE — CARE PLAN
Problem: Knowledge Deficit  Goal: Knowledge of disease process/condition, treatment plan, diagnostic tests, and medications will improve  POC discussed with patient, all questions answered, medication education completed, pt educated on disease process.    Problem: Pain Management  Goal: Pain level will decrease to patient's comfort goal  Pt educated on 0/10 pain scale and to notify RN for pain

## 2018-10-10 NOTE — ASSESSMENT & PLAN NOTE
Mid line lumbar spine surgical site erythema, edema and purulent secretion  S/p I&D and wound closure by Dr. Manzano (10/10)  Blood Cx: Proteus   Wound Cx: Proteus- pansensitive  Changed zosyn to IV ampicillin 10/13  Orthopedic surgery (Dr. Sanders) is following- no plan for 2nd I&D as pt clinically improving   Drain removed and ok for discharge per surgery  Daily dressing changes, wound consulted- no open wound noted  PT on board- patient walking all over the hallway without any support

## 2018-10-10 NOTE — PROGRESS NOTES
"Pharmacy Kinetics 58 y.o. male on vancomycin day # 1  10/10/2018    Currently on Vancomycin 1800 mg iv x1 partial loading dose   (1030)                                   + 1g x1 10/9 @ 2330 (At [facility])    Indication for Treatment: SSTI    Pertinent history per medical record: Admitted on 10/10/2018 as a transfer from [facility] for septic shock with hypotension. Erythematous mid back lumbar surgical site that was expressing purelent drainage. CT-lumbar spine shows L3-L5 laminectomy. Minimal subcutaneous air. S/P L3-L5 transforaminal lumbar interbody instrumented fusion and L2-S1 decompressive laminectomy with foraminotomies on 18. Presented at [facility] with a 3-day history of worsening flulike symptoms, ABD pain, N/V, and worsening excruciating lumbar back pain. Received Vancomycin 1g IV on 10/9 at ~2330. I&D performed 10/10/18.     Other antibiotics: ampicillin/sulbactam 3g IV q6h     Allergies: Patient has no known allergies.     List concerns for renal function: none    Pertinent cultures to date:   10/10/18: Wound culture - in process  10/10/18: Blood x2 - in process    Recent Labs      10/10/18   0355   WBC  8.7   NEUTSPOLYS  80.60*     Recent Labs      10/10/18   0355   BUN  13   CREATININE  0.83   ALBUMIN  3.1*     No results for input(s): VANCOTROUGH, VANCOPEAK, VANCORANDOM in the last 72 hours.  Intake/Output Summary (Last 24 hours) at 10/10/18 1139  Last data filed at 10/10/18 1103   Gross per 24 hour   Intake          2219.17 ml   Output             2150 ml   Net            69.17 ml      Blood pressure 119/82, pulse (!) 108, temperature (!) 38.1 °C (100.6 °F), resp. rate 16, height 1.803 m (5' 11\"), weight 75.6 kg (166 lb 10.7 oz), SpO2 100 %. Temp (24hrs), Av.2 °C (100.7 °F), Min:37.5 °C (99.5 °F), Max:38.8 °C (101.8 °F)      A/P   1. Vancomycin dose change: 1300mg IV q12h   (0700, 1900)  2. Next vancomycin level:  10/12/18 at 0630    (ordered)  3. Goal trough: 12-16 " mcg/mL  4. Comments: New start vancomycin for SSTI s/p spinal surgery. No leukocytosis but reported fevers and hypotension. I&D performed this morning and cultures are in process. Partial loading dose was given at previous hospital and an additional partial loading dose given here. No concerns for renal function at this time. Start ~17mg/kg per protocol ~ 8hrs from last bolus dose. Trough level ordered for prior to 5th dose to assess accumulation and dose appropriateness. Pharmacy to monitor cultures for possible de-escalation.      Ngozi Anderson, PharmD., BCPS

## 2018-10-10 NOTE — PROGRESS NOTES
[facility] called with critical lab of blood culture.  Preliminary results of Gram Negative bacilli.  [facility] gold paged to update with results

## 2018-10-10 NOTE — OR NURSING
0735- ett removed and pt suctioned for small amt sputum from oral cavity. Pt lethargic but able to follow verbal commands. Pt tachycardic which is preexisting. Pt denies pain at this time. o2 via nasal cannula. Large mdl drsg to back c,d,i.

## 2018-10-10 NOTE — THERAPY
58-year-old male with a past medical history of spondylolisthesis, lumbar spinal stenosis, lumbar radiculopathy, lumbar degenerative disk disease S/P L3-L5 transforaminal lumbar interbody instrumented fusion and L2-S1 decompressive laminectomy with foraminotomies on 9/20/18 was transferred from [facility] where he presented with a 3-day history of worsening flulike symptoms, ABD pain, N/V, and worsening excruciating lumbar back pain. He reports F/C and night sweats. At [facility] he was noted for septic shock as he was presenting with hypotension, fever, tachypnea and tachycardia. He was noted for Erythematous mid back lumbar surgical site that was expressing purelent drainage. CT-lumbar spine shows L3-L5 laminectomy.

## 2018-10-10 NOTE — CONSULTS
DATE OF SERVICE:  10/10/2018    IDENTIFICATION: The patient is a 58-year-old right-hand dominant male with   chief complaint of low back pain, status post lumbar spine surgery on   09/20/2018.    HISTORY OF PRESENT ILLNESS:  The patient reports he has had surgery on his   lumbar spine on 09/20/2018.  He went to his followups PA last week.  Noted to   have some drainage.  He reports it has been draining the whole time.  He did   state that it was closed with glue.  He did not have any staples placed.  Due   to the drainage, he was re-glued.    He then states that the drainage stopped for a few days, but then progressed.    He has been taking his temperature and was afebrile until yesterday.    Over the weekend, the fluid got so bad that it started draining.    He had a friend that was doing dressing changes for him as instructed by the   PA.    Yesterday, the drainage significantly increased.  He had a temperature of   104.1.    His neighbor subsequently called the paramedics.  He was taken to [facility].    At [facility], he was evaluated.  Given vancomycin and Unasyn.    He was noted to be hypotensive, tachycardic and felt to be septic.  He was   emergently referred to [facility].    Upon arriving at [facility], he is quite agitated with shakes and   fevers.  I was called this morning at 0430 by the emergency room physician.    It was felt that he was septic.  It was recommended that he be admitted to the   ICU and emergently taken to the operating room.    PAST MEDICAL HISTORY:  He has had back pain for years.  He has only had one   back surgery as noted above.    He has had surgery on his left hand index and thumb in 2000.    MEDICAL PROBLEMS:  He denies any medical problems.    MEDICATIONS:  He has only been taking pain medications since his surgery.  He   denies any chronic pain use or other medication use.    ALLERGIES:  He has no known drug allergies.    FAMILY HISTORY:   Family history is negative for any high blood pressure,   heart, liver, lung, kidney disease, diabetes or problems with anesthetics.    SOCIAL HISTORY:  He currently lives in [place].  He lives alone.  He   does have a neighbor that helps with him.  He denies any tobacco use.  He will   have 3 beers a week.  Denies any IV drug use or substance abuse.    REVIEW OF SYSTEMS:  He does report having fevers, shakes and chills.  He has   had constipation, but has had frequent bowel movements since his surgery.  He   has had some regurgitation, but he has not had any vomiting.  Denies any chest   pain, shortness of breath, vomiting, diarrhea, joint pain except for his   back, rashes or skin changes.    PHYSICAL EXAMINATION:  GENERAL:  The patient is a well-nourished, well-developed male.  He is in a   moderate amount of distress, somewhat agitated, but has gotten better just on   examining him and talking to him.  VITAL SIGNS:  His blood pressure is 119/82, pulse 42, temperature 99.5,   respirations 42.  Height 5 feet 11 inches, weighs 160 pounds.  HEENT:  Extraocular muscles are intact.  Gums and buccal mucosa are dry, but   no evidence of any injury.  NECK:  He has no neck tenderness.  No chest tenderness.  HEART:  Has a regular rate and rhythm.  No murmurs, rubs, or gallops.  LUNGS:  Clear to auscultation.  ABDOMEN:  Soft, nontender.  Negative rebound tenderness.  EXTREMITIES:  His back shows a longitudinal incision from the mid thoracic   region down to the lower lumbar region.  There is maximal pus-like drainage   from the inferior portion.  Expressing the superior portion, there is   fluctuance with drainage from this area.  Maximal tenderness is anteriorly,   but he also has tenderness superiorly.    He does able to move his toes, knees and ankles.  There is no evidence of any   weakness.  He does report having some generalized numbness in his hands, but   there is no dermatomal loss.  No significant loss.  He  does able to   differentiate sharp from dull.  He has good  strengths in his hands.    There is no calf tenderness.  No groin tenderness.  No tenderness over his   adductor hiatus.    NEUROLOGIC:  He has normal sensation distally.    LABORATORY DATA:  His white count is 8.7, hematocrit is 29.3, hemoglobin is   10.4.  He has a significant neutrophil shifts with 7.03 bands.  80   neutrophils.  Sodium 131, potassium 4.3, chloride 98, CO2 21, anion gap 12,   glucose 104, BUN 13, creatinine 0.83.  ____ noted on 09/23, he had a white   count of 11.7.  He had a left shift on 09/14, but this has increased.    I was told his sed rate was elevated, but I am not finding that on a computer.    CT SCAN:  CT report demonstrates subcutaneous bubbles.  No significant fluid   collection around the spine.    IMPRESSION:  Sepsis with underlying wound infection.    I have gone over the risks, benefits, and options with him.  I would   definitely recommend irrigation and debridement.  I concur with admitting to   the ICU.  He has already had antibiotics, but I hold antibiotics until the   cultures can be obtained.    Again, the nursing staff does state that he has markedly improved and he   improved since just seen him today.  I will try to contact Dr. Sanders regarding   ongoing treatment.  He understands these risks, benefits and options.  He   does agree with this course of action and wishes to proceed.  He understands   the risk of infection, bleeding, nerve/tendon damage, death from anesthesia.    Closing this wound over drains.       ____________________________________     MD MAGDALENE JULIEN / [facility]ST    DD:  10/10/2018 05:45:17  DT:  10/10/2018 06:29:20    D#:  [id number]  Job#:  [id number]

## 2018-10-10 NOTE — ASSESSMENT & PLAN NOTE
This is sepsis (without associated acute organ dysfunction).   SIRS 3/4 (HR, RR and Temp)  qSOFA 2/3 (RR and SBP)  S/p fluid resuscitation and empiric antibiotic therapy on admission   CT scan at OSH showed epidural abscess s/p I&D on admission by Dr. Manzano (10/10)  Blood Cx at OSH grew GNR   Blood Cx: proteus  Wound Cx: Proteus - pansensitive  Plan:  - Changed abx from zosyn to IV ampicillin 10/13  - Transitioned to po amoxicillin before discharge for 2 weeks  - Follow up with surgery after discharge- on Wednesday  - PRN roxicodone for pain

## 2018-10-11 PROBLEM — E87.6 HYPOKALEMIA: Status: ACTIVE | Noted: 2018-10-11

## 2018-10-11 PROBLEM — R68.89 RIGORS: Status: RESOLVED | Noted: 2018-10-10 | Resolved: 2018-10-11

## 2018-10-11 PROBLEM — E83.42 HYPOMAGNESEMIA: Status: ACTIVE | Noted: 2018-10-11

## 2018-10-11 LAB
ANION GAP SERPL CALC-SCNC: 7 MMOL/L (ref 0–11.9)
BASOPHILS # BLD AUTO: 0.4 % (ref 0–1.8)
BASOPHILS # BLD: 0.03 K/UL (ref 0–0.12)
BUN SERPL-MCNC: 7 MG/DL (ref 8–22)
CALCIUM SERPL-MCNC: 7.2 MG/DL (ref 8.5–10.5)
CHLORIDE SERPL-SCNC: 97 MMOL/L (ref 96–112)
CO2 SERPL-SCNC: 24 MMOL/L (ref 20–33)
CREAT SERPL-MCNC: 0.62 MG/DL (ref 0.5–1.4)
EOSINOPHIL # BLD AUTO: 0.07 K/UL (ref 0–0.51)
EOSINOPHIL NFR BLD: 1 % (ref 0–6.9)
ERYTHROCYTE [DISTWIDTH] IN BLOOD BY AUTOMATED COUNT: 44.1 FL (ref 35.9–50)
GLUCOSE SERPL-MCNC: 95 MG/DL (ref 65–99)
HCT VFR BLD AUTO: 26 % (ref 42–52)
HGB BLD-MCNC: 9 G/DL (ref 14–18)
HGB RETIC QN AUTO: 23.7 PG/CELL (ref 29–35)
IMM GRANULOCYTES # BLD AUTO: 0.03 K/UL (ref 0–0.11)
IMM GRANULOCYTES NFR BLD AUTO: 0.4 % (ref 0–0.9)
IMM RETICS NFR: 13 % (ref 9.3–17.4)
LYMPHOCYTES # BLD AUTO: 0.47 K/UL (ref 1–4.8)
LYMPHOCYTES NFR BLD: 6.7 % (ref 22–41)
MAGNESIUM SERPL-MCNC: 1.7 MG/DL (ref 1.5–2.5)
MCH RBC QN AUTO: 32.7 PG (ref 27–33)
MCHC RBC AUTO-ENTMCNC: 34.6 G/DL (ref 33.7–35.3)
MCV RBC AUTO: 94.5 FL (ref 81.4–97.8)
MONOCYTES # BLD AUTO: 1.01 K/UL (ref 0–0.85)
MONOCYTES NFR BLD AUTO: 14.4 % (ref 0–13.4)
NEUTROPHILS # BLD AUTO: 5.39 K/UL (ref 1.82–7.42)
NEUTROPHILS NFR BLD: 77.1 % (ref 44–72)
NRBC # BLD AUTO: 0 K/UL
NRBC BLD-RTO: 0 /100 WBC
PHOSPHATE SERPL-MCNC: 2.8 MG/DL (ref 2.5–4.5)
PLATELET # BLD AUTO: 214 K/UL (ref 164–446)
PMV BLD AUTO: 9.9 FL (ref 9–12.9)
POTASSIUM SERPL-SCNC: 3.4 MMOL/L (ref 3.6–5.5)
RBC # BLD AUTO: 2.75 M/UL (ref 4.7–6.1)
RETICS # AUTO: 0.03 M/UL (ref 0.04–0.06)
RETICS/RBC NFR: 1 % (ref 0.8–2.1)
SODIUM SERPL-SCNC: 128 MMOL/L (ref 135–145)
WBC # BLD AUTO: 7 K/UL (ref 4.8–10.8)

## 2018-10-11 PROCEDURE — 700102 HCHG RX REV CODE 250 W/ 637 OVERRIDE(OP): Performed by: STUDENT IN AN ORGANIZED HEALTH CARE EDUCATION/TRAINING PROGRAM

## 2018-10-11 PROCEDURE — 700111 HCHG RX REV CODE 636 W/ 250 OVERRIDE (IP): Performed by: INTERNAL MEDICINE

## 2018-10-11 PROCEDURE — 85025 COMPLETE CBC W/AUTO DIFF WBC: CPT

## 2018-10-11 PROCEDURE — 84100 ASSAY OF PHOSPHORUS: CPT

## 2018-10-11 PROCEDURE — 99291 CRITICAL CARE FIRST HOUR: CPT | Performed by: INTERNAL MEDICINE

## 2018-10-11 PROCEDURE — A9270 NON-COVERED ITEM OR SERVICE: HCPCS | Performed by: INTERNAL MEDICINE

## 2018-10-11 PROCEDURE — 700105 HCHG RX REV CODE 258

## 2018-10-11 PROCEDURE — G8979 MOBILITY GOAL STATUS: HCPCS | Mod: CI

## 2018-10-11 PROCEDURE — 770022 HCHG ROOM/CARE - ICU (200)

## 2018-10-11 PROCEDURE — 700105 HCHG RX REV CODE 258: Performed by: INTERNAL MEDICINE

## 2018-10-11 PROCEDURE — A9270 NON-COVERED ITEM OR SERVICE: HCPCS | Performed by: STUDENT IN AN ORGANIZED HEALTH CARE EDUCATION/TRAINING PROGRAM

## 2018-10-11 PROCEDURE — G8978 MOBILITY CURRENT STATUS: HCPCS | Mod: CL

## 2018-10-11 PROCEDURE — 97162 PT EVAL MOD COMPLEX 30 MIN: CPT

## 2018-10-11 PROCEDURE — 80048 BASIC METABOLIC PNL TOTAL CA: CPT

## 2018-10-11 PROCEDURE — 87040 BLOOD CULTURE FOR BACTERIA: CPT

## 2018-10-11 PROCEDURE — 700102 HCHG RX REV CODE 250 W/ 637 OVERRIDE(OP): Performed by: INTERNAL MEDICINE

## 2018-10-11 PROCEDURE — 85046 RETICYTE/HGB CONCENTRATE: CPT

## 2018-10-11 PROCEDURE — 87077 CULTURE AEROBIC IDENTIFY: CPT

## 2018-10-11 PROCEDURE — 83735 ASSAY OF MAGNESIUM: CPT

## 2018-10-11 RX ORDER — MAGNESIUM SULFATE 1 G/100ML
1 INJECTION INTRAVENOUS ONCE
Status: DISCONTINUED | OUTPATIENT
Start: 2018-10-11 | End: 2018-10-11

## 2018-10-11 RX ORDER — SODIUM CHLORIDE 9 MG/ML
INJECTION, SOLUTION INTRAVENOUS CONTINUOUS
Status: DISCONTINUED | OUTPATIENT
Start: 2018-10-11 | End: 2018-10-11

## 2018-10-11 RX ORDER — SODIUM CHLORIDE 9 MG/ML
INJECTION, SOLUTION INTRAVENOUS
Status: COMPLETED
Start: 2018-10-11 | End: 2018-10-11

## 2018-10-11 RX ORDER — MAGNESIUM SULFATE HEPTAHYDRATE 40 MG/ML
2 INJECTION, SOLUTION INTRAVENOUS ONCE
Status: COMPLETED | OUTPATIENT
Start: 2018-10-11 | End: 2018-10-11

## 2018-10-11 RX ORDER — SODIUM CHLORIDE, SODIUM GLUCONATE, SODIUM ACETATE, POTASSIUM CHLORIDE AND MAGNESIUM CHLORIDE 526; 502; 368; 37; 30 MG/100ML; MG/100ML; MG/100ML; MG/100ML; MG/100ML
INJECTION, SOLUTION INTRAVENOUS CONTINUOUS
Status: DISCONTINUED | OUTPATIENT
Start: 2018-10-11 | End: 2018-10-12

## 2018-10-11 RX ORDER — POTASSIUM CHLORIDE 20 MEQ/1
40 TABLET, EXTENDED RELEASE ORAL ONCE
Status: COMPLETED | OUTPATIENT
Start: 2018-10-11 | End: 2018-10-11

## 2018-10-11 RX ORDER — OXYCODONE HYDROCHLORIDE 5 MG/1
5 TABLET ORAL EVERY 4 HOURS PRN
Status: DISCONTINUED | OUTPATIENT
Start: 2018-10-11 | End: 2018-10-12

## 2018-10-11 RX ADMIN — HYDROMORPHONE HYDROCHLORIDE 1 MG: 2 INJECTION INTRAMUSCULAR; INTRAVENOUS; SUBCUTANEOUS at 19:51

## 2018-10-11 RX ADMIN — VANCOMYCIN HYDROCHLORIDE 1300 MG: 100 INJECTION, POWDER, LYOPHILIZED, FOR SOLUTION INTRAVENOUS at 04:37

## 2018-10-11 RX ADMIN — HYDROMORPHONE HYDROCHLORIDE 1 MG: 2 INJECTION INTRAMUSCULAR; INTRAVENOUS; SUBCUTANEOUS at 00:48

## 2018-10-11 RX ADMIN — PIPERACILLIN AND TAZOBACTAM 3.38 G: 3; .375 INJECTION, POWDER, LYOPHILIZED, FOR SOLUTION INTRAVENOUS; PARENTERAL at 13:33

## 2018-10-11 RX ADMIN — HYDROMORPHONE HYDROCHLORIDE 1 MG: 2 INJECTION INTRAMUSCULAR; INTRAVENOUS; SUBCUTANEOUS at 23:34

## 2018-10-11 RX ADMIN — POTASSIUM CHLORIDE 40 MEQ: 1500 TABLET, EXTENDED RELEASE ORAL at 08:17

## 2018-10-11 RX ADMIN — SODIUM CHLORIDE 1000 ML: 9 INJECTION, SOLUTION INTRAVENOUS at 07:37

## 2018-10-11 RX ADMIN — PIPERACILLIN AND TAZOBACTAM 3.38 G: 3; .375 INJECTION, POWDER, LYOPHILIZED, FOR SOLUTION INTRAVENOUS; PARENTERAL at 20:47

## 2018-10-11 RX ADMIN — HYDROMORPHONE HYDROCHLORIDE 1 MG: 2 INJECTION INTRAMUSCULAR; INTRAVENOUS; SUBCUTANEOUS at 13:45

## 2018-10-11 RX ADMIN — HYDROMORPHONE HYDROCHLORIDE 1 MG: 2 INJECTION INTRAMUSCULAR; INTRAVENOUS; SUBCUTANEOUS at 18:27

## 2018-10-11 RX ADMIN — HYDROMORPHONE HYDROCHLORIDE 1 MG: 2 INJECTION INTRAMUSCULAR; INTRAVENOUS; SUBCUTANEOUS at 08:17

## 2018-10-11 RX ADMIN — OXYCODONE HYDROCHLORIDE 5 MG: 5 TABLET ORAL at 16:38

## 2018-10-11 RX ADMIN — SODIUM CHLORIDE, SODIUM GLUCONATE, SODIUM ACETATE, POTASSIUM CHLORIDE AND MAGNESIUM CHLORIDE: 526; 502; 368; 37; 30 INJECTION, SOLUTION INTRAVENOUS at 10:37

## 2018-10-11 RX ADMIN — HYDROMORPHONE HYDROCHLORIDE 1 MG: 2 INJECTION INTRAMUSCULAR; INTRAVENOUS; SUBCUTANEOUS at 11:01

## 2018-10-11 RX ADMIN — SODIUM CHLORIDE, SODIUM GLUCONATE, SODIUM ACETATE, POTASSIUM CHLORIDE AND MAGNESIUM CHLORIDE: 526; 502; 368; 37; 30 INJECTION, SOLUTION INTRAVENOUS at 19:59

## 2018-10-11 RX ADMIN — SENNOSIDES AND DOCUSATE SODIUM 2 TABLET: 8.6; 5 TABLET ORAL at 05:54

## 2018-10-11 RX ADMIN — ACETAMINOPHEN 650 MG: 325 TABLET, FILM COATED ORAL at 02:07

## 2018-10-11 RX ADMIN — MAGNESIUM SULFATE IN WATER 2 G: 40 INJECTION, SOLUTION INTRAVENOUS at 08:17

## 2018-10-11 RX ADMIN — ACETAMINOPHEN 650 MG: 325 TABLET, FILM COATED ORAL at 13:33

## 2018-10-11 RX ADMIN — HYDROMORPHONE HYDROCHLORIDE 1 MG: 2 INJECTION INTRAMUSCULAR; INTRAVENOUS; SUBCUTANEOUS at 02:14

## 2018-10-11 RX ADMIN — HYDROMORPHONE HYDROCHLORIDE 1 MG: 2 INJECTION INTRAMUSCULAR; INTRAVENOUS; SUBCUTANEOUS at 06:00

## 2018-10-11 RX ADMIN — PIPERACILLIN AND TAZOBACTAM 3.38 G: 3; .375 INJECTION, POWDER, LYOPHILIZED, FOR SOLUTION INTRAVENOUS; PARENTERAL at 04:43

## 2018-10-11 ASSESSMENT — GAIT ASSESSMENTS
GAIT LEVEL OF ASSIST: MINIMAL ASSIST
DEVIATION: BRADYKINETIC
ASSISTIVE DEVICE: WHEELCHAIR PUSH
DISTANCE (FEET): 250

## 2018-10-11 ASSESSMENT — COGNITIVE AND FUNCTIONAL STATUS - GENERAL
TURNING FROM BACK TO SIDE WHILE IN FLAT BAD: A LITTLE
MOVING FROM LYING ON BACK TO SITTING ON SIDE OF FLAT BED: A LITTLE
MOVING TO AND FROM BED TO CHAIR: UNABLE
SUGGESTED CMS G CODE MODIFIER MOBILITY: CK
EATING MEALS: A LITTLE
MOBILITY SCORE: 17
TOILETING: A LITTLE
WALKING IN HOSPITAL ROOM: A LITTLE
TURNING FROM BACK TO SIDE WHILE IN FLAT BAD: UNABLE
STANDING UP FROM CHAIR USING ARMS: A LITTLE
MOVING TO AND FROM BED TO CHAIR: A LITTLE
SUGGESTED CMS G CODE MODIFIER MOBILITY: CL
CLIMB 3 TO 5 STEPS WITH RAILING: A LOT
DRESSING REGULAR UPPER BODY CLOTHING: A LOT
HELP NEEDED FOR BATHING: A LITTLE
PERSONAL GROOMING: A LITTLE
WALKING IN HOSPITAL ROOM: A LITTLE
DAILY ACTIVITIY SCORE: 17
DRESSING REGULAR LOWER BODY CLOTHING: A LITTLE
CLIMB 3 TO 5 STEPS WITH RAILING: A LOT
MOVING FROM LYING ON BACK TO SITTING ON SIDE OF FLAT BED: A LITTLE
SUGGESTED CMS G CODE MODIFIER DAILY ACTIVITY: CK
STANDING UP FROM CHAIR USING ARMS: A LITTLE
MOBILITY SCORE: 13

## 2018-10-11 ASSESSMENT — PAIN SCALES - GENERAL
PAINLEVEL_OUTOF10: 3
PAINLEVEL_OUTOF10: 5
PAINLEVEL_OUTOF10: 7
PAINLEVEL_OUTOF10: 7
PAINLEVEL_OUTOF10: 4
PAINLEVEL_OUTOF10: 6
PAINLEVEL_OUTOF10: 7
PAINLEVEL_OUTOF10: 3
PAINLEVEL_OUTOF10: 7
PAINLEVEL_OUTOF10: 3
PAINLEVEL_OUTOF10: 6
PAINLEVEL_OUTOF10: 6
PAINLEVEL_OUTOF10: 3
PAINLEVEL_OUTOF10: 5
PAINLEVEL_OUTOF10: 7
PAINLEVEL_OUTOF10: 6
PAINLEVEL_OUTOF10: 6

## 2018-10-11 ASSESSMENT — ENCOUNTER SYMPTOMS
CONSTIPATION: 1
HEMATOLOGIC/LYMPHATIC NEGATIVE: 1
FATIGUE: 1
CHILLS: 1
BACK PAIN: 1
RESPIRATORY NEGATIVE: 1
DIZZINESS: 0
ENDOCRINE NEGATIVE: 1
FEVER: 1
DIAPHORESIS: 1
ALLERGIC/IMMUNOLOGIC NEGATIVE: 1
VOMITING: 0
COUGH: 0
SHORTNESS OF BREATH: 0
EYES NEGATIVE: 1
NAUSEA: 0

## 2018-10-11 ASSESSMENT — LIFESTYLE VARIABLES
HAVE PEOPLE ANNOYED YOU BY CRITICIZING YOUR DRINKING: NO
AVERAGE NUMBER OF DAYS PER WEEK YOU HAVE A DRINK CONTAINING ALCOHOL: 2
EVER FELT BAD OR GUILTY ABOUT YOUR DRINKING: NO
HAVE YOU EVER FELT YOU SHOULD CUT DOWN ON YOUR DRINKING: NO
TOTAL SCORE: 0
ON A TYPICAL DAY WHEN YOU DRINK ALCOHOL HOW MANY DRINKS DO YOU HAVE: 1
HOW MANY TIMES IN THE PAST YEAR HAVE YOU HAD 5 OR MORE DRINKS IN A DAY: 0
CONSUMPTION TOTAL: NEGATIVE
EVER HAD A DRINK FIRST THING IN THE MORNING TO STEADY YOUR NERVES TO GET RID OF A HANGOVER: NO
TOTAL SCORE: 0
TOTAL SCORE: 0
ALCOHOL_USE: YES

## 2018-10-11 NOTE — PROGRESS NOTES
12 Hour Chart Check    MS: .12/.08/.30  Sinus Rhythm to Sinus Tachycardia 90s to 120s.    2 RN Skin Check

## 2018-10-11 NOTE — CONSULTS
DATE OF SERVICE:  10/10/2018    SERVICE:  Orthopedic spine surgery.    CONSULTANT:  Romain Sanders MD    HISTORY OF PRESENT ILLNESS:  The patient is a 58-year-old gentleman who is   well known to me on whom, which I have informed the lumbar decompression and   fusion, multilevel on 09/20/2018.  Postoperatively, he was seen in the clinic   last week with a very slight amount of serous drainage.  Yesterday, he started   to develop fevers and nausea and he was taken via ambulance to an outside   hospital.  He was noted at that point to be hypertensive, tachycardic, and   septic.  His peripheral blood cultures have grown out gram-negative rods and   Dr. Manzano who is on-call for me emergently took him to the operating room and   did an irrigation and debridement and drain placement.    PAST MEDICAL HISTORY:  Significant for years of back pain with chronic pain   treatment.    PAST SURGICAL HISTORY:  Earlier today, he had an irrigation and debridement of   the lumbar spine.  He also has a history of, on 09/20/2018, multilevel   decompression and fusion of his lumbar spine.  Other past medical history, he   has no heart disease, lung disease, seizures, ulcers, diabetes, hypertension,   or cancer.    FAMILY HISTORY:  Not positive for any issues regarding this hospitalization of   the surgery.    SOCIAL HISTORY:  He lives alone.  He lives in [place].  He does not use   tobacco.  Alcohol use, very minimal, 3 drinks a week.  Does not have a   history of IV drug abuse or substance abuse.    REVIEW OF SYSTEMS:  Positive for the items mentioned in the past medical   history.  He also had fever, chills, and constipation.  He also has pain in   his bowels, which he reports is not uncommon after he has to take narcotic   pain medication for pain.    PHYSICAL EXAMINATION:  GENERAL:  He is alert, oriented, able to answer all questions.  HEENT:  Examination of his head, extraocular muscles are intact.  Pupils are   equally  round and reactive to light.  NECK:  Trachea is midline.  No masses, jugular venous distention, or   lymphadenopathy.  HEART:  He is tachycardic.  LUNGS:  Clear to auscultation.  ABDOMEN:  Soft and nontender.  BACK:  Examination of his back, he has a dressing in place, which I am not   going to move at this point in time, but we will check tomorrow.  Sensation in   bilateral lower extremities is intact in all dermatomes to light touch and   pinprick.  Motor strength is 5/5 in all muscle groups including iliopsoas,   quadriceps, hamstrings, anterior tibialis, gastroc soleus, extensor hallucis   longus, and peroneals.    CT scan showed a large fluid collection with subcutaneous gas, deep wound   infection.    PLAN:  Dr. Manzano washed him out earlier today.  We will do a second washout   within the next day or two if his physiological parameters do not improve.  If   infectious disease or the intensive care service requests irrigation and   debridement, I will certainly carry that out as soon as possible.  My direct   cell phone number is [phone].  We will continue to closely watch him   during this hospitalization.    The question also may arise whether the hardware has to be removed.  Given the   fact that this likely would be grossly unstable if we immediately took out   the hardware, I would like to give as much time as possible out of the   hardware before we have to remove it.  However, this may have to be removed as   per recommendations from the infectious disease at least at some point in   time.       ____________________________________     MD FANNY HUSTON / [facility]    DD:  10/10/2018 19:11:12  DT:  10/10/2018 22:20:56    D#:  [id number]  Job#:  [id number]

## 2018-10-11 NOTE — PROGRESS NOTES
Dr Estrada notified of , BP 79/40.  Current LR bolus infusing on pressure bag through central line with no response.  Ok to start Levophed per Dr Estrada

## 2018-10-11 NOTE — CONSULTS
Critical Care Consultation      Referring Physician  Nikhil Núñez M.D.    Reason for Consultation  Severe sepsis/ epidural abscess    History of Presenting Illness  58 y.o. male who presented 10/10/2018 with fever, chills, shakes transferred to [facility] with diagnosis epidural abscess    Code Status  Full Code     Last 24 hours  - intermittent pressor use last night  - several fluid boluses for low pressure  - fevers to almost 39  - gm negatives in blood and in drainage from wound in OR  - urine output OK  - not currently on pressors  - remains critically ill      Review of Systems  Review of Systems   Unable to perform ROS: Acuity of condition (Able to do ROS today (not yesterday))   Constitutional: Positive for chills, diaphoresis, fatigue and fever.   HENT: Negative.    Eyes: Negative.    Respiratory: Negative.    Cardiovascular: Positive for leg swelling.   Gastrointestinal: Positive for constipation.   Endocrine: Negative.    Genitourinary: Negative.    Skin: Negative.    Allergic/Immunologic: Negative.    Hematological: Negative.        Past Medical History   has a past medical history of Back pain and Numbness and tingling of left lower extremity.    Surgical History   has a past surgical history that includes other orthopedic surgery (1974/2000); lumbar fusion posterior (9/20/2018); lumbar decompression (9/20/2018); and irrigation & debridement general (10/10/2018).    Family History  family history includes Stroke in his father and paternal grandmother.    Social History   reports that he has never smoked. He has never used smokeless tobacco. He reports that he drinks alcohol. He reports that he does not use drugs.    Medications  Home Medications     Reviewed by Romain Mariano R.N. (Registered Nurse) on 10/11/18 at 0857  Med List Status: Complete   Medication Last Dose Status   acetaminophen (TYLENOL) 500 MG Tab 10/9/2018 Active   cyclobenzaprine (FLEXERIL) 10 MG Tab 10/9/2018 Active   diazePAM  (VALIUM) 5 MG Tab 10/8/2018 Active   oxycodone (OXY-IR) 15 MG immediate release tablet 10/9/2018 Active              Current Facility-Administered Medications   Medication Dose Route Frequency Provider Last Rate Last Dose   • NS infusion   Intravenous Continuous Joesph Romo M.D. 100 mL/hr at 10/11/18 0737 1,000 mL at 10/11/18 0737   • magnesium sulfate IVPB premix 2 g  2 g Intravenous Once Terry Estrada M.D. 25 mL/hr at 10/11/18 0817 2 g at 10/11/18 0817   • senna-docusate (PERICOLACE or SENOKOT S) 8.6-50 MG per tablet 2 Tab  2 Tab Oral BID Dayne Wolf M.D.   2 Tab at 10/11/18 0554    And   • polyethylene glycol/lytes (MIRALAX) PACKET 1 Packet  1 Packet Oral QDAY PRN Dayne Wolf M.D.        And   • magnesium hydroxide (MILK OF MAGNESIA) suspension 30 mL  30 mL Oral QDAY PRN Dayne Wolf M.D.        And   • bisacodyl (DULCOLAX) suppository 10 mg  10 mg Rectal QDAY PRN Dayne Wolf M.D.       • Respiratory Care per Protocol   Nebulization Continuous RT Dayne Wolf M.D.       • MD Alert...ICU Electrolyte Replacement per Pharmacy   Other pharmacy to dose Dayne Wolf M.D.       • MD Alert...Vancomycin per Pharmacy   Other pharmacy to dose Abilio Calvillo Jr., D.O.       • vancomycin 1,300 mg in  mL IVPB  17 mg/kg Intravenous Q12HR Nikhil Núñez M.D.   Stopped at 10/11/18 0637   • piperacillin-tazobactam (ZOSYN) 3.375 g in  mL IVPB  3.375 g Intravenous Q8HRS Terry Estrada M.D. 25 mL/hr at 10/11/18 0443 3.375 g at 10/11/18 0443   • acetaminophen (TYLENOL) tablet 650 mg  650 mg Oral Q4HRS PRN Aalnnah iY M.D.   650 mg at 10/11/18 0207   • norepinephrine (LEVOPHED) 8 mg in  mL Infusion  0-30 mcg/min Intravenous Continuous Alannah Yi M.D.   Stopped at 10/11/18 0434   • HYDROmorphone (DILAUDID) injection 1 mg  1 mg Intravenous Q HOUR PRN Abilio Calvillo Jr., D.O.   1 mg at 10/11/18 0817        Allergies  No Known Allergies    Vital Signs last 24 hours  Temp:  [37.1 °C (98.8 °F)-38.6 °C (101.5 °F)] 37.1 °C (98.8 °F)  Pulse:  [] 99  Resp:  [7-35] 24    Physical Exam  Physical Exam   Constitutional: He is oriented to person, place, and time. He appears well-developed.   HENT:   Head: Normocephalic and atraumatic.   Eyes: Pupils are equal, round, and reactive to light.   Neck: Normal range of motion. No JVD present. No thyromegaly present.   Cardiovascular: Normal rate and regular rhythm.    No murmur heard.  Tachycardic 110, hypotension to systolics 80 which I treated with fluid boluses   Pulmonary/Chest: Effort normal and breath sounds normal. No stridor.   Abdominal: Soft. Bowel sounds are normal.   Musculoskeletal: Normal range of motion.   Neurological: He is oriented to person, place, and time.   On initial exam post op sleepy unable to answer questions from anesthesia but on re-exam woke, alert, appropriate   Skin: Skin is warm and dry.   Psychiatric: He has a normal mood and affect. His behavior is normal.       Fluids    Intake/Output Summary (Last 24 hours) at 10/11/18 0926  Last data filed at 10/11/18 0817   Gross per 24 hour   Intake          6448.26 ml   Output             3170 ml   Net          3278.26 ml       Laboratory  Recent Results (from the past 48 hour(s))   EKG    Collection Time: 10/10/18  3:46 AM   Result Value Ref Range    Report       [facility] Emergency Dept.    Test Date:  2018-10-10  Pt Name:    TRACY GARCIA               Department: ER  MRN:        [id number]                      Room:       BL 15  Gender:     Male                         Technician: [id number]  :        1960                   Requested By:KALA DANIELS  Order #:    [id number]                    Reading MD:    Measurements  Intervals                                Axis  Rate:       109                          P:          0  MN:         91                            QRS:        57  QRSD:       98                           T:          14  QT:         356  QTc:        480    Interpretive Statements  SINUS TACHYCARDIA  CONSIDER ANTERIOR INFARCT  MINIMAL ST DEPRESSION, INFERIOR LEADS  BORDERLINE PROLONGED QT INTERVAL  Compared to ECG 09/14/2018 09:52:44  Myocardial infarct finding now present  Sinus bradycardia no longer present  Short ME interval no longer present  T-wave abnormality no longer present  ST (T wave)  deviation still present     CBC WITH DIFFERENTIAL    Collection Time: 10/10/18  3:55 AM   Result Value Ref Range    WBC 8.7 4.8 - 10.8 K/uL    RBC 3.13 (L) 4.70 - 6.10 M/uL    Hemoglobin 10.4 (L) 14.0 - 18.0 g/dL    Hematocrit 29.3 (L) 42.0 - 52.0 %    MCV 93.6 81.4 - 97.8 fL    MCH 33.2 (H) 27.0 - 33.0 pg    MCHC 35.5 (H) 33.7 - 35.3 g/dL    RDW 42.5 35.9 - 50.0 fL    Platelet Count 271 164 - 446 K/uL    MPV 10.3 9.0 - 12.9 fL    Neutrophils-Polys 80.60 (H) 44.00 - 72.00 %    Lymphocytes 7.50 (L) 22.00 - 41.00 %    Monocytes 11.20 0.00 - 13.40 %    Eosinophils 0.00 0.00 - 6.90 %    Basophils 0.20 0.00 - 1.80 %    Immature Granulocytes 0.50 0.00 - 0.90 %    Nucleated RBC 0.00 /100 WBC    Neutrophils (Absolute) 7.03 1.82 - 7.42 K/uL    Lymphs (Absolute) 0.65 (L) 1.00 - 4.80 K/uL    Monos (Absolute) 0.98 (H) 0.00 - 0.85 K/uL    Eos (Absolute) 0.00 0.00 - 0.51 K/uL    Baso (Absolute) 0.02 0.00 - 0.12 K/uL    Immature Granulocytes (abs) 0.04 0.00 - 0.11 K/uL    NRBC (Absolute) 0.00 K/uL   CMP    Collection Time: 10/10/18  3:55 AM   Result Value Ref Range    Sodium 131 (L) 135 - 145 mmol/L    Potassium 4.3 3.6 - 5.5 mmol/L    Chloride 98 96 - 112 mmol/L    Co2 21 20 - 33 mmol/L    Anion Gap 12.0 (H) 0.0 - 11.9    Glucose 104 (H) 65 - 99 mg/dL    Bun 13 8 - 22 mg/dL    Creatinine 0.83 0.50 - 1.40 mg/dL    Calcium 7.5 (L) 8.5 - 10.5 mg/dL    AST(SGOT) 24 12 - 45 U/L    ALT(SGPT) 18 2 - 50 U/L    Alkaline Phosphatase 41 30 - 99 U/L    Total Bilirubin 0.6 0.1 - 1.5 mg/dL     Albumin 3.1 (L) 3.2 - 4.9 g/dL    Total Protein 5.8 (L) 6.0 - 8.2 g/dL    Globulin 2.7 1.9 - 3.5 g/dL    A-G Ratio 1.1 g/dL   BLOOD CULTURE x2    Collection Time: 10/10/18  3:55 AM   Result Value Ref Range    Significant Indicator POS (POS)     Source BLD     Site Central Line White Port     Blood Culture (A)      Growth detected by Bactec instrument. 10/10/2018  21:16  Gram Stain: Gram negative rods.     BLOOD CULTURE x2    Collection Time: 10/10/18  3:55 AM   Result Value Ref Range    Significant Indicator POS (POS)     Source BLD     Site Central Line Blue Port     Blood Culture (A)      Growth detected by Bactec instrument. 10/10/2018  21:08  Gram Stain: Gram negative rods.     APTT (PTT)    Collection Time: 10/10/18  3:55 AM   Result Value Ref Range    APTT 35.9 24.7 - 36.0 sec   Prothrombin time (INR)    Collection Time: 10/10/18  3:55 AM   Result Value Ref Range    PT 17.7 (H) 12.0 - 14.6 sec    INR 1.44 (H) 0.87 - 1.13   ESTIMATED GFR    Collection Time: 10/10/18  3:55 AM   Result Value Ref Range    GFR If African American >60 >60 mL/min/1.73 m 2    GFR If Non African American >60 >60 mL/min/1.73 m 2   GRAM STAIN    Collection Time: 10/10/18  5:03 AM   Result Value Ref Range    Significant Indicator .     Source WND     Site Spinal Incision Wound Drainage     Gram Stain Result Many WBCs.  No organisms seen.      URINALYSIS    Collection Time: 10/10/18  5:18 AM   Result Value Ref Range    Color Yellow     Character Clear     Specific Gravity 1.026 <1.035    Ph 7.0 5.0 - 8.0    Glucose Negative Negative mg/dL    Ketones 40 (A) Negative mg/dL    Protein Negative Negative mg/dL    Bilirubin Negative Negative    Urobilinogen, Urine 0.2 Negative    Nitrite Negative Negative    Leukocyte Esterase Negative Negative    Occult Blood Negative Negative    Micro Urine Req see below    LACTIC ACID    Collection Time: 10/10/18  5:24 AM   Result Value Ref Range    Lactic Acid 1.8 0.5 - 2.0 mmol/L   PROCALCITONIN     Collection Time: 10/10/18  5:24 AM   Result Value Ref Range    Procalcitonin 51.06 (H) <0.25 ng/mL   AFB CULTURE    Collection Time: 10/10/18  7:00 AM   Result Value Ref Range    Significant Indicator NEG     Source WND     Site Lumbar Spine     AFB Culture       Culture in progress.  NOTE:  Swabs are not recommended for the isolation of  Mycobacteria, since they provide limited material.  They  are acceptable only if a specimen cannot be collected by  any other means.  Negative results obtained from these  specimens submitted on swabs are not reliable.      AFB Smear Results No acid fast bacilli seen.    GRAM STAIN    Collection Time: 10/10/18  7:00 AM   Result Value Ref Range    Significant Indicator .     Source WND     Site Lumbar Spine     Gram Stain Result Many WBCs.  No organisms seen.      ACID FAST STAIN    Collection Time: 10/10/18  7:00 AM   Result Value Ref Range    Significant Indicator NEG     Source WND     Site Lumbar Spine     AFB Smear Results No acid fast bacilli seen.    FOLATE    Collection Time: 10/10/18  9:11 AM   Result Value Ref Range    Folate -Folic Acid 10.4 >4.0 ng/mL   VITAMIN B12    Collection Time: 10/10/18  9:11 AM   Result Value Ref Range    Vitamin B12 -True Cobalamin 651 211 - 911 pg/mL   FERRITIN    Collection Time: 10/10/18  9:11 AM   Result Value Ref Range    Ferritin 756.7 (H) 22.0 - 322.0 ng/mL   CBC with Differential    Collection Time: 10/11/18  4:45 AM   Result Value Ref Range    WBC 7.0 4.8 - 10.8 K/uL    RBC 2.75 (L) 4.70 - 6.10 M/uL    Hemoglobin 9.0 (L) 14.0 - 18.0 g/dL    Hematocrit 26.0 (L) 42.0 - 52.0 %    MCV 94.5 81.4 - 97.8 fL    MCH 32.7 27.0 - 33.0 pg    MCHC 34.6 33.7 - 35.3 g/dL    RDW 44.1 35.9 - 50.0 fL    Platelet Count 214 164 - 446 K/uL    MPV 9.9 9.0 - 12.9 fL    Neutrophils-Polys 77.10 (H) 44.00 - 72.00 %    Lymphocytes 6.70 (L) 22.00 - 41.00 %    Monocytes 14.40 (H) 0.00 - 13.40 %    Eosinophils 1.00 0.00 - 6.90 %    Basophils 0.40 0.00 - 1.80 %     Immature Granulocytes 0.40 0.00 - 0.90 %    Nucleated RBC 0.00 /100 WBC    Neutrophils (Absolute) 5.39 1.82 - 7.42 K/uL    Lymphs (Absolute) 0.47 (L) 1.00 - 4.80 K/uL    Monos (Absolute) 1.01 (H) 0.00 - 0.85 K/uL    Eos (Absolute) 0.07 0.00 - 0.51 K/uL    Baso (Absolute) 0.03 0.00 - 0.12 K/uL    Immature Granulocytes (abs) 0.03 0.00 - 0.11 K/uL    NRBC (Absolute) 0.00 K/uL   Basic Metabolic Panel (BMP)    Collection Time: 10/11/18  4:45 AM   Result Value Ref Range    Sodium 128 (L) 135 - 145 mmol/L    Potassium 3.4 (L) 3.6 - 5.5 mmol/L    Chloride 97 96 - 112 mmol/L    Co2 24 20 - 33 mmol/L    Glucose 95 65 - 99 mg/dL    Bun 7 (L) 8 - 22 mg/dL    Creatinine 0.62 0.50 - 1.40 mg/dL    Calcium 7.2 (L) 8.5 - 10.5 mg/dL    Anion Gap 7.0 0.0 - 11.9   Magnesium    Collection Time: 10/11/18  4:45 AM   Result Value Ref Range    Magnesium 1.7 1.5 - 2.5 mg/dL   Phosphorus    Collection Time: 10/11/18  4:45 AM   Result Value Ref Range    Phosphorus 2.8 2.5 - 4.5 mg/dL   ESTIMATED GFR    Collection Time: 10/11/18  4:45 AM   Result Value Ref Range    GFR If African American >60 >60 mL/min/1.73 m 2    GFR If Non African American >60 >60 mL/min/1.73 m 2   RETICULOCYTES COUNT    Collection Time: 10/11/18  4:45 AM   Result Value Ref Range    Reticulocyte Count 1.0 0.8 - 2.1 %    Retic, Absolute 0.03 (L) 0.04 - 0.06 M/uL    Imm. Reticulocyte Fraction 13.0 9.3 - 17.4 %    Retic Hgb Equivalent 23.7 (L) 29.0 - 35.0 pg/cell       Imaging  CXR OK     Assessment/Plan  * Sepsis (HCC)- (present on admission)   Assessment & Plan    Severe sepsis with lactate elevation, transient hypotension and gm negative bacteremia (report labs from outside hospital). High risk of death and multiorgan failure. My titrated therapy, antibiotics and repeated bedside evaluations today reduce this risk          Surgical site infection- (present on admission)   Assessment & Plan    To OR with Dr. Manzano for I&D  Continue unasyn and vanco  Gm negative bacteria,  coverage broadened by me to Zosyn pending bacteria. Repeat blood cultures tomorrow        Anemia- (present on admission)   Assessment & Plan    Check folate, B12  Monitor postoperatively  Restrictive transfusion strategy  No evidence of active bleeding, will trend        Rigors- (present on admission)   Assessment & Plan    Fever control with APAP, rigors related to gm negative bacteremia- treatment is surgical drainage/source control (done) and antibiotics        Hyponatremia- (present on admission)   Assessment & Plan    Mild, not clinically worrisome  Continue monitor            Discussed patient condition and risk of morbidity and/or mortality with multidisciplinary    The patient remains critically ill.  Critical care time = 50 minutes in directly providing and coordinating critical care and extensive data review.  No time overlap and excludes procedures.

## 2018-10-11 NOTE — CARE PLAN
Problem: Communication  Goal: The ability to communicate needs accurately and effectively will improve  Outcome: PROGRESSING AS EXPECTED  Educated patient on the importance of communicating needs and wants. Patient expressed understanding.    Problem: Pain Management  Goal: Pain level will decrease to patient's comfort goal  Outcome: PROGRESSING AS EXPECTED  Assessed patient for signs and symptoms of pain frequently, administered medication when necessary.

## 2018-10-11 NOTE — CONSULTS
Critical Care Consultation    Date of consult: 10/10/2018    Referring Physician  Nikhil Núñez M.D.    Reason for Consultation  Severe sepsis/ epidural abscess    History of Presenting Illness  58 y.o. male who presented 10/10/2018 with fever, chills, shakes transferred to [facility] with diagnosis epidural abscess    Code Status  Full Code    Review of Systems  Review of Systems   Unable to perform ROS: Acuity of condition       Past Medical History   has a past medical history of Back pain and Numbness and tingling of left lower extremity.    Surgical History   has a past surgical history that includes other orthopedic surgery (1974/2000); lumbar fusion posterior (9/20/2018); lumbar decompression (9/20/2018); and irrigation & debridement general (10/10/2018).    Family History  family history includes Stroke in his father and paternal grandmother.    Social History   reports that he has never smoked. He has never used smokeless tobacco. He reports that he drinks alcohol. He reports that he does not use drugs.    Medications  Home Medications     Reviewed by Nathan Robles (Pharmacy Tech) on 10/10/18 at 1811  Med List Status: Complete   Medication Last Dose Status   acetaminophen (TYLENOL) 500 MG Tab 10/9/2018 Active   cyclobenzaprine (FLEXERIL) 10 MG Tab 10/9/2018 Active   diazePAM (VALIUM) 5 MG Tab 10/8/2018 Active   oxycodone (OXY-IR) 15 MG immediate release tablet 10/9/2018 Active              Current Facility-Administered Medications   Medication Dose Route Frequency Provider Last Rate Last Dose   • senna-docusate (PERICOLACE or SENOKOT S) 8.6-50 MG per tablet 2 Tab  2 Tab Oral BID Dayne Wolf M.D.        And   • polyethylene glycol/lytes (MIRALAX) PACKET 1 Packet  1 Packet Oral QDAY PRN Dayne Wolf M.D.        And   • magnesium hydroxide (MILK OF MAGNESIA) suspension 30 mL  30 mL Oral QDAY PRN Dayne Wolf M.D.        And   • bisacodyl (DULCOLAX) suppository  10 mg  10 mg Rectal QDAY PRN Dayne Wolf M.D.       • Respiratory Care per Protocol   Nebulization Continuous RT Dayne Wolf M.D.       • MD Alert...ICU Electrolyte Replacement per Pharmacy   Other pharmacy to dose Dayne Wolf M.D.       • MD Alert...Vancomycin per Pharmacy   Other pharmacy to dose Abilio Calvillo Jr. D.O.       • lactated ringers infusion  1,000 mL Intravenous Continuous Alannah Yi M.D. 100 mL/hr at 10/10/18 1417 1,000 mL at 10/10/18 1417   • HYDROmorphone (DILAUDID) injection 1 mg  1 mg Intravenous Q3HRS PRN Alannah Yi M.D.   1 mg at 10/10/18 1500   • vancomycin 1,300 mg in  mL IVPB  17 mg/kg Intravenous Q12HR Nikhil Núñez M.D. 125 mL/hr at 10/10/18 1702 1,300 mg at 10/10/18 1702   • piperacillin-tazobactam (ZOSYN) 3.375 g in  mL IVPB  3.375 g Intravenous Q8HRS Terry Estrada M.D.       • acetaminophen (TYLENOL) tablet 650 mg  650 mg Oral Q4HRS PRN Alannah Yi M.D.   650 mg at 10/10/18 1736   • norepinephrine (LEVOPHED) 8 mg in  mL Infusion  0-30 mcg/min Intravenous Continuous Alannah Yi M.D.   Stopped at 10/10/18 1800       Allergies  No Known Allergies    Vital Signs last 24 hours  Temp:  [37.5 °C (99.5 °F)-38.8 °C (101.8 °F)] 38.6 °C (101.5 °F)  Pulse:  [] 115  Resp:  [6-42] 16  BP: (119)/(82) 119/82    Physical Exam  Physical Exam   Constitutional: He appears well-developed.   HENT:   Head: Normocephalic and atraumatic.   Eyes: Pupils are equal, round, and reactive to light.   Neck: Normal range of motion.   Cardiovascular:   Tachycardic 110, hypotension to systolics 80 which I treated with fluid boluses   Abdominal: Soft. Bowel sounds are normal.   Musculoskeletal: Normal range of motion.   Neurological:   On initial exam post op sleepy unable to answer questions from anesthesia but on re-exam woke, alert, appropriate   Skin: Skin is warm and dry.   Psychiatric: He has a normal mood and affect. His behavior  is normal.       Fluids    Intake/Output Summary (Last 24 hours) at 10/10/18 1831  Last data filed at 10/10/18 1830   Gross per 24 hour   Intake          4863.45 ml   Output             2960 ml   Net          1903.45 ml       Laboratory  Recent Results (from the past 48 hour(s))   EKG    Collection Time: 10/10/18  3:46 AM   Result Value Ref Range    Report       [facility] Emergency Dept.    Test Date:  2018-10-10  Pt Name:    TRACY GARCIA               Department: ER  MRN:        [id number]                      Room:       BL 15  Gender:     Male                         Technician: [id number]  :        1960                   Requested By:KALA DANIELS  Order #:    [id number]                    Reading MD:    Measurements  Intervals                                Axis  Rate:       109                          P:          0  LA:         91                           QRS:        57  QRSD:       98                           T:          14  QT:         356  QTc:        480    Interpretive Statements  SINUS TACHYCARDIA  CONSIDER ANTERIOR INFARCT  MINIMAL ST DEPRESSION, INFERIOR LEADS  BORDERLINE PROLONGED QT INTERVAL  Compared to ECG 2018 09:52:44  Myocardial infarct finding now present  Sinus bradycardia no longer present  Short LA interval no longer present  T-wave abnormality no longer present  ST (T wave)  deviation still present     CBC WITH DIFFERENTIAL    Collection Time: 10/10/18  3:55 AM   Result Value Ref Range    WBC 8.7 4.8 - 10.8 K/uL    RBC 3.13 (L) 4.70 - 6.10 M/uL    Hemoglobin 10.4 (L) 14.0 - 18.0 g/dL    Hematocrit 29.3 (L) 42.0 - 52.0 %    MCV 93.6 81.4 - 97.8 fL    MCH 33.2 (H) 27.0 - 33.0 pg    MCHC 35.5 (H) 33.7 - 35.3 g/dL    RDW 42.5 35.9 - 50.0 fL    Platelet Count 271 164 - 446 K/uL    MPV 10.3 9.0 - 12.9 fL    Neutrophils-Polys 80.60 (H) 44.00 - 72.00 %    Lymphocytes 7.50 (L) 22.00 - 41.00 %    Monocytes 11.20 0.00 - 13.40 %    Eosinophils 0.00 0.00 -  6.90 %    Basophils 0.20 0.00 - 1.80 %    Immature Granulocytes 0.50 0.00 - 0.90 %    Nucleated RBC 0.00 /100 WBC    Neutrophils (Absolute) 7.03 1.82 - 7.42 K/uL    Lymphs (Absolute) 0.65 (L) 1.00 - 4.80 K/uL    Monos (Absolute) 0.98 (H) 0.00 - 0.85 K/uL    Eos (Absolute) 0.00 0.00 - 0.51 K/uL    Baso (Absolute) 0.02 0.00 - 0.12 K/uL    Immature Granulocytes (abs) 0.04 0.00 - 0.11 K/uL    NRBC (Absolute) 0.00 K/uL   CMP    Collection Time: 10/10/18  3:55 AM   Result Value Ref Range    Sodium 131 (L) 135 - 145 mmol/L    Potassium 4.3 3.6 - 5.5 mmol/L    Chloride 98 96 - 112 mmol/L    Co2 21 20 - 33 mmol/L    Anion Gap 12.0 (H) 0.0 - 11.9    Glucose 104 (H) 65 - 99 mg/dL    Bun 13 8 - 22 mg/dL    Creatinine 0.83 0.50 - 1.40 mg/dL    Calcium 7.5 (L) 8.5 - 10.5 mg/dL    AST(SGOT) 24 12 - 45 U/L    ALT(SGPT) 18 2 - 50 U/L    Alkaline Phosphatase 41 30 - 99 U/L    Total Bilirubin 0.6 0.1 - 1.5 mg/dL    Albumin 3.1 (L) 3.2 - 4.9 g/dL    Total Protein 5.8 (L) 6.0 - 8.2 g/dL    Globulin 2.7 1.9 - 3.5 g/dL    A-G Ratio 1.1 g/dL   APTT (PTT)    Collection Time: 10/10/18  3:55 AM   Result Value Ref Range    APTT 35.9 24.7 - 36.0 sec   Prothrombin time (INR)    Collection Time: 10/10/18  3:55 AM   Result Value Ref Range    PT 17.7 (H) 12.0 - 14.6 sec    INR 1.44 (H) 0.87 - 1.13   ESTIMATED GFR    Collection Time: 10/10/18  3:55 AM   Result Value Ref Range    GFR If African American >60 >60 mL/min/1.73 m 2    GFR If Non African American >60 >60 mL/min/1.73 m 2   GRAM STAIN    Collection Time: 10/10/18  5:03 AM   Result Value Ref Range    Significant Indicator .     Source WND     Site Spinal Incision Wound Drainage     Gram Stain Result Many WBCs.  No organisms seen.      URINALYSIS    Collection Time: 10/10/18  5:18 AM   Result Value Ref Range    Color Yellow     Character Clear     Specific Gravity 1.026 <1.035    Ph 7.0 5.0 - 8.0    Glucose Negative Negative mg/dL    Ketones 40 (A) Negative mg/dL    Protein Negative Negative  mg/dL    Bilirubin Negative Negative    Urobilinogen, Urine 0.2 Negative    Nitrite Negative Negative    Leukocyte Esterase Negative Negative    Occult Blood Negative Negative    Micro Urine Req see below    LACTIC ACID    Collection Time: 10/10/18  5:24 AM   Result Value Ref Range    Lactic Acid 1.8 0.5 - 2.0 mmol/L   PROCALCITONIN    Collection Time: 10/10/18  5:24 AM   Result Value Ref Range    Procalcitonin 51.06 (H) <0.25 ng/mL   GRAM STAIN    Collection Time: 10/10/18  7:00 AM   Result Value Ref Range    Significant Indicator .     Source WND     Site Lumbar Spine     Gram Stain Result Many WBCs.  No organisms seen.      FOLATE    Collection Time: 10/10/18  9:11 AM   Result Value Ref Range    Folate -Folic Acid 10.4 >4.0 ng/mL   VITAMIN B12    Collection Time: 10/10/18  9:11 AM   Result Value Ref Range    Vitamin B12 -True Cobalamin 651 211 - 911 pg/mL   FERRITIN    Collection Time: 10/10/18  9:11 AM   Result Value Ref Range    Ferritin 756.7 (H) 22.0 - 322.0 ng/mL       Imaging  CXR OK     Assessment/Plan  * Sepsis (HCC)- (present on admission)   Assessment & Plan    This is sepsis (without associated acute organ dysfunction).   sepsis protocol, source: surgical site  Transient hypotension - improved with IVF  source targeted antibiotics: unasyn, vanco, source control in OR with  for I&D  30 mL/kg crystalloid bolus provided PTA  PRN IVF bolus to maintain MAP >65 mmHg  Pressors if needed to maintain MAP >65 mmHg  blood, respiratory and urine cultures  lactate every 4 hours until normalized or downtrending          Surgical site infection- (present on admission)   Assessment & Plan    To OR with Dr. Manzano for I&D  Continue unasyn and vanco  F/U surgical cultures, blood cultures        Anemia- (present on admission)   Assessment & Plan    Check folate, B12  Monitor postoperatively  Restrictive transfusion strategy        Rigors- (present on admission)   Assessment & Plan    Fever control with APAP         Hyponatremia- (present on admission)   Assessment & Plan    Mild  Continue monitor            Discussed patient condition and risk of morbidity and/or mortality with multidisciplinary    The patient remains critically ill.  Critical care time = 100 minutes in directly providing and coordinating critical care and extensive data review.  No time overlap and excludes procedures.

## 2018-10-11 NOTE — PROGRESS NOTES
Bedside report received from night RN.  Pt assessed A&Ox4, c/o 3/10 back pain prior to movement, once moved, medicated with PRN medication due to 6/10 pain, no sob noted.  POC discussed with pt, all questions answered.  Pt states all needs are met.  Bed  alarm on, bed in lowest position, call light within reach.  Will continue to monitor

## 2018-10-11 NOTE — PROGRESS NOTES
Dr Yi updated on blood pressure and HR, giving 500ml NS bolus, CVP setup, showing -6, Kike RN Supervisor assessed and unable to change.  Dr Calvillo notified, received order for 1L LR bolus and will come see patient

## 2018-10-11 NOTE — PROGRESS NOTES
[facility] Internal Medicine Interval Note    Name Tomás Urrutia     1960   Age/Sex 58 y.o. male   MRN [id number]   Code Status Full     After 5PM or if no immediate response to page, please call for cross-coverage  Attending/Team: Dr. Estrada / ISAIAS SPICER Call [phone] to page   Resident:   Dr. Romo          Chief complaint/ reason for interval visit (Primary Diagnosis)   Patient is a 59 y/o male with a PMH of low back pain s/p lumbar fusion on 18 presented as a transfer from [facility] for further evaluation of sepsis 2/2 surgical wound infection. Central line was placed prior to transfer for hypotension. CT at OSH showed an epidural collection. Patient received fluid resuscitation and empiric antibiotic therapy with vancomycin and zosyn. S/p I&D with complex wound closure (10/10)    Interval Problem Daily Status Update    - Tm: 100.8  - ST: 90-110s  - SBP: 90-120s  - BCx: GNR  - WCx: NGTD   - Off pressors this am  - Will DC vancomycin and check with surgery for repeat washout     Review of Systems   Constitutional: Positive for chills and fever.   Respiratory: Negative for cough and shortness of breath.    Cardiovascular: Negative for chest pain and leg swelling.   Gastrointestinal: Negative for nausea and vomiting.   Musculoskeletal: Positive for back pain.   Neurological: Negative for dizziness.       Consultants/Specialty  Orthopedic surgery     Disposition  ICU for hypotension and need for pressors     Quality Measures  Quality-Core Measures   Reviewed items::  Labs reviewed, EKG reviewed, Radiology images reviewed and Medications reviewed  DVT prophylaxis - mechanical:  SCDs  Ulcer Prophylaxis::  Not indicated  Antibiotics:  Treating active infection/contamination beyond 24 hours perioperative coverage      Physical Exam       Vitals:    10/11/18 0400 10/11/18 0500 10/11/18 0600 10/11/18 0800   BP:       Pulse: (!) 105 (!) 101 99    Resp: 13 14 (!) 24     Temp: (!) 38.2 °C (100.8 °F)   37.1 °C (98.8 °F)   SpO2: 94% 97% 100%    Weight:       Height:         Body mass index is 23.25 kg/m².    Oxygen Therapy:  Pulse Oximetry: 100 %, O2 (LPM): 1, O2 Delivery: Silicone Nasal Cannula    Physical Exam   Constitutional: He is oriented to person, place, and time and well-developed, well-nourished, and in no distress. No distress.   Cardiovascular: Regular rhythm and normal heart sounds.    Tachycardia    Pulmonary/Chest: Effort normal and breath sounds normal. No respiratory distress.   Basilar crackles    Abdominal: Soft. Bowel sounds are normal. There is no tenderness.   Musculoskeletal: He exhibits tenderness.   Clean, intact, dry dressing on the back   Minimal bloody discharge on drain    Neurological: He is alert and oriented to person, place, and time.   Skin: He is not diaphoretic.   Psychiatric: Affect normal.         Lab Data Review:      10/11/2018  7:28 AM    Recent Labs      10/10/18   0355  10/11/18   0445   SODIUM  131*  128*   POTASSIUM  4.3  3.4*   CHLORIDE  98  97   CO2  21  24   BUN  13  7*   CREATININE  0.83  0.62   MAGNESIUM   --   1.7   PHOSPHORUS   --   2.8   CALCIUM  7.5*  7.2*       Recent Labs      10/10/18   0355  10/11/18   0445   ALTSGPT  18   --    ASTSGOT  24   --    ALKPHOSPHAT  41   --    TBILIRUBIN  0.6   --    GLUCOSE  104*  95       Recent Labs      10/10/18   0355  10/10/18   0911  10/11/18   0445   RBC  3.13*   --   2.75*   HEMOGLOBIN  10.4*   --   9.0*   HEMATOCRIT  29.3*   --   26.0*   PLATELETCT  271   --   214   PROTHROMBTM  17.7*   --    --    APTT  35.9   --    --    INR  1.44*   --    --    FERRITIN   --   756.7*   --        Recent Labs      10/10/18   0355  10/11/18   0445   WBC  8.7  7.0   NEUTSPOLYS  80.60*  77.10*   LYMPHOCYTES  7.50*  6.70*   MONOCYTES  11.20  14.40*   EOSINOPHILS  0.00  1.00   BASOPHILS  0.20  0.40   ASTSGOT  24   --    ALTSGPT  18   --    ALKPHOSPHAT  41   --    TBILIRUBIN  0.6   --             Assessment/Plan     * Sepsis (HCC)- (present on admission)   Assessment & Plan    This is sepsis (without associated acute organ dysfunction).   SIRS 3/4 (HR, RR and Temp)  qSOFA 2/3 (RR and SBP)  S/p fluid resuscitation and empiric antibiotic therapy on admission   CT scan at OSH showed epidural abscess s/p I&D on admission by Dr. Manzano (10/10)  Blood Cx at OSH grew GNR   Blood Cx on admission: GNR   Wound Cx: NGTD   Plan:  - Discontinue vancomycin   - Continue zosyn   - Follow cultures result   - Follow surgery rec's (Dr. Rose)   - Maintain MAP >65   - PRN pain meds         Surgical site infection- (present on admission)   Assessment & Plan    Mid line lumbar spine surgical site erythema, edema and purulent secretion  S/p I&D and wound closure by Dr. Manzano (10/10)  Blood Cx: GNR   Wound Cx: NGTD   Orthopedic surgery (Dr. Sanders) is following   See sepsis section         Hypomagnesemia   Assessment & Plan    Replete as needed         Hypokalemia   Assessment & Plan    Replete as needed         Anemia- (present on admission)   Assessment & Plan    Normocytic anemia  Inflammatory vs post-surgical anemia   CTM        Hyponatremia- (present on admission)   Assessment & Plan    Likely 2/2 SIADH (recent surgery and pain)  Continue IV fluids in the setting of sepsis   CTM

## 2018-10-11 NOTE — PROGRESS NOTES
Med Rec Updated and Complete per Pt at bedside with permission to do so in front of family/visitor  Allergies Reviewed  No PO ABX last 30 days.

## 2018-10-11 NOTE — THERAPY
"Pt is a 59 y/o male admitted for sepsis following lumbar spine infection. Pt is s/p I&D with wound closure at lumbar spine. Inital L3-5 fusion and L2-S1 decompression was completed on 9/20/18 by Dr. Sanders. Pt reports his mobility and recovery was going very well at home until infection began. Pt tolerated ambulation x1 lap around unit, required standing rest break x3 min care home through as HR elevated to 113-126 bpm, without symptom report. Pt will benefit from acute PT interventions to address present impairments.     Physical Therapy Evaluation completed.   Bed Mobility:  Supine to Sit:  (in chair upon PT arrival)  Transfers: Sit to Stand: Contact Guard Assist  Gait: Level Of Assist: Minimal Assist with Wheelchair push on ICU x1 lap around unit       Plan of Care: Will benefit from Physical Therapy 5 times per week  Discharge Recommendations: Equipment: Will Continue to Assess for Equipment Needs.     See \"Rehab Therapy-Acute\" Patient Summary Report for complete documentation.     "

## 2018-10-12 LAB
ANION GAP SERPL CALC-SCNC: 11 MMOL/L (ref 0–11.9)
BACTERIA UR CULT: NORMAL
BACTERIA WND AEROBE CULT: ABNORMAL
BASOPHILS # BLD AUTO: 0.4 % (ref 0–1.8)
BASOPHILS # BLD: 0.03 K/UL (ref 0–0.12)
BUN SERPL-MCNC: 6 MG/DL (ref 8–22)
CALCIUM SERPL-MCNC: 7.7 MG/DL (ref 8.5–10.5)
CHLORIDE SERPL-SCNC: 95 MMOL/L (ref 96–112)
CO2 SERPL-SCNC: 24 MMOL/L (ref 20–33)
CREAT SERPL-MCNC: 0.6 MG/DL (ref 0.5–1.4)
EOSINOPHIL # BLD AUTO: 0.17 K/UL (ref 0–0.51)
EOSINOPHIL NFR BLD: 2.4 % (ref 0–6.9)
ERYTHROCYTE [DISTWIDTH] IN BLOOD BY AUTOMATED COUNT: 42.5 FL (ref 35.9–50)
GLUCOSE SERPL-MCNC: 82 MG/DL (ref 65–99)
GRAM STN SPEC: ABNORMAL
GRAM STN SPEC: ABNORMAL
HCT VFR BLD AUTO: 24.8 % (ref 42–52)
HGB BLD-MCNC: 8.9 G/DL (ref 14–18)
IMM GRANULOCYTES # BLD AUTO: 0.02 K/UL (ref 0–0.11)
IMM GRANULOCYTES NFR BLD AUTO: 0.3 % (ref 0–0.9)
LYMPHOCYTES # BLD AUTO: 0.83 K/UL (ref 1–4.8)
LYMPHOCYTES NFR BLD: 11.8 % (ref 22–41)
MAGNESIUM SERPL-MCNC: 1.8 MG/DL (ref 1.5–2.5)
MCH RBC QN AUTO: 33 PG (ref 27–33)
MCHC RBC AUTO-ENTMCNC: 35.9 G/DL (ref 33.7–35.3)
MCV RBC AUTO: 91.9 FL (ref 81.4–97.8)
MONOCYTES # BLD AUTO: 0.94 K/UL (ref 0–0.85)
MONOCYTES NFR BLD AUTO: 13.4 % (ref 0–13.4)
NEUTROPHILS # BLD AUTO: 5.03 K/UL (ref 1.82–7.42)
NEUTROPHILS NFR BLD: 71.7 % (ref 44–72)
NRBC # BLD AUTO: 0 K/UL
NRBC BLD-RTO: 0 /100 WBC
PHOSPHATE SERPL-MCNC: 3 MG/DL (ref 2.5–4.5)
PLATELET # BLD AUTO: 202 K/UL (ref 164–446)
PMV BLD AUTO: 9.6 FL (ref 9–12.9)
POTASSIUM SERPL-SCNC: 3.7 MMOL/L (ref 3.6–5.5)
PROCALCITONIN SERPL-MCNC: 19.15 NG/ML
RBC # BLD AUTO: 2.7 M/UL (ref 4.7–6.1)
SIGNIFICANT IND 70042: ABNORMAL
SIGNIFICANT IND 70042: ABNORMAL
SIGNIFICANT IND 70042: NORMAL
SITE SITE: ABNORMAL
SITE SITE: ABNORMAL
SITE SITE: NORMAL
SODIUM SERPL-SCNC: 130 MMOL/L (ref 135–145)
SOURCE SOURCE: ABNORMAL
SOURCE SOURCE: ABNORMAL
SOURCE SOURCE: NORMAL
WBC # BLD AUTO: 7 K/UL (ref 4.8–10.8)

## 2018-10-12 PROCEDURE — 700111 HCHG RX REV CODE 636 W/ 250 OVERRIDE (IP): Performed by: INTERNAL MEDICINE

## 2018-10-12 PROCEDURE — 99233 SBSQ HOSP IP/OBS HIGH 50: CPT | Performed by: INTERNAL MEDICINE

## 2018-10-12 PROCEDURE — 700102 HCHG RX REV CODE 250 W/ 637 OVERRIDE(OP): Performed by: STUDENT IN AN ORGANIZED HEALTH CARE EDUCATION/TRAINING PROGRAM

## 2018-10-12 PROCEDURE — 80048 BASIC METABOLIC PNL TOTAL CA: CPT

## 2018-10-12 PROCEDURE — 700111 HCHG RX REV CODE 636 W/ 250 OVERRIDE (IP): Performed by: STUDENT IN AN ORGANIZED HEALTH CARE EDUCATION/TRAINING PROGRAM

## 2018-10-12 PROCEDURE — A9270 NON-COVERED ITEM OR SERVICE: HCPCS | Performed by: STUDENT IN AN ORGANIZED HEALTH CARE EDUCATION/TRAINING PROGRAM

## 2018-10-12 PROCEDURE — 770022 HCHG ROOM/CARE - ICU (200)

## 2018-10-12 PROCEDURE — 700105 HCHG RX REV CODE 258: Performed by: INTERNAL MEDICINE

## 2018-10-12 PROCEDURE — 84100 ASSAY OF PHOSPHORUS: CPT

## 2018-10-12 PROCEDURE — 85025 COMPLETE CBC W/AUTO DIFF WBC: CPT

## 2018-10-12 PROCEDURE — 83735 ASSAY OF MAGNESIUM: CPT

## 2018-10-12 PROCEDURE — 84145 PROCALCITONIN (PCT): CPT

## 2018-10-12 RX ORDER — OXYCODONE HYDROCHLORIDE 10 MG/1
10 TABLET ORAL EVERY 4 HOURS PRN
Status: DISCONTINUED | OUTPATIENT
Start: 2018-10-12 | End: 2018-10-12

## 2018-10-12 RX ORDER — OXYCODONE HYDROCHLORIDE 5 MG/1
15 TABLET ORAL
Status: DISCONTINUED | OUTPATIENT
Start: 2018-10-12 | End: 2018-10-14 | Stop reason: HOSPADM

## 2018-10-12 RX ORDER — POTASSIUM CHLORIDE 20 MEQ/1
40 TABLET, EXTENDED RELEASE ORAL ONCE
Status: COMPLETED | OUTPATIENT
Start: 2018-10-12 | End: 2018-10-12

## 2018-10-12 RX ORDER — MAGNESIUM SULFATE HEPTAHYDRATE 40 MG/ML
2 INJECTION, SOLUTION INTRAVENOUS ONCE
Status: COMPLETED | OUTPATIENT
Start: 2018-10-12 | End: 2018-10-12

## 2018-10-12 RX ORDER — MAGNESIUM SULFATE 1 G/100ML
1 INJECTION INTRAVENOUS ONCE
Status: DISCONTINUED | OUTPATIENT
Start: 2018-10-12 | End: 2018-10-12

## 2018-10-12 RX ADMIN — SENNOSIDES AND DOCUSATE SODIUM 2 TABLET: 8.6; 5 TABLET ORAL at 06:00

## 2018-10-12 RX ADMIN — OXYCODONE HYDROCHLORIDE 10 MG: 10 TABLET ORAL at 10:14

## 2018-10-12 RX ADMIN — PIPERACILLIN AND TAZOBACTAM 3.38 G: 3; .375 INJECTION, POWDER, LYOPHILIZED, FOR SOLUTION INTRAVENOUS; PARENTERAL at 20:01

## 2018-10-12 RX ADMIN — OXYCODONE HYDROCHLORIDE 15 MG: 5 TABLET ORAL at 20:01

## 2018-10-12 RX ADMIN — OXYCODONE HYDROCHLORIDE 15 MG: 5 TABLET ORAL at 16:07

## 2018-10-12 RX ADMIN — PIPERACILLIN AND TAZOBACTAM 3.38 G: 3; .375 INJECTION, POWDER, LYOPHILIZED, FOR SOLUTION INTRAVENOUS; PARENTERAL at 04:23

## 2018-10-12 RX ADMIN — PIPERACILLIN AND TAZOBACTAM 3.38 G: 3; .375 INJECTION, POWDER, LYOPHILIZED, FOR SOLUTION INTRAVENOUS; PARENTERAL at 13:02

## 2018-10-12 RX ADMIN — HYDROMORPHONE HYDROCHLORIDE 1 MG: 2 INJECTION INTRAMUSCULAR; INTRAVENOUS; SUBCUTANEOUS at 03:38

## 2018-10-12 RX ADMIN — SODIUM CHLORIDE, SODIUM GLUCONATE, SODIUM ACETATE, POTASSIUM CHLORIDE AND MAGNESIUM CHLORIDE: 526; 502; 368; 37; 30 INJECTION, SOLUTION INTRAVENOUS at 05:55

## 2018-10-12 RX ADMIN — OXYCODONE HYDROCHLORIDE 15 MG: 5 TABLET ORAL at 13:02

## 2018-10-12 RX ADMIN — HYDROMORPHONE HYDROCHLORIDE 1 MG: 2 INJECTION INTRAMUSCULAR; INTRAVENOUS; SUBCUTANEOUS at 05:55

## 2018-10-12 RX ADMIN — MAGNESIUM SULFATE IN WATER 2 G: 40 INJECTION, SOLUTION INTRAVENOUS at 16:07

## 2018-10-12 RX ADMIN — POTASSIUM CHLORIDE 40 MEQ: 1500 TABLET, EXTENDED RELEASE ORAL at 10:14

## 2018-10-12 ASSESSMENT — PAIN SCALES - GENERAL
PAINLEVEL_OUTOF10: 7
PAINLEVEL_OUTOF10: 7
PAINLEVEL_OUTOF10: 6
PAINLEVEL_OUTOF10: 6
PAINLEVEL_OUTOF10: 4
PAINLEVEL_OUTOF10: 5
PAINLEVEL_OUTOF10: 6
PAINLEVEL_OUTOF10: 8
PAINLEVEL_OUTOF10: 6
PAINLEVEL_OUTOF10: 6
PAINLEVEL_OUTOF10: 7
PAINLEVEL_OUTOF10: 8
PAINLEVEL_OUTOF10: 6
PAINLEVEL_OUTOF10: 7
PAINLEVEL_OUTOF10: 7
PAINLEVEL_OUTOF10: 8
PAINLEVEL_OUTOF10: 5

## 2018-10-12 ASSESSMENT — ENCOUNTER SYMPTOMS
HEMATOLOGIC/LYMPHATIC NEGATIVE: 1
CHILLS: 0
NAUSEA: 0
FATIGUE: 0
CONSTIPATION: 0
ALLERGIC/IMMUNOLOGIC NEGATIVE: 1
BACK PAIN: 1
FEVER: 0
VOMITING: 0
CONSTIPATION: 1
ACTIVITY CHANGE: 0
CHILLS: 1
DIAPHORESIS: 0
BLURRED VISION: 0
MUSCULOSKELETAL NEGATIVE: 1
SHORTNESS OF BREATH: 0
DIZZINESS: 0
NEUROLOGICAL NEGATIVE: 1
COUGH: 0
EYES NEGATIVE: 1
ENDOCRINE NEGATIVE: 1
APPETITE CHANGE: 0
FEVER: 1
RESPIRATORY NEGATIVE: 1

## 2018-10-12 NOTE — PROGRESS NOTES
Progress Note               Author: Romain Sanders Date & Time created: 10/12/2018  10:21 AM     Interval History:  Pt improving physiological parameters normalizing    Review of Systems:  ROS    Physical Exam:  Physical Exam   Musculoskeletal:   Wound drainage in hemovac non purulent serosanguinous  Wound with some serosanginous drainage around wound and on dressing  No purulence fluctuance  Sensation and motor intact b le       Labs:        Invalid input(s): [id number]      Recent Labs      10/10/18   0355  10/11/18   0445  10/12/18   0341   SODIUM  131*  128*  130*   POTASSIUM  4.3  3.4*  3.7   CHLORIDE  98  97  95*   CO2  21  24  24   BUN  13  7*  6*   CREATININE  0.83  0.62  0.60   MAGNESIUM   --   1.7  1.8   PHOSPHORUS   --   2.8  3.0   CALCIUM  7.5*  7.2*  7.7*     Recent Labs      10/10/18   0355  10/11/18   0445  10/12/18   0341   ALTSGPT  18   --    --    ASTSGOT  24   --    --    ALKPHOSPHAT  41   --    --    TBILIRUBIN  0.6   --    --    GLUCOSE  104*  95  82     Recent Labs      10/10/18   0355  10/10/18   0911  10/11/18   0445  10/12/18   0341   RBC  3.13*   --   2.75*  2.70*   HEMOGLOBIN  10.4*   --   9.0*  8.9*   HEMATOCRIT  29.3*   --   26.0*  24.8*   PLATELETCT  271   --   214  202   PROTHROMBTM  17.7*   --    --    --    APTT  35.9   --    --    --    INR  1.44*   --    --    --    FERRITIN   --   756.7*   --    --      Recent Labs      10/10/18   0355  10/11/18   0445  10/12/18   0341   WBC  8.7  7.0  7.0   NEUTSPOLYS  80.60*  77.10*  71.70   LYMPHOCYTES  7.50*  6.70*  11.80*   MONOCYTES  11.20  14.40*  13.40   EOSINOPHILS  0.00  1.00  2.40   BASOPHILS  0.20  0.40  0.40   ASTSGOT  24   --    --    ALTSGPT  18   --    --    ALKPHOSPHAT  41   --    --    TBILIRUBIN  0.6   --    --      Hemodynamics:  Temp (24hrs), Av.9 °C (100.3 °F), Min:37.8 °C (100 °F), Max:38.2 °C (100.8 °F)  Temperature: 37.8 °C (100 °F), Monitored Temp: 37.8 °C (100 °F)  Pulse  Av.2  Min: 42  Max: 127Heart Rate  (Monitored): (!) 107  Arterial BP: 150/81, NIBP: 142/82  CVP (mm Hg): (!) 221 MM HG  Respiratory:    Respiration: 13, Pulse Oximetry: 98 %     Work Of Breathing / Effort: Mild  RUL Breath Sounds: Clear, RML Breath Sounds: Clear, RLL Breath Sounds: Diminished, JLUIS Breath Sounds: Clear, LLL Breath Sounds: Diminished  Fluids:    Intake/Output Summary (Last 24 hours) at 10/12/18 1021  Last data filed at 10/12/18 0800   Gross per 24 hour   Intake          2505.41 ml   Output             2715 ml   Net          -209.59 ml     Weight: 77.7 kg (171 lb 4.8 oz)  GI/Nutrition:  No orders of the defined types were placed in this encounter.    Medical Decision Making, by Problem:  Active Hospital Problems    Diagnosis   • *Sepsis (HCC) [A41.9]   • Surgical site infection [T81.49XA]   • Hypokalemia [E87.6]   • Hypomagnesemia [E83.42]   • Hyponatremia [E87.1]   • Anemia [D64.9]   • Lumbar disc disease [M51.9]       Plan:  Dressing changes q day  Keep drain for now    Physiological parameters improved yesterday with serosanguinious hemovac drainage so did not do second I and d  Watching closely with internal medicine  Order for oxycodone 15 q 3 hrs for severe pain  Quality-Core Measures

## 2018-10-12 NOTE — PROGRESS NOTES
[facility] Interval Note    Name Tomás Urrutia     1960   Age/Sex 58 y.o. male   MRN [id number]   Code Status Full     After 5PM or if no immediate response to page, please call for cross-coverage  Attending/Team: Dr. Estrada / ISAIAS SPICER Call [phone] to page   Resident:   Dr. Yi          Chief complaint/ reason for interval visit (Primary Diagnosis)   Patient is a 57 y/o male with a PMH of low back pain s/p lumbar fusion on 18 presented as a transfer from [facility] for further evaluation of sepsis 2/2 surgical wound infection. Central line was placed prior to transfer for hypotension. CT at OSH showed an epidural collection. Patient received fluid resuscitation and empiric antibiotic therapy with vancomycin and zosyn. S/p I&D with complex wound closure (10/10)    Interval Problem Daily Status Update    C/o pain at the surgical site  Increased oxycodone to 15mg q3hrs prn    Off pressors>24hrs  SBP in 130s to 140s  Tachycardic likely 2/2 pain    Blood cx showed Non lactose fermenting G-ve rods, likely proteus  Wound cx showed proteus  Continue zosyn, f/u final cx and sensitivities, deescalate abx based on cx results    Surgery on board- No plan for 2nd I&D as patient improving, qday dressing changes      Review of Systems   Constitutional: Positive for chills and fever.   Eyes: Negative for blurred vision.   Respiratory: Negative for cough and shortness of breath.    Cardiovascular: Negative for chest pain and leg swelling.   Gastrointestinal: Positive for constipation. Negative for nausea and vomiting.   Musculoskeletal: Positive for back pain.   Neurological: Negative for dizziness.       Consultants/Specialty  Orthopedic surgery     Disposition  ICU for hypotension and need for pressors     Quality Measures  Quality-Core Measures   Reviewed items::  Labs reviewed, EKG reviewed, Radiology images reviewed and Medications reviewed  DVT prophylaxis  - mechanical:  SCDs  Ulcer Prophylaxis::  Not indicated  Antibiotics:  Treating active infection/contamination beyond 24 hours perioperative coverage      Physical Exam       Vitals:    10/12/18 0700 10/12/18 0800 10/12/18 0900 10/12/18 1000   BP:       Pulse: (!) 102 (!) 104 (!) 104 (!) 108   Resp: (!) 30 13 17 (!) 30   Temp:       SpO2: 98% 98% 99% 98%   Weight:       Height:         Body mass index is 23.89 kg/m². Weight: 77.7 kg (171 lb 4.8 oz)  Oxygen Therapy:  Pulse Oximetry: 98 %, O2 (LPM): 1, O2 Delivery: None (Room Air)    Physical Exam   Constitutional: He is oriented to person, place, and time and well-developed, well-nourished, and in no distress. No distress.   Cardiovascular: Regular rhythm and normal heart sounds.    Tachycardia    Pulmonary/Chest: Effort normal and breath sounds normal. No respiratory distress.   Abdominal: Soft. Bowel sounds are normal. There is no tenderness.   Musculoskeletal: He exhibits tenderness.   Mild discharge on dressing on the back   Wound vac draining serosanguinous discharge     Neurological: He is alert and oriented to person, place, and time.   Skin: He is not diaphoretic.   Psychiatric: Affect normal.         Lab Data Review:      10/11/2018  7:28 AM    Recent Labs      10/10/18   0355  10/11/18   0445  10/12/18   0341   SODIUM  131*  128*  130*   POTASSIUM  4.3  3.4*  3.7   CHLORIDE  98  97  95*   CO2  21  24  24   BUN  13  7*  6*   CREATININE  0.83  0.62  0.60   MAGNESIUM   --   1.7  1.8   PHOSPHORUS   --   2.8  3.0   CALCIUM  7.5*  7.2*  7.7*       Recent Labs      10/10/18   0355  10/11/18   0445  10/12/18   0341   ALTSGPT  18   --    --    ASTSGOT  24   --    --    ALKPHOSPHAT  41   --    --    TBILIRUBIN  0.6   --    --    GLUCOSE  104*  95  82       Recent Labs      10/10/18   0355  10/10/18   0911  10/11/18   0445  10/12/18   0341   RBC  3.13*   --   2.75*  2.70*   HEMOGLOBIN  10.4*   --   9.0*  8.9*   HEMATOCRIT  29.3*   --   26.0*  24.8*   PLATELETCT  271    --   214  202   PROTHROMBTM  17.7*   --    --    --    APTT  35.9   --    --    --    INR  1.44*   --    --    --    FERRITIN   --   756.7*   --    --        Recent Labs      10/10/18   0355  10/11/18   0445  10/12/18   0341   WBC  8.7  7.0  7.0   NEUTSPOLYS  80.60*  77.10*  71.70   LYMPHOCYTES  7.50*  6.70*  11.80*   MONOCYTES  11.20  14.40*  13.40   EOSINOPHILS  0.00  1.00  2.40   BASOPHILS  0.20  0.40  0.40   ASTSGOT  24   --    --    ALTSGPT  18   --    --    ALKPHOSPHAT  41   --    --    TBILIRUBIN  0.6   --    --            Assessment/Plan     * Sepsis (HCC)- (present on admission)   Assessment & Plan    This is sepsis (without associated acute organ dysfunction).   SIRS 3/4 (HR, RR and Temp)  qSOFA 2/3 (RR and SBP)  S/p fluid resuscitation and empiric antibiotic therapy on admission   CT scan at OSH showed epidural abscess s/p I&D on admission by Dr. Manzano (10/10)  Blood Cx at OSH grew GNR   Blood Cx: non lactose fermenting GNR   Wound Cx: Proteus   Plan:  - Continue zosyn   - Follow cultures and sensitivities- deescalate abx based on results   - Follow surgery rec's (Dr. Rose)   - PRN pain meds         Surgical site infection- (present on admission)   Assessment & Plan    Mid line lumbar spine surgical site erythema, edema and purulent secretion  S/p I&D and wound closure by Dr. Manzano (10/10)  Blood Cx: non lactose fermenting GNR   Wound Cx: Proteus  Continue zosyn, deescalate based on cx and sensitivities   Orthopedic surgery (Dr. Sanders) is following- no plan for 2nd I&D as pt clinically improving   Monitor drain output  Daily dressing changes, wound consult placed  PT on board  To consider inpatient vs outpatient ID consult on whether hardware has to be removed- not an urgent indication per surgery        Hypomagnesemia   Assessment & Plan    Replete as needed         Hypokalemia   Assessment & Plan    Replete as needed         Anemia- (present on admission)   Assessment & Plan    Normocytic  anemia  Inflammatory vs post-surgical anemia   CTM        Hyponatremia- (present on admission)   Assessment & Plan    Improving  Likely 2/2 SIADH (recent surgery and pain)  CTM

## 2018-10-12 NOTE — PROGRESS NOTES
12 hour chart check and monitor summary    Sinus Rhythm - Sinus Tach    HR 90s-120    .12/.08./.32    2 RN skin check

## 2018-10-12 NOTE — CARE PLAN
Problem: Infection  Goal: Will remain free from infection    Intervention: Assess signs and symptoms of infection  Tmax of 38.0 this shift. Hovering between 37.8-38.0 for entire shift. No rigors noted, patient states he feels much better. WBCs still normal, will draw procalcitonin in the morning.       Problem: Skin Integrity  Goal: Risk for impaired skin integrity will decrease    Intervention: Assess risk factors for impaired skin integrity and/or pressure ulcers  Reinforced spinal dressing due to serosanguinous drainage at the bottom of dressing. Dressing was saturated and leaked onto sheet on the bed. Will continue to monitor

## 2018-10-12 NOTE — CARE PLAN
Interval History: Patient complains of phlebotomies. No fever or chills.     Review of Systems   Constitutional: Negative for chills and fever.   Psychiatric/Behavioral: Positive for confusion.   All other systems reviewed and are negative.    Objective:     Vital Signs (Most Recent):  Temp: 98.5 °F (36.9 °C) (08/10/17 0700)  Pulse: 83 (08/10/17 0903)  Resp: (!) 22 (08/10/17 0800)  BP: (!) 156/69 (08/10/17 0800)  SpO2: 100 % (08/10/17 0800) Vital Signs (24h Range):  Temp:  [97.8 °F (36.6 °C)-99.6 °F (37.6 °C)] 98.5 °F (36.9 °C)  Pulse:  [60-95] 83  Resp:  [14-23] 22  SpO2:  [98 %-100 %] 100 %  BP: (137-182)/() 156/69  Arterial Line BP: (144-190)/(56-71) 166/65     Weight: 56.1 kg (123 lb 10.9 oz)  Body mass index is 15.88 kg/m².    Estimated Creatinine Clearance: 43.2 mL/min (based on Cr of 1.3).    Physical Exam   Constitutional: He appears well-developed and well-nourished. No distress.   HENT:   Head: Normocephalic and atraumatic.   Eyes: EOM are normal. Pupils are equal, round, and reactive to light.   Cardiovascular: Normal rate and regular rhythm.    Murmur heard.  Pulmonary/Chest: Effort normal and breath sounds normal.   Abdominal: Soft. Bowel sounds are normal. He exhibits no distension.   Musculoskeletal: Normal range of motion. He exhibits no edema.   Neurological: He is alert.   Skin: No rash noted. He is not diaphoretic.   Psychiatric: He has a normal mood and affect. His behavior is normal.   Nursing note and vitals reviewed.      Significant Labs:   Blood Culture:   Recent Labs  Lab 08/04/17  0508 08/05/17  1316 08/07/17  1206 08/09/17  1618 08/09/17  1628   LABBLOO Gram stain aer bottle: Gram positive cocci in chains resembling Strep   Results called to and read back by:Kendra Montez RN 08/05/2017  11:15  ENTEROCOCCUS FAECALISFor susceptibility see order #[id number]  Gram stain aer bottle: Gram positive cocci in chains resembling Strep   Results called to and read back by: Bess Montez  Problem: Bowel/Gastric:  Goal: Will not experience complications related to bowel motility  Outcome: PROGRESSING AS EXPECTED  Patient agreed to ambulate and take bowel regimine with the increased amounts of narcotics .     Problem: Pain Management  Goal: Pain level will decrease to patient's comfort goal  Outcome: PROGRESSING AS EXPECTED  Increased pain medicine to post op levels . Pain much better controlled per patient after increasing 5>15 oxy        08/05/2017  17:08  ENTEROCOCCUS FAECALISFor susceptibility see order #[id number] Gram stain ani bottle: Gram positive cocci in clusters resembling Staph   Results called to and read back by: Jimmy Willis RN  08/06/2017  21:11  Gram stain aer bottle: Gram positive cocci in clusters resembling Staph  08/07/2017  13:45  COAGULASE-NEGATIVE STAPHYLOCOCCUS SPECIESOrganism is a probable contaminant No Growth to date  No Growth to date  No Growth to date  No Growth to date  No Growth to date  No Growth to date No Growth to date No Growth to date     BMP:   Recent Labs  Lab 08/10/17  0216 08/10/17  0510     --     144   K 4.3  --    *  --    CO2 25  --    BUN 19  --    CREATININE 1.3  --    CALCIUM 8.2*  --    MG 1.8  --      CBC:   Recent Labs  Lab 08/09/17  0200 08/10/17  0216   WBC 10.00 9.24   HGB 8.5* 7.9*   HCT 26.6* 23.6*    249       Significant Imaging: I have reviewed all pertinent imaging results/findings within the past 24 hours.

## 2018-10-12 NOTE — CONSULTS
Critical Care Consultation      Referring Physician  Nikhil Núñez M.D.    Reason for Consultation  Severe sepsis/ epidural abscess    History of Presenting Illness  58 y.o. male who presented 10/10/2018 with fever, chills, shakes transferred to [facility] with diagnosis epidural abscess    Code Status  Full Code     Last 24 hours  - remains off pressors  - starting to become hungry  - afebrile  - difficulty standing secondary to pain  - feeling better since yesterday  - proteus in both blood and surgical wound  - feels stronger      Previous 24 hours  - intermittent pressor use last night  - several fluid boluses for low pressure  - fevers to almost 39  - gm negatives in blood and in drainage from wound in OR  - urine output OK  - not currently on pressors  - remains critically ill      Review of Systems  Review of Systems   Unable to perform ROS: Acuity of condition (Able to do ROS today (not yesterday))   Constitutional: Negative for activity change, appetite change, chills, diaphoresis, fatigue and fever.   HENT: Negative.    Eyes: Negative.    Respiratory: Negative.    Cardiovascular: Positive for leg swelling.   Gastrointestinal: Negative for constipation.   Endocrine: Negative.    Genitourinary: Negative.    Musculoskeletal: Negative.    Skin: Negative.    Allergic/Immunologic: Negative.    Neurological: Negative.    Hematological: Negative.        Past Medical History   has a past medical history of Back pain and Numbness and tingling of left lower extremity.    Surgical History   has a past surgical history that includes other orthopedic surgery (1974/2000); lumbar fusion posterior (9/20/2018); lumbar decompression (9/20/2018); and irrigation & debridement general (10/10/2018).    Family History  family history includes Stroke in his father and paternal grandmother.    Social History   reports that he has never smoked. He has never used smokeless tobacco. He reports that he drinks alcohol. He reports that  he does not use drugs.    Medications  Home Medications     Reviewed by Romain Mariano R.N. (Registered Nurse) on 10/11/18 at 0857  Med List Status: Complete   Medication Last Dose Status   acetaminophen (TYLENOL) 500 MG Tab 10/9/2018 Active   cyclobenzaprine (FLEXERIL) 10 MG Tab 10/9/2018 Active   diazePAM (VALIUM) 5 MG Tab 10/8/2018 Active   oxycodone (OXY-IR) 15 MG immediate release tablet 10/9/2018 Active              Current Facility-Administered Medications   Medication Dose Route Frequency Provider Last Rate Last Dose   • potassium chloride SA (Kdur) tablet 40 mEq  40 mEq Oral Once Alannah Yi M.D.       • magnesium sulfate IVPB premix 2 g  2 g Intravenous Once Alannah Yi M.D.       • oxyCODONE immediate release (ROXICODONE) tablet 10 mg  10 mg Oral Q4HRS PRN Alannah Yi M.D.       • electrolyte-A (PLASMALYTE-A) infusion   Intravenous Continuous Terry Estrada M.D. 100 mL/hr at 10/12/18 0555     • senna-docusate (PERICOLACE or SENOKOT S) 8.6-50 MG per tablet 2 Tab  2 Tab Oral BID Dayne Wolf M.D.   2 Tab at 10/12/18 0600    And   • polyethylene glycol/lytes (MIRALAX) PACKET 1 Packet  1 Packet Oral QDAY PRN Dayne Wolf M.D.        And   • magnesium hydroxide (MILK OF MAGNESIA) suspension 30 mL  30 mL Oral QDAY PRN Dayne Wolf M.D.        And   • bisacodyl (DULCOLAX) suppository 10 mg  10 mg Rectal QDAY PRN Dayne Wolf M.D.       • Respiratory Care per Protocol   Nebulization Continuous RT Dayne Wolf M.D.       • MD Alert...ICU Electrolyte Replacement per Pharmacy   Other pharmacy to dose Dayne Wolf M.D.       • piperacillin-tazobactam (ZOSYN) 3.375 g in  mL IVPB  3.375 g Intravenous Q8HRS Terry Estrada M.D. 25 mL/hr at 10/12/18 0423 3.375 g at 10/12/18 0423   • acetaminophen (TYLENOL) tablet 650 mg  650 mg Oral Q4HRS PRN Alannah Yi M.D.   650 mg at 10/11/18 1333       Allergies  No Known  Allergies    Vital Signs last 24 hours  Temp:  [37.8 °C (100 °F)-38.2 °C (100.8 °F)] 37.8 °C (100 °F)  Pulse:  [] 104  Resp:  [3-30] 13    Physical Exam  Physical Exam   Constitutional: He is oriented to person, place, and time. He appears well-developed and well-nourished.   HENT:   Head: Normocephalic and atraumatic.   Eyes: Pupils are equal, round, and reactive to light. Conjunctivae and EOM are normal.   Neck: Normal range of motion. Neck supple. No JVD present. No thyromegaly present.   Cardiovascular: Normal rate, regular rhythm and normal heart sounds.  Exam reveals no gallop and no friction rub.    No murmur heard.  Tachycardic 110, hypotension to systolics 80 which I treated with fluid boluses   Pulmonary/Chest: Effort normal and breath sounds normal. No stridor.   Abdominal: Soft. Bowel sounds are normal.   Musculoskeletal: Normal range of motion. He exhibits no edema, tenderness or deformity.   Neurological: He is alert and oriented to person, place, and time.   On initial exam post op sleepy unable to answer questions from anesthesia but on re-exam woke, alert, appropriate   Skin: Skin is warm and dry.   Psychiatric: He has a normal mood and affect. His behavior is normal.       Fluids    Intake/Output Summary (Last 24 hours) at 10/12/18 0848  Last data filed at 10/12/18 0800   Gross per 24 hour   Intake          2548.33 ml   Output             2990 ml   Net          -441.67 ml       Laboratory  Recent Results (from the past 48 hour(s))   FOLATE    Collection Time: 10/10/18  9:11 AM   Result Value Ref Range    Folate -Folic Acid 10.4 >4.0 ng/mL   VITAMIN B12    Collection Time: 10/10/18  9:11 AM   Result Value Ref Range    Vitamin B12 -True Cobalamin 651 211 - 911 pg/mL   FERRITIN    Collection Time: 10/10/18  9:11 AM   Result Value Ref Range    Ferritin 756.7 (H) 22.0 - 322.0 ng/mL   CBC with Differential    Collection Time: 10/11/18  4:45 AM   Result Value Ref Range    WBC 7.0 4.8 - 10.8 K/uL     RBC 2.75 (L) 4.70 - 6.10 M/uL    Hemoglobin 9.0 (L) 14.0 - 18.0 g/dL    Hematocrit 26.0 (L) 42.0 - 52.0 %    MCV 94.5 81.4 - 97.8 fL    MCH 32.7 27.0 - 33.0 pg    MCHC 34.6 33.7 - 35.3 g/dL    RDW 44.1 35.9 - 50.0 fL    Platelet Count 214 164 - 446 K/uL    MPV 9.9 9.0 - 12.9 fL    Neutrophils-Polys 77.10 (H) 44.00 - 72.00 %    Lymphocytes 6.70 (L) 22.00 - 41.00 %    Monocytes 14.40 (H) 0.00 - 13.40 %    Eosinophils 1.00 0.00 - 6.90 %    Basophils 0.40 0.00 - 1.80 %    Immature Granulocytes 0.40 0.00 - 0.90 %    Nucleated RBC 0.00 /100 WBC    Neutrophils (Absolute) 5.39 1.82 - 7.42 K/uL    Lymphs (Absolute) 0.47 (L) 1.00 - 4.80 K/uL    Monos (Absolute) 1.01 (H) 0.00 - 0.85 K/uL    Eos (Absolute) 0.07 0.00 - 0.51 K/uL    Baso (Absolute) 0.03 0.00 - 0.12 K/uL    Immature Granulocytes (abs) 0.03 0.00 - 0.11 K/uL    NRBC (Absolute) 0.00 K/uL   Basic Metabolic Panel (BMP)    Collection Time: 10/11/18  4:45 AM   Result Value Ref Range    Sodium 128 (L) 135 - 145 mmol/L    Potassium 3.4 (L) 3.6 - 5.5 mmol/L    Chloride 97 96 - 112 mmol/L    Co2 24 20 - 33 mmol/L    Glucose 95 65 - 99 mg/dL    Bun 7 (L) 8 - 22 mg/dL    Creatinine 0.62 0.50 - 1.40 mg/dL    Calcium 7.2 (L) 8.5 - 10.5 mg/dL    Anion Gap 7.0 0.0 - 11.9   Magnesium    Collection Time: 10/11/18  4:45 AM   Result Value Ref Range    Magnesium 1.7 1.5 - 2.5 mg/dL   Phosphorus    Collection Time: 10/11/18  4:45 AM   Result Value Ref Range    Phosphorus 2.8 2.5 - 4.5 mg/dL   ESTIMATED GFR    Collection Time: 10/11/18  4:45 AM   Result Value Ref Range    GFR If African American >60 >60 mL/min/1.73 m 2    GFR If Non African American >60 >60 mL/min/1.73 m 2   RETICULOCYTES COUNT    Collection Time: 10/11/18  4:45 AM   Result Value Ref Range    Reticulocyte Count 1.0 0.8 - 2.1 %    Retic, Absolute 0.03 (L) 0.04 - 0.06 M/uL    Imm. Reticulocyte Fraction 13.0 9.3 - 17.4 %    Retic Hgb Equivalent 23.7 (L) 29.0 - 35.0 pg/cell   BLOOD CULTURE    Collection Time: 10/11/18  8:14  AM   Result Value Ref Range    Significant Indicator POS (POS)     Source BLD     Site PERIPHERAL     Blood Culture (A)      Growth detected by Bactec instrument. 10/12/2018  06:25  Gram Stain: Gram negative rods.     CBC with Differential    Collection Time: 10/12/18  3:41 AM   Result Value Ref Range    WBC 7.0 4.8 - 10.8 K/uL    RBC 2.70 (L) 4.70 - 6.10 M/uL    Hemoglobin 8.9 (L) 14.0 - 18.0 g/dL    Hematocrit 24.8 (L) 42.0 - 52.0 %    MCV 91.9 81.4 - 97.8 fL    MCH 33.0 27.0 - 33.0 pg    MCHC 35.9 (H) 33.7 - 35.3 g/dL    RDW 42.5 35.9 - 50.0 fL    Platelet Count 202 164 - 446 K/uL    MPV 9.6 9.0 - 12.9 fL    Neutrophils-Polys 71.70 44.00 - 72.00 %    Lymphocytes 11.80 (L) 22.00 - 41.00 %    Monocytes 13.40 0.00 - 13.40 %    Eosinophils 2.40 0.00 - 6.90 %    Basophils 0.40 0.00 - 1.80 %    Immature Granulocytes 0.30 0.00 - 0.90 %    Nucleated RBC 0.00 /100 WBC    Neutrophils (Absolute) 5.03 1.82 - 7.42 K/uL    Lymphs (Absolute) 0.83 (L) 1.00 - 4.80 K/uL    Monos (Absolute) 0.94 (H) 0.00 - 0.85 K/uL    Eos (Absolute) 0.17 0.00 - 0.51 K/uL    Baso (Absolute) 0.03 0.00 - 0.12 K/uL    Immature Granulocytes (abs) 0.02 0.00 - 0.11 K/uL    NRBC (Absolute) 0.00 K/uL   Basic Metabolic Panel (BMP)    Collection Time: 10/12/18  3:41 AM   Result Value Ref Range    Sodium 130 (L) 135 - 145 mmol/L    Potassium 3.7 3.6 - 5.5 mmol/L    Chloride 95 (L) 96 - 112 mmol/L    Co2 24 20 - 33 mmol/L    Glucose 82 65 - 99 mg/dL    Bun 6 (L) 8 - 22 mg/dL    Creatinine 0.60 0.50 - 1.40 mg/dL    Calcium 7.7 (L) 8.5 - 10.5 mg/dL    Anion Gap 11.0 0.0 - 11.9   Magnesium    Collection Time: 10/12/18  3:41 AM   Result Value Ref Range    Magnesium 1.8 1.5 - 2.5 mg/dL   Phosphorus    Collection Time: 10/12/18  3:41 AM   Result Value Ref Range    Phosphorus 3.0 2.5 - 4.5 mg/dL   PROCALCITONIN    Collection Time: 10/12/18  3:41 AM   Result Value Ref Range    Procalcitonin 19.15 (H) <0.25 ng/mL   ESTIMATED GFR    Collection Time: 10/12/18  3:41 AM    Result Value Ref Range    GFR If African American >60 >60 mL/min/1.73 m 2    GFR If Non African American >60 >60 mL/min/1.73 m 2       Imaging  CXR OK     Assessment/Plan  * Sepsis (HCC)- (present on admission)   Assessment & Plan    Severe sepsis with lactate elevation, transient hypotension and gm negative bacteremia (report labs from outside hospital). High risk of death and multiorgan failure. My titrated therapy, antibiotics and repeated bedside evaluations today reduce this risk. Continue close observation, surgery monitoring and will take back to OR with major clinical deterioration          Surgical site infection- (present on admission)   Assessment & Plan    To OR with Dr. Manzano for I&D  Continue unasyn and vanco  Gm negative bacteria, coverage broadened by me to Zosyn pending bacteria. Repeat blood cultures today. Most likely source of infection is epidural abscess (similar organism for wound and from blood) and no other source that clinically seems likely        Lumbar disc disease- (present on admission)   Assessment & Plan    Serosanguinous drainage unchanged         Back pain without radiation   Assessment & Plan    Improving today        Hypokalemia   Assessment & Plan    Will replace        Anemia- (present on admission)   Assessment & Plan    Check folate, B12  Monitor postoperatively  No active bleed in last 24 hours        Hyponatremia- (present on admission)   Assessment & Plan    Change to plasmalyte and trend            Discussed patient condition and risk of morbidity and/or mortality with multidisciplinary    The patient remains critically ill.  Critical care time = 50 minutes in directly providing and coordinating critical care and extensive data review.  No time overlap and excludes procedures.

## 2018-10-12 NOTE — PROGRESS NOTES
[facility] ICU Transfer Note                                                                                         ICU Admit Date: 10/10/2018       ICU Discharge Date:  10/12/18        Primary Diagnosis:   Sepsis (HCC)  Gram negative bacteremia      ICU Course Summary (Brief Narrative):    Patient is a 59 y/o male with a PMH of lumbar spinal stenosis, lumbar radiculopathy, s/p trans foraminal lumbar fusion and L2-S1 decompressive laminectomy with foraminotomy on 9/20/2018  presents as a transfer from [facility] with c/o fevers, chills, nausea, vomiting and worsening low back pain.     At [facility]- he was in septic shock and purulent drainage was noted from the surgical site. Blood and wound cx were collected and he received 2lts IVF bolus, unasyn and vanco prior to transfer. Central line was placed but he didn't receive any pressors. CT lumbar spine showed epidural fluid collection/subcutaneous fluid. Transferred to [facility] for neurosurgical consultation.    In our ED- he was SIRS 3/4, was hypotensive which improved with fluids per sepsis protocol. No leukocytosis/ lactic acidosis noted but procalcitonin was elevated at 51. U/A was wnl and CXR showed no acute changes. EDP consulted orthopedic surgery and was transferred to ICU for close monitoring of hypotension and possible pressor support.    ICU course- s/p I&D with complex wound closure on 10/10 by Dr. Sanders. He was initially started on unasyn and vancomycin. Blood cx showed non lactose fermenting gram negative rods and wound cx showed proteus and he was transitioned to zosyn (on 10/10). He was noted to be hypotensive initially, which didn't improve with fluid boluses and thus required levophed on and off for a few hours . He is off pressors for >24hrs now and central line was removed prior to transfer. He is still tachycardic- anushkaley 2/2 pain- on roxicodone 15mg q3hrs PRN for pain control. Surgery on board- no plan for repeat I&D as of now as patient  improving. Recommended daily dressing changes- wound consult placed. CBC showed normocytic anemia, likely post surgical. Hypokalemia and hypomagnesemia monitored and repleted.. PT on board- follow recs for discharge planning.       Important Events in the ICU:   - Central Line: Yes, removed 10/12  - Intubation: Yes- for procedure , extubated post procedure   - Pressors: Yes-levophed, off pressors since 10/11  - Gilbert catheter: Yes, to be removed prior to transfer  - Tube feeding: None  - Antibiotics: Zosyn, started 10/10  - Other procedures: I&D with wound closure 10/10        Labs and imaging studies to be continued with their indications:  - CBC: Yes, to monitor anemia  - CMP or BMP: Yes , to monitor hyponatremia and K  - Magnesium: Yes  - Phosphorus: No   - Chest Xray: No; unless having worsening respiratory status  - Other studies: None         Things to follow:   - F/u final cx and sensitivities and deescalate abx as needed, currently on Zosyn (started 10/10)  - Monitor and replete electrolytes as needed  - F/u orthopedic surgery recs- Dr. Sanders on board  - Needs daily dressing changes- wound consult placed  - Consider ID consult for ?hardware removal after completing treatment for G-ve bacteremia  - F/u PT recs for discharge planning- consult placed

## 2018-10-13 LAB
ANION GAP SERPL CALC-SCNC: 9 MMOL/L (ref 0–11.9)
BACTERIA BLD CULT: ABNORMAL
BACTERIA SPEC ANAEROBE CULT: NORMAL
BASOPHILS # BLD AUTO: 0 % (ref 0–1.8)
BASOPHILS # BLD: 0 K/UL (ref 0–0.12)
BUN SERPL-MCNC: 7 MG/DL (ref 8–22)
CALCIUM SERPL-MCNC: 7.6 MG/DL (ref 8.5–10.5)
CHLORIDE SERPL-SCNC: 94 MMOL/L (ref 96–112)
CO2 SERPL-SCNC: 24 MMOL/L (ref 20–33)
CREAT SERPL-MCNC: 0.59 MG/DL (ref 0.5–1.4)
EOSINOPHIL # BLD AUTO: 0.14 K/UL (ref 0–0.51)
EOSINOPHIL NFR BLD: 1.8 % (ref 0–6.9)
ERYTHROCYTE [DISTWIDTH] IN BLOOD BY AUTOMATED COUNT: 41.8 FL (ref 35.9–50)
GLUCOSE SERPL-MCNC: 111 MG/DL (ref 65–99)
HCT VFR BLD AUTO: 26 % (ref 42–52)
HGB BLD-MCNC: 9.3 G/DL (ref 14–18)
HYPOCHROMIA BLD QL SMEAR: ABNORMAL
LYMPHOCYTES # BLD AUTO: 0.77 K/UL (ref 1–4.8)
LYMPHOCYTES NFR BLD: 9.6 % (ref 22–41)
MACROCYTES BLD QL SMEAR: ABNORMAL
MAGNESIUM SERPL-MCNC: 2.2 MG/DL (ref 1.5–2.5)
MANUAL DIFF BLD: NORMAL
MCH RBC QN AUTO: 32.7 PG (ref 27–33)
MCHC RBC AUTO-ENTMCNC: 35.8 G/DL (ref 33.7–35.3)
MCV RBC AUTO: 91.5 FL (ref 81.4–97.8)
MONOCYTES # BLD AUTO: 0.98 K/UL (ref 0–0.85)
MONOCYTES NFR BLD AUTO: 12.3 % (ref 0–13.4)
MORPHOLOGY BLD-IMP: NORMAL
NEUTROPHILS # BLD AUTO: 6.1 K/UL (ref 1.82–7.42)
NEUTROPHILS NFR BLD: 76.3 % (ref 44–72)
NRBC # BLD AUTO: 0 K/UL
NRBC BLD-RTO: 0 /100 WBC
OVALOCYTES BLD QL SMEAR: NORMAL
PHOSPHATE SERPL-MCNC: 3.3 MG/DL (ref 2.5–4.5)
PLATELET # BLD AUTO: 227 K/UL (ref 164–446)
PLATELET BLD QL SMEAR: NORMAL
PMV BLD AUTO: 9.6 FL (ref 9–12.9)
POTASSIUM SERPL-SCNC: 3.9 MMOL/L (ref 3.6–5.5)
RBC # BLD AUTO: 2.84 M/UL (ref 4.7–6.1)
RBC BLD AUTO: PRESENT
SIGNIFICANT IND 70042: ABNORMAL
SIGNIFICANT IND 70042: NORMAL
SITE SITE: ABNORMAL
SITE SITE: NORMAL
SODIUM SERPL-SCNC: 127 MMOL/L (ref 135–145)
SOURCE SOURCE: ABNORMAL
SOURCE SOURCE: NORMAL
WBC # BLD AUTO: 8 K/UL (ref 4.8–10.8)

## 2018-10-13 PROCEDURE — 700102 HCHG RX REV CODE 250 W/ 637 OVERRIDE(OP): Performed by: STUDENT IN AN ORGANIZED HEALTH CARE EDUCATION/TRAINING PROGRAM

## 2018-10-13 PROCEDURE — 770022 HCHG ROOM/CARE - ICU (200)

## 2018-10-13 PROCEDURE — 84100 ASSAY OF PHOSPHORUS: CPT

## 2018-10-13 PROCEDURE — 83735 ASSAY OF MAGNESIUM: CPT

## 2018-10-13 PROCEDURE — 85027 COMPLETE CBC AUTOMATED: CPT

## 2018-10-13 PROCEDURE — 99232 SBSQ HOSP IP/OBS MODERATE 35: CPT | Performed by: INTERNAL MEDICINE

## 2018-10-13 PROCEDURE — 700105 HCHG RX REV CODE 258: Performed by: INTERNAL MEDICINE

## 2018-10-13 PROCEDURE — 700111 HCHG RX REV CODE 636 W/ 250 OVERRIDE (IP)

## 2018-10-13 PROCEDURE — A9270 NON-COVERED ITEM OR SERVICE: HCPCS | Performed by: STUDENT IN AN ORGANIZED HEALTH CARE EDUCATION/TRAINING PROGRAM

## 2018-10-13 PROCEDURE — 80048 BASIC METABOLIC PNL TOTAL CA: CPT

## 2018-10-13 PROCEDURE — 85007 BL SMEAR W/DIFF WBC COUNT: CPT

## 2018-10-13 PROCEDURE — 700111 HCHG RX REV CODE 636 W/ 250 OVERRIDE (IP): Performed by: INTERNAL MEDICINE

## 2018-10-13 RX ORDER — SODIUM CHLORIDE 9 MG/ML
INJECTION, SOLUTION INTRAVENOUS CONTINUOUS
Status: DISCONTINUED | OUTPATIENT
Start: 2018-10-13 | End: 2018-10-13

## 2018-10-13 RX ORDER — ONDANSETRON 2 MG/ML
INJECTION INTRAMUSCULAR; INTRAVENOUS
Status: COMPLETED
Start: 2018-10-13 | End: 2018-10-13

## 2018-10-13 RX ORDER — CALCIUM CARBONATE 500 MG/1
500 TABLET, CHEWABLE ORAL DAILY
Status: DISCONTINUED | OUTPATIENT
Start: 2018-10-13 | End: 2018-10-14 | Stop reason: HOSPADM

## 2018-10-13 RX ORDER — ONDANSETRON 2 MG/ML
4 INJECTION INTRAMUSCULAR; INTRAVENOUS EVERY 4 HOURS PRN
Status: DISCONTINUED | OUTPATIENT
Start: 2018-10-13 | End: 2018-10-14 | Stop reason: HOSPADM

## 2018-10-13 RX ADMIN — PIPERACILLIN AND TAZOBACTAM 3.38 G: 3; .375 INJECTION, POWDER, LYOPHILIZED, FOR SOLUTION INTRAVENOUS; PARENTERAL at 04:10

## 2018-10-13 RX ADMIN — SENNOSIDES AND DOCUSATE SODIUM 2 TABLET: 8.6; 5 TABLET ORAL at 18:00

## 2018-10-13 RX ADMIN — AMPICILLIN SODIUM 2000 MG: 2 INJECTION, POWDER, FOR SOLUTION INTRAMUSCULAR; INTRAVENOUS at 12:46

## 2018-10-13 RX ADMIN — ONDANSETRON 4 MG: 2 INJECTION INTRAMUSCULAR; INTRAVENOUS at 15:00

## 2018-10-13 RX ADMIN — OXYCODONE HYDROCHLORIDE 15 MG: 5 TABLET ORAL at 11:07

## 2018-10-13 RX ADMIN — SENNOSIDES AND DOCUSATE SODIUM 2 TABLET: 8.6; 5 TABLET ORAL at 04:08

## 2018-10-13 RX ADMIN — ONDANSETRON HYDROCHLORIDE 4 MG: 2 INJECTION, SOLUTION INTRAMUSCULAR; INTRAVENOUS at 15:00

## 2018-10-13 RX ADMIN — OXYCODONE HYDROCHLORIDE 15 MG: 5 TABLET ORAL at 08:18

## 2018-10-13 RX ADMIN — AMPICILLIN SODIUM 2000 MG: 2 INJECTION, POWDER, FOR SOLUTION INTRAMUSCULAR; INTRAVENOUS at 18:00

## 2018-10-13 RX ADMIN — ANTACID TABLETS 500 MG: 500 TABLET, CHEWABLE ORAL at 08:30

## 2018-10-13 RX ADMIN — OXYCODONE HYDROCHLORIDE 15 MG: 5 TABLET ORAL at 15:13

## 2018-10-13 RX ADMIN — AMPICILLIN SODIUM 2000 MG: 2 INJECTION, POWDER, FOR SOLUTION INTRAMUSCULAR; INTRAVENOUS at 23:31

## 2018-10-13 RX ADMIN — OXYCODONE HYDROCHLORIDE 15 MG: 5 TABLET ORAL at 18:19

## 2018-10-13 RX ADMIN — OXYCODONE HYDROCHLORIDE 15 MG: 5 TABLET ORAL at 04:08

## 2018-10-13 RX ADMIN — OXYCODONE HYDROCHLORIDE 15 MG: 5 TABLET ORAL at 00:11

## 2018-10-13 RX ADMIN — OXYCODONE HYDROCHLORIDE 15 MG: 5 TABLET ORAL at 21:31

## 2018-10-13 ASSESSMENT — ENCOUNTER SYMPTOMS
MUSCULOSKELETAL NEGATIVE: 1
FEVER: 0
FATIGUE: 0
DIZZINESS: 0
ALLERGIC/IMMUNOLOGIC NEGATIVE: 1
EYES NEGATIVE: 1
HEMATOLOGIC/LYMPHATIC NEGATIVE: 1
SHORTNESS OF BREATH: 0
BACK PAIN: 1
CONSTIPATION: 1
RESPIRATORY NEGATIVE: 1
NAUSEA: 0
COUGH: 0
BLURRED VISION: 0
CARDIOVASCULAR NEGATIVE: 1
GASTROINTESTINAL NEGATIVE: 1
DIAPHORESIS: 0
NEUROLOGICAL NEGATIVE: 1
ENDOCRINE NEGATIVE: 1
CHILLS: 0
VOMITING: 0

## 2018-10-13 ASSESSMENT — PAIN SCALES - GENERAL
PAINLEVEL_OUTOF10: 7
PAINLEVEL_OUTOF10: 6
PAINLEVEL_OUTOF10: 8
PAINLEVEL_OUTOF10: 7
PAINLEVEL_OUTOF10: 7
PAINLEVEL_OUTOF10: 8
PAINLEVEL_OUTOF10: 6
PAINLEVEL_OUTOF10: 7
PAINLEVEL_OUTOF10: 6

## 2018-10-13 NOTE — PROGRESS NOTES
[facility] Interval Note    Name Tomás Urrutia     1960   Age/Sex 58 y.o. male   MRN [id number]   Code Status Full     After 5PM or if no immediate response to page, please call for cross-coverage  Attending/Team: Dr. Estrada / ISAIAS SPICER Call [phone] to page   Resident:   Dr. Yi          Chief complaint/ reason for interval visit (Primary Diagnosis)   Patient is a 59 y/o male with a PMH of low back pain s/p lumbar fusion on 18 presented as a transfer from [facility] for further evaluation of sepsis 2/2 surgical wound infection. Central line was placed prior to transfer for hypotension. CT at OSH showed an epidural collection. Patient received fluid resuscitation and empiric antibiotic therapy with vancomycin and zosyn. S/p I&D with complex wound closure (10/10)    Interval Problem Daily Status Update    Reports pain is well controlled at the surgical site  Walking around in the hallway  Blood cx showed  proteus  Wound cx showed proteus- pansensitive  Changed zosyn to IV ampicillin, plan to changed to po on Monday  Surgery on board- No plan for 2nd I&D as patient improving, qday dressing changes      Review of Systems   Constitutional: Negative for chills and fever.   Eyes: Negative for blurred vision.   Respiratory: Negative for cough and shortness of breath.    Cardiovascular: Negative for chest pain and leg swelling.   Gastrointestinal: Positive for constipation. Negative for nausea and vomiting.   Musculoskeletal: Positive for back pain.   Neurological: Negative for dizziness.       Consultants/Specialty  Orthopedic surgery     Disposition  ICU, stable for transfer to floor    Quality Measures  Quality-Core Measures   Reviewed items::  Labs reviewed, EKG reviewed, Radiology images reviewed and Medications reviewed  DVT prophylaxis - mechanical:  SCDs  Ulcer Prophylaxis::  Not indicated  Antibiotics:  Treating active infection/contamination  beyond 24 hours perioperative coverage      Physical Exam       Vitals:    10/13/18 0408 10/13/18 0500 10/13/18 0600 10/13/18 0700   BP:       Pulse: 96 99 97 95   Resp: 15 17 16 16   Temp:       SpO2: 99% 99% 100% 97%   Weight: 78 kg (171 lb 15.3 oz)      Height:         Body mass index is 23.98 kg/m². Weight: 78 kg (171 lb 15.3 oz)  Oxygen Therapy:  Pulse Oximetry: 97 %, O2 (LPM): 2, O2 Delivery: Silicone Nasal Cannula    Physical Exam   Constitutional: He is oriented to person, place, and time and well-developed, well-nourished, and in no distress. No distress.   Cardiovascular: Regular rhythm and normal heart sounds.    Tachycardia    Pulmonary/Chest: Effort normal and breath sounds normal. No respiratory distress.   Abdominal: Soft. Bowel sounds are normal. There is no tenderness.   Musculoskeletal: He exhibits tenderness.   Mild discharge on dressing on the back   Wound vac draining serosanguinous discharge     Neurological: He is alert and oriented to person, place, and time.   Skin: He is not diaphoretic.   Psychiatric: Affect normal.         Lab Data Review:      10/11/2018  7:28 AM    Recent Labs      10/11/18   0445  10/12/18   0341  10/13/18   0400   SODIUM  128*  130*  127*   POTASSIUM  3.4*  3.7  3.9   CHLORIDE  97  95*  94*   CO2  24  24  24   BUN  7*  6*  7*   CREATININE  0.62  0.60  0.59   MAGNESIUM  1.7  1.8  2.2   PHOSPHORUS  2.8  3.0  3.3   CALCIUM  7.2*  7.7*  7.6*       Recent Labs      10/11/18   0445  10/12/18   0341  10/13/18   0400   GLUCOSE  95  82  111*       Recent Labs      10/11/18   0445  10/12/18   0341  10/13/18   0400   RBC  2.75*  2.70*  2.84*   HEMOGLOBIN  9.0*  8.9*  9.3*   HEMATOCRIT  26.0*  24.8*  26.0*   PLATELETCT  214  202  227       Recent Labs      10/11/18   0445  10/12/18   0341  10/13/18   0400   WBC  7.0  7.0  8.0   NEUTSPOLYS  77.10*  71.70  76.30*   LYMPHOCYTES  6.70*  11.80*  9.60*   MONOCYTES  14.40*  13.40  12.30   EOSINOPHILS  1.00  2.40  1.80   BASOPHILS  0.40   0.40  0.00           Assessment/Plan     * Sepsis (HCC)- (present on admission)   Assessment & Plan    This is sepsis (without associated acute organ dysfunction).   SIRS 3/4 (HR, RR and Temp)  qSOFA 2/3 (RR and SBP)  S/p fluid resuscitation and empiric antibiotic therapy on admission   CT scan at OSH showed epidural abscess s/p I&D on admission by Dr. Manzano (10/10)  Blood Cx at OSH grew GNR   Blood Cx: proteus  Wound Cx: Proteus   Plan:  - Changed abx from zosyn to IV ampicillin  - Follow cultures and sensitivities- deescalate abx based on results   - Follow surgery rec's (Dr. Rose)   - PRN pain meds         Surgical site infection- (present on admission)   Assessment & Plan    Mid line lumbar spine surgical site erythema, edema and purulent secretion  S/p I&D and wound closure by Dr. Manzano (10/10)  Blood Cx: Proteus   Wound Cx: Proteus- pansensitive  Changed zosyn to IV ampicillin, plan to change to PO on monday  Orthopedic surgery (Dr. Sanders) is following- no plan for 2nd I&D as pt clinically improving   Monitor drain output  Daily dressing changes, wound consult placed  PT on board  To consider ID consult on whether hardware has to be removed- not an urgent indication per surgery          Hypomagnesemia   Assessment & Plan    Replete as needed         Hypokalemia   Assessment & Plan    Replete as needed         Anemia- (present on admission)   Assessment & Plan    Normocytic anemia  Inflammatory vs post-surgical anemia   CTM        Hyponatremia- (present on admission)   Assessment & Plan    Improving  Likely 2/2 SIADH (recent surgery and pain)  Fluid restriction  CTM

## 2018-10-13 NOTE — CARE PLAN
Problem: Safety  Goal: Will remain free from falls  Outcome: PROGRESSING AS EXPECTED  High fall risk precautions in place. Pt encouraged to use call light before getting out of bed. Pt verbalized understanding and able to return demonstrate how to use call light. Bed alarm on.     Problem: Infection  Goal: Will remain free from infection  Outcome: PROGRESSING AS EXPECTED  Standard precautions in place with every patient interaction. Frequent handwashing and foaming utilized to prevent spread of infection to patient or staff members.

## 2018-10-13 NOTE — PROGRESS NOTES
Critical Care Progress Note    Date of admission  10/10/2018    Chief Complaint  58 y.o. male admitted 10/10/2018 with septic shock secondary epidural abscess with proteus bacteremia    Hospital Course   Abscess drainage by ortho, pip/tazo, initiatly received some pressors, fluids boluses  Interval Problem Update  Reviewed last 24 hour events:  Walking in hallways  Eating well  Passing gas but no bowel movement  Pain in back under better control  Proteus in wound pan-sensitive  Minimal drainage from wound drain (clearing)    Review of Systems  Review of Systems   Constitutional: Negative for chills, diaphoresis, fatigue and fever.   HENT: Negative.    Eyes: Negative.    Respiratory: Negative.    Cardiovascular: Negative.    Gastrointestinal: Negative.    Endocrine: Negative.    Genitourinary: Negative.    Musculoskeletal: Negative.    Skin: Negative.    Allergic/Immunologic: Negative.    Neurological: Negative.    Hematological: Negative.         Vital Signs for last 24 hours   Temp:  [36.8 °C (98.3 °F)-37.4 °C (99.4 °F)] 36.8 °C (98.3 °F)  Pulse:  [] 95  Resp:  [11-30] 16    Hemodynamic parameters for last 24 hours  CVP:  [221 MM HG] 221 MM HG    Vent Settings for last 24 hours       Physical Exam   Physical Exam   Constitutional: He is oriented to person, place, and time. He appears well-developed and well-nourished.   HENT:   Head: Normocephalic and atraumatic.   Eyes: Pupils are equal, round, and reactive to light. Conjunctivae and EOM are normal.   Neck: Normal range of motion. Neck supple.   Cardiovascular: Normal rate and regular rhythm.    Pulmonary/Chest: Effort normal and breath sounds normal.   Abdominal: Soft. Bowel sounds are normal.   Musculoskeletal: Normal range of motion.   Neurological: He is alert and oriented to person, place, and time.   Skin: Skin is warm and dry.   Psychiatric: He has a normal mood and affect. His behavior is normal. Thought content normal.       Medications  Current  Facility-Administered Medications   Medication Dose Route Frequency Provider Last Rate Last Dose   • oxyCODONE immediate-release (ROXICODONE) tablet 15 mg  15 mg Oral Q3HRS PRN Alannah Yi M.D.   15 mg at 10/13/18 0408   • senna-docusate (PERICOLACE or SENOKOT S) 8.6-50 MG per tablet 2 Tab  2 Tab Oral BID Dayne Wolf M.D.   2 Tab at 10/13/18 0408    And   • polyethylene glycol/lytes (MIRALAX) PACKET 1 Packet  1 Packet Oral QDAY PRN Dayne Wolf M.D.        And   • magnesium hydroxide (MILK OF MAGNESIA) suspension 30 mL  30 mL Oral QDAY PRN Dayne Wolf M.D.        And   • bisacodyl (DULCOLAX) suppository 10 mg  10 mg Rectal QDAY PRN Dayne Wolf M.D.       • Respiratory Care per Protocol   Nebulization Continuous RT Dayne Wolf M.D.       • MD Alert...ICU Electrolyte Replacement per Pharmacy   Other pharmacy to dose Dayne Wolf M.D.       • piperacillin-tazobactam (ZOSYN) 3.375 g in  mL IVPB  3.375 g Intravenous Q8HRS Terry Estrada M.D.   Stopped at 10/13/18 0810   • acetaminophen (TYLENOL) tablet 650 mg  650 mg Oral Q4HRS PRN Alannah Yi M.D.   650 mg at 10/11/18 1333       Fluids    Intake/Output Summary (Last 24 hours) at 10/13/18 0757  Last data filed at 10/13/18 0422   Gross per 24 hour   Intake              940 ml   Output             1930 ml   Net             -990 ml       Laboratory  Recent Results (from the past 48 hour(s))   BLOOD CULTURE    Collection Time: 10/11/18  7:58 AM   Result Value Ref Range    Significant Indicator NEG     Source BLD     Site PERIPHERAL     Blood Culture       No Growth    Note: Blood cultures are incubated for 5 days and  are monitored continuously.Positive blood cultures  are called to the RN and reported as soon as  they are identified.     BLOOD CULTURE    Collection Time: 10/11/18  8:14 AM   Result Value Ref Range    Significant Indicator POS (POS)     Source BLD     Site  PERIPHERAL     Blood Culture (A)      Growth detected by Bactec instrument. 10/12/2018  06:25  Gram Stain: Gram negative rods.     CBC with Differential    Collection Time: 10/12/18  3:41 AM   Result Value Ref Range    WBC 7.0 4.8 - 10.8 K/uL    RBC 2.70 (L) 4.70 - 6.10 M/uL    Hemoglobin 8.9 (L) 14.0 - 18.0 g/dL    Hematocrit 24.8 (L) 42.0 - 52.0 %    MCV 91.9 81.4 - 97.8 fL    MCH 33.0 27.0 - 33.0 pg    MCHC 35.9 (H) 33.7 - 35.3 g/dL    RDW 42.5 35.9 - 50.0 fL    Platelet Count 202 164 - 446 K/uL    MPV 9.6 9.0 - 12.9 fL    Neutrophils-Polys 71.70 44.00 - 72.00 %    Lymphocytes 11.80 (L) 22.00 - 41.00 %    Monocytes 13.40 0.00 - 13.40 %    Eosinophils 2.40 0.00 - 6.90 %    Basophils 0.40 0.00 - 1.80 %    Immature Granulocytes 0.30 0.00 - 0.90 %    Nucleated RBC 0.00 /100 WBC    Neutrophils (Absolute) 5.03 1.82 - 7.42 K/uL    Lymphs (Absolute) 0.83 (L) 1.00 - 4.80 K/uL    Monos (Absolute) 0.94 (H) 0.00 - 0.85 K/uL    Eos (Absolute) 0.17 0.00 - 0.51 K/uL    Baso (Absolute) 0.03 0.00 - 0.12 K/uL    Immature Granulocytes (abs) 0.02 0.00 - 0.11 K/uL    NRBC (Absolute) 0.00 K/uL   Basic Metabolic Panel (BMP)    Collection Time: 10/12/18  3:41 AM   Result Value Ref Range    Sodium 130 (L) 135 - 145 mmol/L    Potassium 3.7 3.6 - 5.5 mmol/L    Chloride 95 (L) 96 - 112 mmol/L    Co2 24 20 - 33 mmol/L    Glucose 82 65 - 99 mg/dL    Bun 6 (L) 8 - 22 mg/dL    Creatinine 0.60 0.50 - 1.40 mg/dL    Calcium 7.7 (L) 8.5 - 10.5 mg/dL    Anion Gap 11.0 0.0 - 11.9   Magnesium    Collection Time: 10/12/18  3:41 AM   Result Value Ref Range    Magnesium 1.8 1.5 - 2.5 mg/dL   Phosphorus    Collection Time: 10/12/18  3:41 AM   Result Value Ref Range    Phosphorus 3.0 2.5 - 4.5 mg/dL   PROCALCITONIN    Collection Time: 10/12/18  3:41 AM   Result Value Ref Range    Procalcitonin 19.15 (H) <0.25 ng/mL   ESTIMATED GFR    Collection Time: 10/12/18  3:41 AM   Result Value Ref Range    GFR If African American >60 >60 mL/min/1.73 m 2    GFR If Non  African American >60 >60 mL/min/1.73 m 2   CBC with Differential    Collection Time: 10/13/18  4:00 AM   Result Value Ref Range    WBC 8.0 4.8 - 10.8 K/uL    RBC 2.84 (L) 4.70 - 6.10 M/uL    Hemoglobin 9.3 (L) 14.0 - 18.0 g/dL    Hematocrit 26.0 (L) 42.0 - 52.0 %    MCV 91.5 81.4 - 97.8 fL    MCH 32.7 27.0 - 33.0 pg    MCHC 35.8 (H) 33.7 - 35.3 g/dL    RDW 41.8 35.9 - 50.0 fL    Platelet Count 227 164 - 446 K/uL    MPV 9.6 9.0 - 12.9 fL    Nucleated RBC 0.00 /100 WBC    NRBC (Absolute) 0.00 K/uL    Neutrophils-Polys 76.30 (H) 44.00 - 72.00 %    Lymphocytes 9.60 (L) 22.00 - 41.00 %    Monocytes 12.30 0.00 - 13.40 %    Eosinophils 1.80 0.00 - 6.90 %    Basophils 0.00 0.00 - 1.80 %    Neutrophils (Absolute) 6.10 1.82 - 7.42 K/uL    Lymphs (Absolute) 0.77 (L) 1.00 - 4.80 K/uL    Monos (Absolute) 0.98 (H) 0.00 - 0.85 K/uL    Eos (Absolute) 0.14 0.00 - 0.51 K/uL    Baso (Absolute) 0.00 0.00 - 0.12 K/uL    Hypochromia 1+     Macrocytosis 1+    Basic Metabolic Panel (BMP)    Collection Time: 10/13/18  4:00 AM   Result Value Ref Range    Sodium 127 (L) 135 - 145 mmol/L    Potassium 3.9 3.6 - 5.5 mmol/L    Chloride 94 (L) 96 - 112 mmol/L    Co2 24 20 - 33 mmol/L    Glucose 111 (H) 65 - 99 mg/dL    Bun 7 (L) 8 - 22 mg/dL    Creatinine 0.59 0.50 - 1.40 mg/dL    Calcium 7.6 (L) 8.5 - 10.5 mg/dL    Anion Gap 9.0 0.0 - 11.9   Magnesium    Collection Time: 10/13/18  4:00 AM   Result Value Ref Range    Magnesium 2.2 1.5 - 2.5 mg/dL   Phosphorus    Collection Time: 10/13/18  4:00 AM   Result Value Ref Range    Phosphorus 3.3 2.5 - 4.5 mg/dL   ESTIMATED GFR    Collection Time: 10/13/18  4:00 AM   Result Value Ref Range    GFR If African American >60 >60 mL/min/1.73 m 2    GFR If Non African American >60 >60 mL/min/1.73 m 2   PERIPHERAL SMEAR REVIEW    Collection Time: 10/13/18  4:00 AM   Result Value Ref Range    Peripheral Smear Review see below    PLATELET ESTIMATE    Collection Time: 10/13/18  4:00 AM   Result Value Ref Range     Plt Estimation Normal    MORPHOLOGY    Collection Time: 10/13/18  4:00 AM   Result Value Ref Range    RBC Morphology Present     Ovalocytes 1+    DIFFERENTIAL MANUAL    Collection Time: 10/13/18  4:00 AM   Result Value Ref Range    Manual Diff Status PERFORMED        Imaging      Assessment/Plan  * Sepsis (HCC)- (present on admission)   Assessment & Plan    Severe sepsis with lactate elevation, transient hypotension and gm negative bacteremia (report labs from outside hospital). High risk of death and multiorgan failure. My titrated therapy, antibiotics and repeated bedside evaluations today reduce this risk. Continue close observation, surgery monitoring and will take back to OR with major clinical deterioration          Surgical site infection- (present on admission)   Assessment & Plan    To OR with Dr. Manzano for I&D  Continue unasyn and vanco  Gm negative bacteria, coverage broadened by me to Zosyn pending bacteria. Repeat blood cultures today. Most likely source of infection is epidural abscess (similar organism for wound and from blood) and no other source that clinically seems likely        Lumbar disc disease   Assessment & Plan    Serosanguinous drainage unchanged         Back pain without radiation   Assessment & Plan    Improving today        Hypokalemia   Assessment & Plan    NO longer active issue        Anemia- (present on admission)   Assessment & Plan    Check folate, B12  Monitor postoperatively  No active bleed in last 24 hours    Stable             VTE:  Lovenox  Ulcer: Not Indicated  Lines: None    I have performed a physical exam and reviewed and updated ROS and Plan today (10/13/2018). In review of yesterday's note (10/12/2018), there are no changes except as documented above.     Discussed patient condition and risk of morbidity and/or mortality with Hospitalist, Family, RN, RT, Therapies and Pharmacy  The patient is no longer critically and may be discharged to home within the next several  days

## 2018-10-13 NOTE — WOUND TEAM
Wound Consult received for incisional wound on mid to lower back. Island dressing carefully pulled back. Incision assessed, no open surgical wound for Wound Team to follow. Pt w/ hemovac drain in place. Incisional site is clean, periwound is slightly pink, but intact. No erythema noted at this time. No advanced wound care needs at this time.

## 2018-10-13 NOTE — PROGRESS NOTES
Progress Note               Author: Romain Sanders Date & Time created: 10/13/2018  2:33 PM     Interval History:  Pt continues to improve  Review of Systems:  ROS    Physical Exam:  Physical Exam   Neurological:   Sensation and motor intact  Hemovac drainage serosanguinous  Wound clean no erythema       Labs:        Invalid input(s): [id number]      Recent Labs      10/11/18   0445  10/12/18   0341  10/13/18   0400   SODIUM  128*  130*  127*   POTASSIUM  3.4*  3.7  3.9   CHLORIDE  97  95*  94*   CO2  24  24  24   BUN  7*  6*  7*   CREATININE  0.62  0.60  0.59   MAGNESIUM  1.7  1.8  2.2   PHOSPHORUS  2.8  3.0  3.3   CALCIUM  7.2*  7.7*  7.6*     Recent Labs      10/11/18   0445  10/12/18   0341  10/13/18   0400   GLUCOSE  95  82  111*     Recent Labs      10/11/18   0445  10/12/18   0341  10/13/18   0400   RBC  2.75*  2.70*  2.84*   HEMOGLOBIN  9.0*  8.9*  9.3*   HEMATOCRIT  26.0*  24.8*  26.0*   PLATELETCT  214  202  227     Recent Labs      10/11/18   0445  10/12/18   0341  10/13/18   0400   WBC  7.0  7.0  8.0   NEUTSPOLYS  77.10*  71.70  76.30*   LYMPHOCYTES  6.70*  11.80*  9.60*   MONOCYTES  14.40*  13.40  12.30   EOSINOPHILS  1.00  2.40  1.80   BASOPHILS  0.40  0.40  0.00     Hemodynamics:  Temp (24hrs), Av.1 °C (98.8 °F), Min:36.8 °C (98.3 °F), Max:37.4 °C (99.4 °F)  Temperature: 36.8 °C (98.3 °F)  Pulse  Av.8  Min: 42  Max: 127Heart Rate (Monitored): 95  NIBP: 118/74    Respiratory:    Respiration: 16, Pulse Oximetry: 97 %     Work Of Breathing / Effort: Mild  RUL Breath Sounds: Clear, RML Breath Sounds: Clear, RLL Breath Sounds: Diminished, JLUIS Breath Sounds: Clear, LLL Breath Sounds: Diminished  Fluids:    Intake/Output Summary (Last 24 hours) at 10/13/18 1433  Last data filed at 10/13/18 0800   Gross per 24 hour   Intake              500 ml   Output             1040 ml   Net             -540 ml     Weight: 78 kg (171 lb 15.3 oz)  GI/Nutrition:  Orders Placed This Encounter   Procedures   •  Diet Order Regular     Standing Status:   Standing     Number of Occurrences:   1     Order Specific Question:   Diet:     Answer:   Regular [1]     Medical Decision Making, by Problem:  Active Hospital Problems    Diagnosis   • *Sepsis (HCC) [A41.9]   • Surgical site infection [T81.49XA]   • Hypokalemia [E87.6]   • Hypomagnesemia [E83.42]   • Hyponatremia [E87.1]   • Anemia [D64.9]       Plan:  Continue to follow  Will keep drain as long as possible  Pt/ot  No new recs at this time    Quality-Core Measures

## 2018-10-14 VITALS
BODY MASS INDEX: 23.92 KG/M2 | DIASTOLIC BLOOD PRESSURE: 82 MMHG | TEMPERATURE: 98.4 F | HEIGHT: 71 IN | WEIGHT: 170.86 LBS | RESPIRATION RATE: 16 BRPM | OXYGEN SATURATION: 97 % | HEART RATE: 78 BPM | SYSTOLIC BLOOD PRESSURE: 112 MMHG

## 2018-10-14 PROBLEM — B96.89 BACTEREMIA DUE TO OTHER BACTERIA: Status: ACTIVE | Noted: 2018-10-14

## 2018-10-14 PROBLEM — R78.81 BACTEREMIA DUE TO OTHER BACTERIA: Status: ACTIVE | Noted: 2018-10-14

## 2018-10-14 LAB
ANION GAP SERPL CALC-SCNC: 10 MMOL/L (ref 0–11.9)
BASOPHILS # BLD AUTO: 0.5 % (ref 0–1.8)
BASOPHILS # BLD: 0.04 K/UL (ref 0–0.12)
BUN SERPL-MCNC: 6 MG/DL (ref 8–22)
CALCIUM SERPL-MCNC: 8.4 MG/DL (ref 8.5–10.5)
CHLORIDE SERPL-SCNC: 96 MMOL/L (ref 96–112)
CO2 SERPL-SCNC: 25 MMOL/L (ref 20–33)
CREAT SERPL-MCNC: 0.68 MG/DL (ref 0.5–1.4)
EOSINOPHIL # BLD AUTO: 0.4 K/UL (ref 0–0.51)
EOSINOPHIL NFR BLD: 4.8 % (ref 0–6.9)
ERYTHROCYTE [DISTWIDTH] IN BLOOD BY AUTOMATED COUNT: 44.4 FL (ref 35.9–50)
GLUCOSE SERPL-MCNC: 108 MG/DL (ref 65–99)
HCT VFR BLD AUTO: 31.2 % (ref 42–52)
HGB BLD-MCNC: 10.4 G/DL (ref 14–18)
IMM GRANULOCYTES # BLD AUTO: 0.07 K/UL (ref 0–0.11)
IMM GRANULOCYTES NFR BLD AUTO: 0.8 % (ref 0–0.9)
LYMPHOCYTES # BLD AUTO: 1.28 K/UL (ref 1–4.8)
LYMPHOCYTES NFR BLD: 15.5 % (ref 22–41)
MCH RBC QN AUTO: 31.2 PG (ref 27–33)
MCHC RBC AUTO-ENTMCNC: 33.3 G/DL (ref 33.7–35.3)
MCV RBC AUTO: 93.7 FL (ref 81.4–97.8)
MONOCYTES # BLD AUTO: 1.04 K/UL (ref 0–0.85)
MONOCYTES NFR BLD AUTO: 12.6 % (ref 0–13.4)
NEUTROPHILS # BLD AUTO: 5.45 K/UL (ref 1.82–7.42)
NEUTROPHILS NFR BLD: 65.8 % (ref 44–72)
NRBC # BLD AUTO: 0 K/UL
NRBC BLD-RTO: 0 /100 WBC
PLATELET # BLD AUTO: 296 K/UL (ref 164–446)
PMV BLD AUTO: 9.2 FL (ref 9–12.9)
POTASSIUM SERPL-SCNC: 3.8 MMOL/L (ref 3.6–5.5)
RBC # BLD AUTO: 3.33 M/UL (ref 4.7–6.1)
SODIUM SERPL-SCNC: 131 MMOL/L (ref 135–145)
WBC # BLD AUTO: 8.3 K/UL (ref 4.8–10.8)

## 2018-10-14 PROCEDURE — A9270 NON-COVERED ITEM OR SERVICE: HCPCS | Performed by: STUDENT IN AN ORGANIZED HEALTH CARE EDUCATION/TRAINING PROGRAM

## 2018-10-14 PROCEDURE — 700105 HCHG RX REV CODE 258: Performed by: INTERNAL MEDICINE

## 2018-10-14 PROCEDURE — A9270 NON-COVERED ITEM OR SERVICE: HCPCS | Performed by: INTERNAL MEDICINE

## 2018-10-14 PROCEDURE — 99238 HOSP IP/OBS DSCHRG MGMT 30/<: CPT | Performed by: INTERNAL MEDICINE

## 2018-10-14 PROCEDURE — 700102 HCHG RX REV CODE 250 W/ 637 OVERRIDE(OP): Performed by: STUDENT IN AN ORGANIZED HEALTH CARE EDUCATION/TRAINING PROGRAM

## 2018-10-14 PROCEDURE — 700111 HCHG RX REV CODE 636 W/ 250 OVERRIDE (IP): Performed by: INTERNAL MEDICINE

## 2018-10-14 PROCEDURE — 85025 COMPLETE CBC W/AUTO DIFF WBC: CPT

## 2018-10-14 PROCEDURE — 700102 HCHG RX REV CODE 250 W/ 637 OVERRIDE(OP): Performed by: INTERNAL MEDICINE

## 2018-10-14 PROCEDURE — 80048 BASIC METABOLIC PNL TOTAL CA: CPT

## 2018-10-14 RX ORDER — OXYCODONE HYDROCHLORIDE 15 MG/1
15 TABLET ORAL EVERY 4 HOURS PRN
Qty: 18 TAB | Refills: 0 | Status: SHIPPED | OUTPATIENT
Start: 2018-10-14 | End: 2018-10-17

## 2018-10-14 RX ORDER — BISACODYL 10 MG
10 SUPPOSITORY, RECTAL RECTAL
Qty: 15 SUPPOSITORY | Refills: 0 | Status: SHIPPED | OUTPATIENT
Start: 2018-10-14 | End: 2019-09-19

## 2018-10-14 RX ORDER — POTASSIUM CHLORIDE 20 MEQ/1
20 TABLET, EXTENDED RELEASE ORAL ONCE
Status: COMPLETED | OUTPATIENT
Start: 2018-10-14 | End: 2018-10-14

## 2018-10-14 RX ORDER — AMOXICILLIN 500 MG/1
500 CAPSULE ORAL 3 TIMES DAILY
Qty: 42 CAP | Refills: 0 | Status: SHIPPED | OUTPATIENT
Start: 2018-10-14 | End: 2018-10-28

## 2018-10-14 RX ADMIN — SENNOSIDES AND DOCUSATE SODIUM 2 TABLET: 8.6; 5 TABLET ORAL at 05:55

## 2018-10-14 RX ADMIN — OXYCODONE HYDROCHLORIDE 15 MG: 5 TABLET ORAL at 12:22

## 2018-10-14 RX ADMIN — POTASSIUM CHLORIDE 20 MEQ: 1500 TABLET, EXTENDED RELEASE ORAL at 08:52

## 2018-10-14 RX ADMIN — OXYCODONE HYDROCHLORIDE 15 MG: 5 TABLET ORAL at 04:17

## 2018-10-14 RX ADMIN — BISACODYL 10 MG: 10 SUPPOSITORY RECTAL at 08:52

## 2018-10-14 RX ADMIN — OXYCODONE HYDROCHLORIDE 15 MG: 5 TABLET ORAL at 00:30

## 2018-10-14 RX ADMIN — AMPICILLIN SODIUM 2000 MG: 2 INJECTION, POWDER, FOR SOLUTION INTRAMUSCULAR; INTRAVENOUS at 05:55

## 2018-10-14 RX ADMIN — ANTACID TABLETS 500 MG: 500 TABLET, CHEWABLE ORAL at 05:55

## 2018-10-14 RX ADMIN — OXYCODONE HYDROCHLORIDE 15 MG: 5 TABLET ORAL at 08:52

## 2018-10-14 ASSESSMENT — PAIN SCALES - GENERAL
PAINLEVEL_OUTOF10: 7
PAINLEVEL_OUTOF10: 6
PAINLEVEL_OUTOF10: 7
PAINLEVEL_OUTOF10: 8

## 2018-10-14 ASSESSMENT — ENCOUNTER SYMPTOMS
NAUSEA: 0
CHILLS: 0
CONSTIPATION: 1
VOMITING: 0
FATIGUE: 0
COUGH: 0
EYES NEGATIVE: 1
DIAPHORESIS: 0
DIZZINESS: 0
ALLERGIC/IMMUNOLOGIC NEGATIVE: 1
FEVER: 0
RESPIRATORY NEGATIVE: 1
BLURRED VISION: 0
CARDIOVASCULAR NEGATIVE: 1
SHORTNESS OF BREATH: 0
BACK PAIN: 1
ENDOCRINE NEGATIVE: 1
MUSCULOSKELETAL NEGATIVE: 1
NEUROLOGICAL NEGATIVE: 1
HEMATOLOGIC/LYMPHATIC NEGATIVE: 1
GASTROINTESTINAL NEGATIVE: 1

## 2018-10-14 ASSESSMENT — PATIENT HEALTH QUESTIONNAIRE - PHQ9
2. FEELING DOWN, DEPRESSED, IRRITABLE, OR HOPELESS: NOT AT ALL
SUM OF ALL RESPONSES TO PHQ9 QUESTIONS 1 AND 2: 0
1. LITTLE INTEREST OR PLEASURE IN DOING THINGS: NOT AT ALL

## 2018-10-14 NOTE — DISCHARGE INSTRUCTIONS
Discharge Instructions    Discharged to home by car with friend. Discharged via walking, hospital escort: Refused.  Special equipment needed: Not Applicable    Be sure to schedule a follow-up appointment with your primary care doctor or any specialists as instructed.     Discharge Plan:   Diet Plan: Discussed  Activity Level: Discussed  Influenza Vaccine Indication: Not indicated: Previously immunized this influenza season and > 8 years of age    I understand that a diet low in cholesterol, fat, and sodium is recommended for good health. Unless I have been given specific instructions below for another diet, I accept this instruction as my diet prescription.   Other diet: regular    Special Instructions: Discharge instructions for the Orthopedic Patient    Follow up with Primary Care Physician within 2 weeks of discharge to home, regarding:  Review of medications and diagnostic testing.  Surveillance for medical complications.  Workup and treatment of osteoporosis, if appropriate.     -Is this a Joint Replacement patient? No    -Is this patient being discharged with medication to prevent blood clots?  No    · Is patient discharged on Warfarin / Coumadin?   No     Depression / Suicide Risk    As you are discharged from this [facility] facility, it is important to learn how to keep safe from harming yourself.    Recognize the warning signs:  · Abrupt changes in personality, positive or negative- including increase in energy   · Giving away possessions  · Change in eating patterns- significant weight changes-  positive or negative  · Change in sleeping patterns- unable to sleep or sleeping all the time   · Unwillingness or inability to communicate  · Depression  · Unusual sadness, discouragement and loneliness  · Talk of wanting to die  · Neglect of personal appearance   · Rebelliousness- reckless behavior  · Withdrawal from people/activities they love  · Confusion- inability to concentrate     If you or a loved one  observes any of these behaviors or has concerns about self-harm, here's what you can do:  · Talk about it- your feelings and reasons for harming yourself  · Remove any means that you might use to hurt yourself (examples: pills, rope, extension cords, firearm)  · Get professional help from the community (Mental Health, Substance Abuse, psychological counseling)  · Do not be alone:Call your Safe Contact- someone whom you trust who will be there for you.  · Call your local CRISIS HOTLINE 154-9575 or 614-153-6483  · Call your local Children's Mobile Crisis Response Team [place] (722) 299-8707 or www.[employer]  · Call the toll free National Suicide Prevention Hotlines   · National Suicide Prevention Lifeline 808-987-VECI (5413)  · National Hope Line Network 800-SUICIDE (549-4110)

## 2018-10-14 NOTE — PROGRESS NOTES
Critical Care Progress Note    Date of admission  10/10/2018    Chief Complaint  58 y.o. male admitted 10/10/2018 with septic shock secondary epidural abscess with proteus bacteremia    Hospital Course   Abscess drainage by ortho, pip/tazo, initiatly received some pressors, fluids boluses. Shock resolved, now walking in hallways, tolerating po.  Interval Problem Update  Reviewed last 24 hour events:  Walking in hallways  Eating well  Passing gas but no bowel movement  Pain in back under better control  Proteus in wound pan-sensitive  Minimal drainage from wound drain (clearing)    Review of Systems  Review of Systems   Constitutional: Negative for chills, diaphoresis, fatigue and fever.   HENT: Negative.    Eyes: Negative.    Respiratory: Negative.    Cardiovascular: Negative.    Gastrointestinal: Negative.    Endocrine: Negative.    Genitourinary: Negative.    Musculoskeletal: Negative.    Skin: Negative.    Allergic/Immunologic: Negative.    Neurological: Negative.    Hematological: Negative.    All other systems reviewed and are negative.       Vital Signs for last 24 hours   Temp:  [36.9 °C (98.4 °F)-37 °C (98.6 °F)] 36.9 °C (98.4 °F)  Pulse:  [] 78  Resp:  [16] 16  BP: (112-120)/(82-87) 112/82           Physical Exam   Physical Exam   Constitutional: He is oriented to person, place, and time. He appears well-developed and well-nourished.   Walking briskly in hallwall without assistance   HENT:   Head: Normocephalic and atraumatic.   Eyes: Pupils are equal, round, and reactive to light. Conjunctivae and EOM are normal.   Neck: Normal range of motion. Neck supple.   Cardiovascular: Normal rate and regular rhythm.    Pulmonary/Chest: Effort normal and breath sounds normal.   Abdominal: Soft. Bowel sounds are normal.   Musculoskeletal: Normal range of motion.   Neurological: He is alert and oriented to person, place, and time.   Skin: Skin is warm and dry.   Psychiatric: He has a normal mood and affect. His  behavior is normal. Thought content normal.       Medications  Current Facility-Administered Medications   Medication Dose Route Frequency Provider Last Rate Last Dose   • potassium chloride SA (Kdur) tablet 20 mEq  20 mEq Oral Once Terry Estrada M.D.       • calcium carbonate (TUMS) chewable tab 500 mg  500 mg Oral DAILY Alannah Yi M.D.   500 mg at 10/14/18 0555   • ampicillin (OMNIPEN) 2,000 mg in  mL IVPB  2,000 mg Intravenous Q6HRS Terry Estrada M.D.   Stopped at 10/14/18 0625   • ondansetron (ZOFRAN) syringe/vial injection 4 mg  4 mg Intravenous Q4HRS PRN Terry Estrada M.D.   4 mg at 10/13/18 1500   • oxyCODONE immediate-release (ROXICODONE) tablet 15 mg  15 mg Oral Q3HRS PRN Alannah Yi M.D.   15 mg at 10/14/18 0417   • senna-docusate (PERICOLACE or SENOKOT S) 8.6-50 MG per tablet 2 Tab  2 Tab Oral BID Dayne Wolf M.D.   2 Tab at 10/14/18 0555    And   • polyethylene glycol/lytes (MIRALAX) PACKET 1 Packet  1 Packet Oral QDAY PRN Dayne Wolf M.D.        And   • magnesium hydroxide (MILK OF MAGNESIA) suspension 30 mL  30 mL Oral QDAY PRN Dayne Wolf M.D.        And   • bisacodyl (DULCOLAX) suppository 10 mg  10 mg Rectal QDAY PRN Dayne Wolf M.D.       • Respiratory Care per Protocol   Nebulization Continuous RT Dayne Wolf M.D.       • MD Alert...ICU Electrolyte Replacement per Pharmacy   Other pharmacy to dose Dayne Wolf M.D.       • acetaminophen (TYLENOL) tablet 650 mg  650 mg Oral Q4HRS PRN Alannah Yi M.D.   650 mg at 10/11/18 1333       Fluids    Intake/Output Summary (Last 24 hours) at 10/14/18 0816  Last data filed at 10/14/18 0625   Gross per 24 hour   Intake             1000 ml   Output               81 ml   Net              919 ml       Laboratory  Recent Results (from the past 48 hour(s))   CBC with Differential    Collection Time: 10/13/18  4:00 AM   Result Value Ref Range    WBC 8.0 4.8 -  10.8 K/uL    RBC 2.84 (L) 4.70 - 6.10 M/uL    Hemoglobin 9.3 (L) 14.0 - 18.0 g/dL    Hematocrit 26.0 (L) 42.0 - 52.0 %    MCV 91.5 81.4 - 97.8 fL    MCH 32.7 27.0 - 33.0 pg    MCHC 35.8 (H) 33.7 - 35.3 g/dL    RDW 41.8 35.9 - 50.0 fL    Platelet Count 227 164 - 446 K/uL    MPV 9.6 9.0 - 12.9 fL    Nucleated RBC 0.00 /100 WBC    NRBC (Absolute) 0.00 K/uL    Neutrophils-Polys 76.30 (H) 44.00 - 72.00 %    Lymphocytes 9.60 (L) 22.00 - 41.00 %    Monocytes 12.30 0.00 - 13.40 %    Eosinophils 1.80 0.00 - 6.90 %    Basophils 0.00 0.00 - 1.80 %    Neutrophils (Absolute) 6.10 1.82 - 7.42 K/uL    Lymphs (Absolute) 0.77 (L) 1.00 - 4.80 K/uL    Monos (Absolute) 0.98 (H) 0.00 - 0.85 K/uL    Eos (Absolute) 0.14 0.00 - 0.51 K/uL    Baso (Absolute) 0.00 0.00 - 0.12 K/uL    Hypochromia 1+     Macrocytosis 1+    Basic Metabolic Panel (BMP)    Collection Time: 10/13/18  4:00 AM   Result Value Ref Range    Sodium 127 (L) 135 - 145 mmol/L    Potassium 3.9 3.6 - 5.5 mmol/L    Chloride 94 (L) 96 - 112 mmol/L    Co2 24 20 - 33 mmol/L    Glucose 111 (H) 65 - 99 mg/dL    Bun 7 (L) 8 - 22 mg/dL    Creatinine 0.59 0.50 - 1.40 mg/dL    Calcium 7.6 (L) 8.5 - 10.5 mg/dL    Anion Gap 9.0 0.0 - 11.9   Magnesium    Collection Time: 10/13/18  4:00 AM   Result Value Ref Range    Magnesium 2.2 1.5 - 2.5 mg/dL   Phosphorus    Collection Time: 10/13/18  4:00 AM   Result Value Ref Range    Phosphorus 3.3 2.5 - 4.5 mg/dL   ESTIMATED GFR    Collection Time: 10/13/18  4:00 AM   Result Value Ref Range    GFR If African American >60 >60 mL/min/1.73 m 2    GFR If Non African American >60 >60 mL/min/1.73 m 2   PERIPHERAL SMEAR REVIEW    Collection Time: 10/13/18  4:00 AM   Result Value Ref Range    Peripheral Smear Review see below    PLATELET ESTIMATE    Collection Time: 10/13/18  4:00 AM   Result Value Ref Range    Plt Estimation Normal    MORPHOLOGY    Collection Time: 10/13/18  4:00 AM   Result Value Ref Range    RBC Morphology Present     Ovalocytes 1+     DIFFERENTIAL MANUAL    Collection Time: 10/13/18  4:00 AM   Result Value Ref Range    Manual Diff Status PERFORMED    CBC with Differential    Collection Time: 10/14/18  4:30 AM   Result Value Ref Range    WBC 8.3 4.8 - 10.8 K/uL    RBC 3.33 (L) 4.70 - 6.10 M/uL    Hemoglobin 10.4 (L) 14.0 - 18.0 g/dL    Hematocrit 31.2 (L) 42.0 - 52.0 %    MCV 93.7 81.4 - 97.8 fL    MCH 31.2 27.0 - 33.0 pg    MCHC 33.3 (L) 33.7 - 35.3 g/dL    RDW 44.4 35.9 - 50.0 fL    Platelet Count 296 164 - 446 K/uL    MPV 9.2 9.0 - 12.9 fL    Neutrophils-Polys 65.80 44.00 - 72.00 %    Lymphocytes 15.50 (L) 22.00 - 41.00 %    Monocytes 12.60 0.00 - 13.40 %    Eosinophils 4.80 0.00 - 6.90 %    Basophils 0.50 0.00 - 1.80 %    Immature Granulocytes 0.80 0.00 - 0.90 %    Nucleated RBC 0.00 /100 WBC    Neutrophils (Absolute) 5.45 1.82 - 7.42 K/uL    Lymphs (Absolute) 1.28 1.00 - 4.80 K/uL    Monos (Absolute) 1.04 (H) 0.00 - 0.85 K/uL    Eos (Absolute) 0.40 0.00 - 0.51 K/uL    Baso (Absolute) 0.04 0.00 - 0.12 K/uL    Immature Granulocytes (abs) 0.07 0.00 - 0.11 K/uL    NRBC (Absolute) 0.00 K/uL   Basic Metabolic Panel (BMP)    Collection Time: 10/14/18  4:30 AM   Result Value Ref Range    Sodium 131 (L) 135 - 145 mmol/L    Potassium 3.8 3.6 - 5.5 mmol/L    Chloride 96 96 - 112 mmol/L    Co2 25 20 - 33 mmol/L    Glucose 108 (H) 65 - 99 mg/dL    Bun 6 (L) 8 - 22 mg/dL    Creatinine 0.68 0.50 - 1.40 mg/dL    Calcium 8.4 (L) 8.5 - 10.5 mg/dL    Anion Gap 10.0 0.0 - 11.9   ESTIMATED GFR    Collection Time: 10/14/18  4:30 AM   Result Value Ref Range    GFR If African American >60 >60 mL/min/1.73 m 2    GFR If Non African American >60 >60 mL/min/1.73 m 2       Imaging      Assessment/Plan  * Sepsis (HCC)- (present on admission)   Assessment & Plan    Septic shock/severe sepsis resolved with surgical drainage and IV antibiotics. Probably reasonable to switch over to oral antibiotic (cipro or other) soon.          Surgical site infection- (present on  admission)   Assessment & Plan    Still has drain in place, drain material is clearing and decreasing, likely to pull drain soon. Consult with ortho.        Lumbar disc disease   Assessment & Plan    Serosanguinous drainage decreasing        Back pain without radiation   Assessment & Plan    OP/PT/ mobility, pain decreasing per patient slowly        Hypokalemia   Assessment & Plan    NO longer active issue        Anemia- (present on admission)   Assessment & Plan    Check folate, B12  Monitor postoperatively  No active bleed in last 24 hours    Stable        Hyponatremia- (present on admission)   Assessment & Plan    Na remains low at 127. Will free water restrict and observe. Cause unclear but not currently threatening.             VTE:  Lovenox  Ulcer: Not Indicated  Lines: None    I have performed a physical exam and reviewed and updated ROS and Plan today (10/14/2018). In review of yesterday's note (10/13/2018), there are no changes except as documented above.     Discussed patient condition and risk of morbidity and/or mortality with Hospitalist, Family, RN, RT, Therapies and Pharmacy  The patient is no longer critically and may be discharged to home within the next several days

## 2018-10-14 NOTE — DISCHARGE SUMMARY
Internal Medicine Discharge Summary  Note Author: Alannah Yi M.D.       Name Tomás Urrutia     1960   Age/Sex 58 y.o. male   MRN [id number]         Admit Date:  10/10/2018       Discharge Date:   10/14/2018    Service:   [facility] Internal Medicine [facility] Team  Attending Physician(s):   Dr. Estrada       Senior Resident(s):   Dr. Yi  Dann Resident(s):   n/a  PCP: Gregorio Chamberlain M.D.      Primary Diagnosis:   Sepsis secondary to surgical site infection  Gram negative bacteremia    Secondary Diagnoses:                Principal Problem:    Sepsis (HCC) POA: Yes  Active Problems:    Surgical site infection POA: Yes    Hyponatremia POA: Yes    Anemia POA: Yes    Hypokalemia POA: Unknown    Hypomagnesemia POA: Unknown  Resolved Problems:    Rigors POA: Yes      Hospital Summary (Brief Narrative):         Patient is a 57 y/o male with a PMH of lumbar spinal stenosis, lumbar radiculopathy, s/p trans foraminal lumbar fusion and L2-S1 decompressive laminectomy with foraminotomy on 2018  presents as a transfer from [facility] with c/o fevers, chills, nausea, vomiting and worsening low back pain.      At [facility]- he was in septic shock and purulent drainage was noted from the surgical site. Blood and wound cx were collected and he received 2lts IVF bolus, unasyn and vanco prior to transfer. Central line was placed but he didn't receive any pressors. CT lumbar spine showed epidural fluid collection/subcutaneous fluid. Transferred to [facility] for neurosurgical consultation.     In our ED- he was SIRS 3/4, was hypotensive which improved with fluids per sepsis protocol. No leukocytosis/ lactic acidosis noted but procalcitonin was elevated at 51. U/A was wnl and CXR showed no acute changes. EDP consulted orthopedic surgery and was transferred to ICU for close monitoring of hypotension and possible pressor support.     ICU course- s/p I&D with complex wound closure on 10/10 by Dr. Sanders. He was  initially started on unasyn and vancomycin. Blood and wound cx showed proteus and he was transitioned to zosyn (on 10/10). Sensitivities were back on 10/13 and he was pansensitive, was transitioned to IV ampicillin and was discharged on amoxicillin 500 tid for 2 weeks. He was noted to be hypotensive initially, which didn't improve with fluid boluses and thus required levophed on 10/10 on and off for a few hours . Pain was stable  on roxicodone and was discharged on 3 day supply of roxicodone 15mg q4hrs PRN.  Drain was removed prior to discharge. Surgery on board- recommended daily dressing changes, wound supplies given on discharge. Patient needs to f/u with orthopedic surgery on 10/17 for further recommendations.  CBC showed normocytic anemia, likely post surgical, needs to be f/u by PCP. Hypokalemia and hypomagnesemia monitored and repleted. Dulcolax suppositories were given on discharge for constipation. PT was on board- patient was able to ambulate around the hallway multiple times without support.        Patient /Hospital Summary (Details -- Problem Oriented) :          Surgical site infection   Assessment & Plan    Mid line lumbar spine surgical site erythema, edema and purulent secretion  S/p I&D and wound closure by Dr. Manzano (10/10)  Blood Cx: Proteus   Wound Cx: Proteus- pansensitive  Changed zosyn to IV ampicillin 10/13  Orthopedic surgery (Dr. Sanders) is following- no plan for 2nd I&D as pt clinically improving   Drain removed and ok for discharge per surgery  Daily dressing changes, wound consulted- no open wound noted  PT on board- patient walking all over the hallway without any support          * Sepsis (HCC)   Assessment & Plan    This is sepsis (without associated acute organ dysfunction).   SIRS 3/4 (HR, RR and Temp)  qSOFA 2/3 (RR and SBP)  S/p fluid resuscitation and empiric antibiotic therapy on admission   CT scan at OSH showed epidural abscess s/p I&D on admission by Dr. Manzano (10/10)  Blood  Cx at OSH grew GNR   Blood Cx: proteus  Wound Cx: Proteus - pansensitive  Plan:  - Changed abx from zosyn to IV ampicillin 10/13  - Transitioned to po amoxicillin before discharge for 2 weeks  - Follow up with surgery after discharge- on Wednesday  - PRN roxicodone for pain        Hypomagnesemia   Assessment & Plan    Repleted as needed         Hypokalemia   Assessment & Plan    Repleted as needed         Anemia   Assessment & Plan    Normocytic anemia  Inflammatory vs post-surgical anemia   CTM        Hyponatremia   Assessment & Plan    Improving  Likely 2/2 SIADH (recent surgery and pain)  Fluid restriction  CTM        Rigors-resolved as of 10/11/2018   Assessment & Plan    Resolved  Continue PRN tylenol            Consultants:     Orthopedic surgery Dr. Sanders    Procedures:        I&D with wound closure 10/10    Imaging/ Testing:      DX-CHEST-PORTABLE (1 VIEW)   Final Result         1.  No acute cardiopulmonary disease.            Discharge Medications:         Medication Reconciliation: Completed       Medication List      START taking these medications      Instructions   amoxicillin 500 MG Caps  Commonly known as:  AMOXIL   Take 1 Cap by mouth 3 times a day for 14 days.  Dose:  500 mg     bisacodyl 10 MG Supp  Commonly known as:  DULCOLAX   Insert 1 Suppository in rectum 1 time daily as needed (if magnesium hydroxide ineffective after 24 hours).  Dose:  10 mg        CHANGE how you take these medications      Instructions   * oxycodone 15 MG immediate release tablet  What changed:  Another medication with the same name was added. Make sure you understand how and when to take each.  Commonly known as:  OXY-IR   Take 15 mg by mouth every four hours as needed for Severe Pain.  Dose:  15 mg     * oxycodone 15 MG immediate release tablet  What changed:  You were already taking a medication with the same name, and this prescription was added. Make sure you understand how and when to take each.  Commonly known as:   OXY-IR   Take 1 Tab by mouth every four hours as needed for up to 3 days.  Dose:  15 mg        * This list has 2 medication(s) that are the same as other medications prescribed for you. Read the directions carefully, and ask your doctor or other care provider to review them with you.            CONTINUE taking these medications      Instructions   acetaminophen 500 MG Tabs  Commonly known as:  TYLENOL   Take 500 mg by mouth every 6 hours as needed for Moderate Pain.  Dose:  500 mg     cyclobenzaprine 10 MG Tabs  Commonly known as:  FLEXERIL   Take 10 mg by mouth 3 times a day as needed for Mild Pain or Moderate Pain.  Dose:  10 mg     diazePAM 5 MG Tabs  Commonly known as:  VALIUM   Take 5 mg by mouth 1 time daily as needed for Anxiety or Sleep.  Dose:  5 mg            Can use .DISCHARGEMEDSLIST if going to another facility         Disposition:   Home    Diet:   Healthy diet    Activity:   As tolerated, no lifting heavy weights    Instructions:         The patient was instructed to return to the ER in the event of worsening symptoms. I have counseled the patient on the importance of compliance and the patient has agreed to proceed with all medical recommendations and follow up plan indicated above.   The patient understands that all medications come with benefits and risks. Risks may include permanent injury or death and these risks can be minimized with close reassessment and monitoring.        Primary Care Provider:    Gregorio Chamberlain M.D.    Discharge summary faxed to primary care provider:  Deferred  Copy of discharge summary given to the patient: Deferred      Follow up appointment details :      F/u with orthopedic surgeon Dr. Sanders on 10/17    Pending Studies:        None    Time spent on discharge day patient visit, preparing discharge paperwork and arranging for patient follow up.    Summary of follow up issues:   Follow up with  for any concerns about wound .    Discharge Time (Minutes) :     >45mins  Hospital Course Type:  Inpatient Stay >2 midnights      Condition on Discharge    ______________________________________________________________________    Interval history/exam for day of discharge:    Reports pain is well controlled at the surgical site  Walking around in the hallway, low fall risk  Blood and wound cx showed  proteus  Surgery on board-drain removed, ok to discharge home  Discharged on 2 weeks of amoxicillin 500 tid  To f/u with orthopedic surgery in 3 days, on wednesday      Most Recent Labs:    Lab Results   Component Value Date/Time    WBC 8.3 10/14/2018 04:30 AM    RBC 3.33 (L) 10/14/2018 04:30 AM    HEMOGLOBIN 10.4 (L) 10/14/2018 04:30 AM    HEMATOCRIT 31.2 (L) 10/14/2018 04:30 AM    MCV 93.7 10/14/2018 04:30 AM    MCH 31.2 10/14/2018 04:30 AM    MCHC 33.3 (L) 10/14/2018 04:30 AM    MPV 9.2 10/14/2018 04:30 AM    NEUTSPOLYS 65.80 10/14/2018 04:30 AM    LYMPHOCYTES 15.50 (L) 10/14/2018 04:30 AM    MONOCYTES 12.60 10/14/2018 04:30 AM    EOSINOPHILS 4.80 10/14/2018 04:30 AM    BASOPHILS 0.50 10/14/2018 04:30 AM    HYPOCHROMIA 1+ 10/13/2018 04:00 AM      Lab Results   Component Value Date/Time    SODIUM 131 (L) 10/14/2018 04:30 AM    POTASSIUM 3.8 10/14/2018 04:30 AM    CHLORIDE 96 10/14/2018 04:30 AM    CO2 25 10/14/2018 04:30 AM    GLUCOSE 108 (H) 10/14/2018 04:30 AM    BUN 6 (L) 10/14/2018 04:30 AM    CREATININE 0.68 10/14/2018 04:30 AM      Lab Results   Component Value Date/Time    ALTSGPT 18 10/10/2018 03:55 AM    ASTSGOT 24 10/10/2018 03:55 AM    ALKPHOSPHAT 41 10/10/2018 03:55 AM    TBILIRUBIN 0.6 10/10/2018 03:55 AM    ALBUMIN 3.1 (L) 10/10/2018 03:55 AM    GLOBULIN 2.7 10/10/2018 03:55 AM    INR 1.44 (H) 10/10/2018 03:55 AM    MACROCYTOSIS 1+ 10/13/2018 04:00 AM     Lab Results   Component Value Date/Time    PROTHROMBTM 17.7 (H) 10/10/2018 03:55 AM    INR 1.44 (H) 10/10/2018 03:55 AM

## 2018-10-14 NOTE — CARE PLAN
Problem: Pain Management  Goal: Pain level will decrease to patient's comfort goal  Outcome: PROGRESSING AS EXPECTED  0-10 pain scale used to assess and monitor pt pain level. Pharm and non pharm methods of pain relief in place to enhance pt comfort.     Problem: Mobility  Goal: Risk for activity intolerance will decrease  Outcome: PROGRESSING AS EXPECTED  Mobility assessed. Pt has steady gait and is minimal fall risk. Mobility encouraged as tolerated.

## 2018-10-14 NOTE — PROGRESS NOTES
During assessment, pt hemovac noted to not be suctioned and pt dressing is saturated with serosanguinous drainage. Assessment of surgical site revealed that one of the hemovacs has been pulled almost all the way out. Site re-covered with new dressing and Dr. Pelletier office called to updated MD on call.

## 2018-10-14 NOTE — PROGRESS NOTES
[facility] Internal Medicine Interval Note    Name Tomás Urrutia     1960   Age/Sex 58 y.o. male   MRN [id number]   Code Status Full     After 5PM or if no immediate response to page, please call for cross-coverage  Attending/Team: Dr. Estrada / ISAIAS SPICER Call [phone] to page   Resident:   Dr. Yi          Chief complaint/ reason for interval visit (Primary Diagnosis)   Patient is a 59 y/o male with a PMH of low back pain s/p lumbar fusion on 18 presented as a transfer from [facility] for further evaluation of sepsis 2/2 surgical wound infection. Central line was placed prior to transfer for hypotension. CT at OSH showed an epidural collection. Patient received fluid resuscitation and empiric antibiotic therapy with vancomycin and zosyn. S/p I&D with complex wound closure (10/10)    Interval Problem Daily Status Update    Reports pain is well controlled at the surgical site  Walking around in the hallway, low fall risk  Blood and wound cx showed  proteus  Surgery on board-drain removed, ok to discharge home  Discharged on 2 weeks of amoxicillin 500 tid  To f/u with orthopedic surgery in 3 days, on wednesday      Review of Systems   Constitutional: Negative for chills and fever.   Eyes: Negative for blurred vision.   Respiratory: Negative for cough and shortness of breath.    Cardiovascular: Negative for chest pain and leg swelling.   Gastrointestinal: Positive for constipation. Negative for nausea and vomiting.   Musculoskeletal: Positive for back pain.   Neurological: Negative for dizziness.       Consultants/Specialty  Orthopedic surgery     Disposition  ICU, stable to be discharged home    Quality Measures  Quality-Core Measures   Reviewed items::  Labs reviewed, EKG reviewed, Radiology images reviewed and Medications reviewed  DVT prophylaxis - mechanical:  SCDs  Ulcer Prophylaxis::  Not indicated  Antibiotics:  Treating active infection/contamination beyond 24  hours perioperative coverage      Physical Exam       Vitals:    10/13/18 0700 10/13/18 1800 10/13/18 2000 10/14/18 0300   BP:  120/87  112/82   Pulse: 95 (!) 112  78   Resp: 16 16  16   Temp:  36.9 °C (98.4 °F) 37 °C (98.6 °F) 36.9 °C (98.4 °F)   SpO2: 97%      Weight:    77.5 kg (170 lb 13.7 oz)   Height:         Body mass index is 23.83 kg/m². Weight: 77.5 kg (170 lb 13.7 oz)  Oxygen Therapy:       Physical Exam   Constitutional: He is oriented to person, place, and time and well-developed, well-nourished, and in no distress. No distress.   Cardiovascular: Normal rate, regular rhythm and normal heart sounds.        Pulmonary/Chest: Effort normal and breath sounds normal. No respiratory distress.   Abdominal: Soft. Bowel sounds are normal. There is no tenderness.   Musculoskeletal: He exhibits tenderness.   Mild discharge on dressing on the back   Drain removed     Neurological: He is alert and oriented to person, place, and time.   Skin: He is not diaphoretic.   Psychiatric: Affect normal.         Lab Data Review:      10/11/2018  7:28 AM    Recent Labs      10/12/18   0341  10/13/18   0400  10/14/18   0430   SODIUM  130*  127*  131*   POTASSIUM  3.7  3.9  3.8   CHLORIDE  95*  94*  96   CO2  24  24  25   BUN  6*  7*  6*   CREATININE  0.60  0.59  0.68   MAGNESIUM  1.8  2.2   --    PHOSPHORUS  3.0  3.3   --    CALCIUM  7.7*  7.6*  8.4*       Recent Labs      10/12/18   0341  10/13/18   0400  10/14/18   0430   GLUCOSE  82  111*  108*       Recent Labs      10/12/18   0341  10/13/18   0400  10/14/18   0430   RBC  2.70*  2.84*  3.33*   HEMOGLOBIN  8.9*  9.3*  10.4*   HEMATOCRIT  24.8*  26.0*  31.2*   PLATELETCT  202  227  296       Recent Labs      10/12/18   0341  10/13/18   0400  10/14/18   0430   WBC  7.0  8.0  8.3   NEUTSPOLYS  71.70  76.30*  65.80   LYMPHOCYTES  11.80*  9.60*  15.50*   MONOCYTES  13.40  12.30  12.60   EOSINOPHILS  2.40  1.80  4.80   BASOPHILS  0.40  0.00  0.50           Assessment/Plan     *  Sepsis (HCC)- (present on admission)   Assessment & Plan    This is sepsis (without associated acute organ dysfunction).   SIRS 3/4 (HR, RR and Temp)  qSOFA 2/3 (RR and SBP)  S/p fluid resuscitation and empiric antibiotic therapy on admission   CT scan at OSH showed epidural abscess s/p I&D on admission by Dr. Manzano (10/10)  Blood Cx at OSH grew GNR   Blood Cx: proteus  Wound Cx: Proteus - pansensitive  Plan:  - Changed abx from zosyn to IV ampicillin 10/13  - Transitioned to po amoxicillin before discharge for 2 weeks  - Follow up with surgery after discharge- on Wednesday  - PRN roxicodone for pain        Surgical site infection- (present on admission)   Assessment & Plan    Mid line lumbar spine surgical site erythema, edema and purulent secretion  S/p I&D and wound closure by Dr. Manzano (10/10)  Blood Cx: Proteus   Wound Cx: Proteus- pansensitive  Changed zosyn to IV ampicillin 10/13  Orthopedic surgery (Dr. Sanders) is following- no plan for 2nd I&D as pt clinically improving   Drain removed and ok for discharge per surgery  Daily dressing changes, wound consulted- no open wound noted  PT on board- patient walking all over the hallway without any support          Hypomagnesemia   Assessment & Plan    Repleted as needed         Hypokalemia   Assessment & Plan    Repleted as needed         Anemia- (present on admission)   Assessment & Plan    Normocytic anemia  Inflammatory vs post-surgical anemia   CTM        Hyponatremia- (present on admission)   Assessment & Plan    Improving  Likely 2/2 SIADH (recent surgery and pain)  Fluid restriction  CTM

## 2018-10-14 NOTE — PROGRESS NOTES
Progress Note               Author: Romain Sanders Date & Time created: 10/14/2018  8:57 AM     Interval History:  Continues to improve    Review of Systems:  ROS    Physical Exam:  Physical Exam   Musculoskeletal:   Wound clean dry   S/m/i  Drainage serosanguinous with no evidence of pirulence       Labs:        Invalid input(s): [id number]      Recent Labs      10/12/18   0341  10/13/18   0400  10/14/18   0430   SODIUM  130*  127*  131*   POTASSIUM  3.7  3.9  3.8   CHLORIDE  95*  94*  96   CO2  24  24  25   BUN  6*  7*  6*   CREATININE  0.60  0.59  0.68   MAGNESIUM  1.8  2.2   --    PHOSPHORUS  3.0  3.3   --    CALCIUM  7.7*  7.6*  8.4*     Recent Labs      10/12/18   0341  10/13/18   0400  10/14/18   0430   GLUCOSE  82  111*  108*     Recent Labs      10/12/18   0341  10/13/18   0400  10/14/18   0430   RBC  2.70*  2.84*  3.33*   HEMOGLOBIN  8.9*  9.3*  10.4*   HEMATOCRIT  24.8*  26.0*  31.2*   PLATELETCT  202  227  296     Recent Labs      10/12/18   0341  10/13/18   0400  10/14/18   0430   WBC  7.0  8.0  8.3   NEUTSPOLYS  71.70  76.30*  65.80   LYMPHOCYTES  11.80*  9.60*  15.50*   MONOCYTES  13.40  12.30  12.60   EOSINOPHILS  2.40  1.80  4.80   BASOPHILS  0.40  0.00  0.50     Hemodynamics:  Temp (24hrs), Av.9 °C (98.5 °F), Min:36.9 °C (98.4 °F), Max:37 °C (98.6 °F)  Temperature: 36.9 °C (98.4 °F)  Pulse  Av.6  Min: 42  Max: 127Heart Rate (Monitored): (!) 112  Blood Pressure: 112/82    Respiratory:    Respiration: 16     Work Of Breathing / Effort: Mild  RUL Breath Sounds: Clear, RML Breath Sounds: Clear, RLL Breath Sounds: Diminished, JLUIS Breath Sounds: Clear, LLL Breath Sounds: Diminished  Fluids:    Intake/Output Summary (Last 24 hours) at 10/14/18 0857  Last data filed at 10/14/18 0625   Gross per 24 hour   Intake             1000 ml   Output               81 ml   Net              919 ml     Weight: 77.5 kg (170 lb 13.7 oz)  GI/Nutrition:  Orders Placed This Encounter   Procedures   • Diet Order  Regular     Standing Status:   Standing     Number of Occurrences:   1     Order Specific Question:   Diet:     Answer:   Regular [1]     Medical Decision Making, by Problem:  Active Hospital Problems    Diagnosis   • *Sepsis (HCC) [A41.9]   • Surgical site infection [T81.49XA]   • Hypokalemia [E87.6]   • Hypomagnesemia [E83.42]   • Hyponatremia [E87.1]   • Anemia [D64.9]       Plan:  Keep hemovac for now  Doing very well  Antibiotics as per id    Quality-Core Measures

## 2018-10-14 NOTE — PROGRESS NOTES
Spoke to Dr Sanders. Ordered to remove the drain that is coming out and leave the other drain in as long as possible.

## 2018-10-16 LAB
BACTERIA BLD CULT: NORMAL
SIGNIFICANT IND 70042: NORMAL
SITE SITE: NORMAL
SOURCE SOURCE: NORMAL

## 2018-12-05 LAB
MYCOBACTERIUM SPEC CULT: NORMAL
RHODAMINE-AURAMINE STN SPEC: NORMAL
SIGNIFICANT IND 70042: NORMAL
SITE SITE: NORMAL
SOURCE SOURCE: NORMAL

## 2018-12-13 ENCOUNTER — HOSPITAL ENCOUNTER (OUTPATIENT)
Facility: MEDICAL CENTER | Age: 58
End: 2018-12-13
Attending: FAMILY MEDICINE
Payer: MEDICAID

## 2018-12-13 ENCOUNTER — OFFICE VISIT (OUTPATIENT)
Dept: MEDICAL GROUP | Facility: CLINIC | Age: 58
End: 2018-12-13
Payer: MEDICAID

## 2018-12-13 VITALS
OXYGEN SATURATION: 94 % | SYSTOLIC BLOOD PRESSURE: 134 MMHG | RESPIRATION RATE: 18 BRPM | HEART RATE: 110 BPM | WEIGHT: 150 LBS | DIASTOLIC BLOOD PRESSURE: 80 MMHG | HEIGHT: 71 IN | BODY MASS INDEX: 21 KG/M2 | TEMPERATURE: 99.9 F

## 2018-12-13 DIAGNOSIS — M54.9 BACK PAIN WITHOUT RADIATION: ICD-10-CM

## 2018-12-13 DIAGNOSIS — D64.9 ANEMIA, UNSPECIFIED TYPE: ICD-10-CM

## 2018-12-13 DIAGNOSIS — R44.8 FEELS COLD: ICD-10-CM

## 2018-12-13 DIAGNOSIS — Z86.19 HISTORY OF SEPSIS: ICD-10-CM

## 2018-12-13 PROBLEM — T14.8XXA DISCHARGE FROM WOUND: Status: RESOLVED | Noted: 2018-09-26 | Resolved: 2018-12-13

## 2018-12-13 PROBLEM — R78.81 BACTEREMIA DUE TO OTHER BACTERIA: Status: RESOLVED | Noted: 2018-10-14 | Resolved: 2018-12-13

## 2018-12-13 PROBLEM — E83.42 HYPOMAGNESEMIA: Status: RESOLVED | Noted: 2018-10-11 | Resolved: 2018-12-13

## 2018-12-13 PROBLEM — B96.89 BACTEREMIA DUE TO OTHER BACTERIA: Status: RESOLVED | Noted: 2018-10-14 | Resolved: 2018-12-13

## 2018-12-13 PROBLEM — E87.1 HYPONATREMIA: Status: RESOLVED | Noted: 2018-10-10 | Resolved: 2018-12-13

## 2018-12-13 PROBLEM — A41.9 SEPSIS (HCC): Status: RESOLVED | Noted: 2018-10-10 | Resolved: 2018-12-13

## 2018-12-13 PROBLEM — E87.6 HYPOKALEMIA: Status: RESOLVED | Noted: 2018-10-11 | Resolved: 2018-12-13

## 2018-12-13 PROBLEM — T81.49XA SURGICAL SITE INFECTION: Status: RESOLVED | Noted: 2018-10-10 | Resolved: 2018-12-13

## 2018-12-13 LAB
BASOPHILS # BLD AUTO: 0.6 % (ref 0–1.8)
BASOPHILS # BLD: 0.04 K/UL (ref 0–0.12)
CRP SERPL HS-MCNC: 0.64 MG/DL (ref 0–0.75)
EOSINOPHIL # BLD AUTO: 0.12 K/UL (ref 0–0.51)
EOSINOPHIL NFR BLD: 1.7 % (ref 0–6.9)
ERYTHROCYTE [DISTWIDTH] IN BLOOD BY AUTOMATED COUNT: 50.4 FL (ref 35.9–50)
HCT VFR BLD AUTO: 38.9 % (ref 42–52)
HGB BLD-MCNC: 12.4 G/DL (ref 14–18)
IMM GRANULOCYTES # BLD AUTO: 0.02 K/UL (ref 0–0.11)
IMM GRANULOCYTES NFR BLD AUTO: 0.3 % (ref 0–0.9)
LYMPHOCYTES # BLD AUTO: 1.8 K/UL (ref 1–4.8)
LYMPHOCYTES NFR BLD: 26.1 % (ref 22–41)
MCH RBC QN AUTO: 30 PG (ref 27–33)
MCHC RBC AUTO-ENTMCNC: 31.9 G/DL (ref 33.7–35.3)
MCV RBC AUTO: 94.2 FL (ref 81.4–97.8)
MONOCYTES # BLD AUTO: 0.66 K/UL (ref 0–0.85)
MONOCYTES NFR BLD AUTO: 9.6 % (ref 0–13.4)
NEUTROPHILS # BLD AUTO: 4.25 K/UL (ref 1.82–7.42)
NEUTROPHILS NFR BLD: 61.7 % (ref 44–72)
NRBC # BLD AUTO: 0 K/UL
NRBC BLD-RTO: 0 /100 WBC
PLATELET # BLD AUTO: 273 K/UL (ref 164–446)
PMV BLD AUTO: 11.2 FL (ref 9–12.9)
RBC # BLD AUTO: 4.13 M/UL (ref 4.7–6.1)
TSH SERPL DL<=0.005 MIU/L-ACNC: 1.89 UIU/ML (ref 0.38–5.33)
WBC # BLD AUTO: 6.9 K/UL (ref 4.8–10.8)

## 2018-12-13 PROCEDURE — 84443 ASSAY THYROID STIM HORMONE: CPT

## 2018-12-13 PROCEDURE — 99214 OFFICE O/P EST MOD 30 MIN: CPT | Performed by: FAMILY MEDICINE

## 2018-12-13 PROCEDURE — 85025 COMPLETE CBC W/AUTO DIFF WBC: CPT

## 2018-12-13 PROCEDURE — 86140 C-REACTIVE PROTEIN: CPT

## 2018-12-13 RX ORDER — HYDROCODONE BITARTRATE AND ACETAMINOPHEN 10; 325 MG/1; MG/1
TABLET ORAL
Refills: 0 | COMMUNITY
Start: 2018-12-10 | End: 2019-09-19

## 2018-12-13 RX ORDER — AMOXICILLIN 500 MG/1
CAPSULE ORAL
Refills: 3 | COMMUNITY
Start: 2018-12-04 | End: 2019-09-19

## 2018-12-13 NOTE — ASSESSMENT & PLAN NOTE
Ever since he was d/c'd from hospital, has been feeling cold all the time. Checks his temp, running low.

## 2018-12-13 NOTE — ASSESSMENT & PLAN NOTE
Developed sepsis from his back wound about 2 weeks after I had seen him here for would check. Had seen ortho in [place] in between. Currently on amoxicillin 500 mg tod x 3 months.

## 2018-12-13 NOTE — PROGRESS NOTES
Complaint: Feeling cold     Subjective:     Tomás Urrutia is a 58 y.o. male here today for f/u.    Feels cold  Ever since he was d/c'd from hospital, has been feeling cold all the time. Checks his temp, running low, multiple times a day.    Back pain without radiation  Weaning himself off his pain meds.    Anemia  Last Hb in hosp in October was 10.4. No result available on FIT done last Summer.    History of sepsis  Developed sepsis from his back wound about 2 weeks after I had seen him here for would check. Had seen ortho in [place] in between. Currently on amoxicillin 500 mg tod x 3 months. Has recovered well.     No other concerns or complaints today.    Current medicines (including changes today)  Current Outpatient Prescriptions   Medication Sig Dispense Refill   • amoxicillin (AMOXIL) 500 MG Cap   3   • HYDROcodone/acetaminophen (NORCO)  MG Tab   0   • bisacodyl (DULCOLAX) 10 MG Suppos Insert 1 Suppository in rectum 1 time daily as needed (if magnesium hydroxide ineffective after 24 hours). 15 Suppository 0   • oxycodone (OXY-IR) 15 MG immediate release tablet Take 15 mg by mouth every four hours as needed for Severe Pain.     • diazePAM (VALIUM) 5 MG Tab Take 5 mg by mouth 1 time daily as needed for Anxiety or Sleep.     • cyclobenzaprine (FLEXERIL) 10 MG Tab Take 10 mg by mouth 3 times a day as needed for Mild Pain or Moderate Pain.     • acetaminophen (TYLENOL) 500 MG Tab Take 500 mg by mouth every 6 hours as needed for Moderate Pain.       No current facility-administered medications for this visit.      He  has a past medical history of Back pain and Numbness and tingling of left lower extremity.    Health Maintenance: needs repeat FIT test      Allergies: Patient has no known allergies.    Current Outpatient Prescriptions Ordered in [employer]   Medication Sig Dispense Refill   • amoxicillin (AMOXIL) 500 MG Cap   3   • HYDROcodone/acetaminophen (NORCO)  MG Tab   0   • bisacodyl (DULCOLAX) 10  MG Suppos Insert 1 Suppository in rectum 1 time daily as needed (if magnesium hydroxide ineffective after 24 hours). 15 Suppository 0   • oxycodone (OXY-IR) 15 MG immediate release tablet Take 15 mg by mouth every four hours as needed for Severe Pain.     • diazePAM (VALIUM) 5 MG Tab Take 5 mg by mouth 1 time daily as needed for Anxiety or Sleep.     • cyclobenzaprine (FLEXERIL) 10 MG Tab Take 10 mg by mouth 3 times a day as needed for Mild Pain or Moderate Pain.     • acetaminophen (TYLENOL) 500 MG Tab Take 500 mg by mouth every 6 hours as needed for Moderate Pain.       No current Epic-ordered facility-administered medications on file.        Past Medical History:   Diagnosis Date   • Back pain     lower back down left leg   • Numbness and tingling of left lower extremity        Past Surgical History:   Procedure Laterality Date   • IRRIGATION & DEBRIDEMENT GENERAL  10/10/2018    Procedure: IRRIGATION & DEBRIDEMENT LUMBAR WOUND;  Surgeon: Rishabh Manzano M.D.;  Location: SURGERY [facility];  Service: Orthopedics   • LUMBAR FUSION POSTERIOR  9/20/2018    Procedure: TRANSFORAMINAL LUMBAR 3 - 5  INTERBODY FUSION;  Surgeon: Romain Sanders M.D.;  Location: SURGERY [facility];  Service: Orthopedics   • LUMBAR DECOMPRESSION  9/20/2018    Procedure: LUMBAR 2- SACRAL 1   DECOMPRESSION;  Surgeon: Romain Sanders M.D.;  Location: SURGERY [facility];  Service: Orthopedics   • OTHER ORTHOPEDIC SURGERY  1974/2000    tendon surgeries left hand       Social History   Substance Use Topics   • Smoking status: Never Smoker   • Smokeless tobacco: Never Used   • Alcohol use Yes      Comment: 3 per week       Social History     Social History Narrative   • No narrative on file       Family History   Problem Relation Age of Onset   • Stroke Father    • Stroke Paternal Grandmother          ROS Positive for feeling cold, low back pain.  Patient denies any fever, chills, unintentional weight gain/loss, fatigue, stroke  "symptoms, dizziness, headache, nasal congestion, sore-throat, cough, heartburn, chest pain, difficulty breathing, abdominal discomfort, diarrhea/constipation, burning with urination or frequency, joint pain, skin rashes, depression or anxiety.       Objective:     Blood pressure 134/80, pulse (!) 110, temperature 37.7 °C (99.9 °F), temperature source Temporal, resp. rate 18, height 1.803 m (5' 11\"), weight 68 kg (150 lb), SpO2 94 %. Body mass index is 20.92 kg/m².   Physical Exam:  Constitutional: Alert, no distress.  Skin: Warm, dry, good turgor, no rashes in visible areas.  Eye: Equal, round and reactive, conjunctiva clear, lids normal.  ENMT: Lips without lesions, good dentition, oropharynx clear.  Neck: Trachea midline, no masses, no thyromegaly. No cervical or supraclavicular lymphadenopathy  Respiratory: Unlabored respiratory effort, lungs clear to auscultation, no wheezes, no ronchi.  Cardiovascular: Normal S1, S2, no murmur, no extremity edema.  Back: scar healed over, no redness or tenderness noted.   Psych: Alert and oriented x3, appropriate affect and mood.        Assessment and Plan:   The following treatment plan was discussed    1. Anemia, unspecified type  Will notify with results  - CBC WITH DIFFERENTIAL; Future      2. Feels cold  Will notify with results.   - TSH WITH REFLEX TO FT4; Future      3. Back pain without radiation  Continue to taper off meds.    4. History of sepsis  Will notify with results.  - CRP QUANTITIVE (NON-CARDIAC); Future    Will retest FIT once kits available.    Followup: Return if symptoms worsen or fail to improve.    Please note that this dictation was created using voice recognition software. I have made every reasonable attempt to correct obvious errors, but I expect that there are errors of grammar and possibly content that I did not discover before finalizing the note.           "

## 2018-12-27 ENCOUNTER — TELEPHONE (OUTPATIENT)
Dept: MEDICAL GROUP | Facility: CLINIC | Age: 58
End: 2018-12-27

## 2018-12-27 NOTE — TELEPHONE ENCOUNTER
Phone Number Called: [phone] (home)       Message: left voice message for patient to call [phone] to go over provider notes.    Left Message for patient to call back: yes

## 2018-12-27 NOTE — TELEPHONE ENCOUNTER
----- Message from Gregorio Chamberlain M.D. sent at 12/18/2018  2:32 PM PST -----  Please notify patient with lab test result. Anemia improving, continue to take Multivitamin daily. No active infection in body.    Probably not cause for feeling cold all the time.

## 2019-09-19 ENCOUNTER — HOSPITAL ENCOUNTER (OUTPATIENT)
Dept: RADIOLOGY | Facility: MEDICAL CENTER | Age: 59
DRG: 496 | End: 2019-09-19
Attending: ORTHOPAEDIC SURGERY | Admitting: ORTHOPAEDIC SURGERY
Payer: MEDICAID

## 2019-09-19 DIAGNOSIS — Z01.811 PRE-OPERATIVE RESPIRATORY EXAMINATION: ICD-10-CM

## 2019-09-19 DIAGNOSIS — Z01.812 PRE-OPERATIVE LABORATORY EXAMINATION: ICD-10-CM

## 2019-09-19 DIAGNOSIS — Z01.810 PRE-OPERATIVE CARDIOVASCULAR EXAMINATION: ICD-10-CM

## 2019-09-19 LAB
ANION GAP SERPL CALC-SCNC: 11 MMOL/L (ref 0–11.9)
APTT PPP: 30.9 SEC (ref 24.7–36)
BASOPHILS # BLD AUTO: 0.4 % (ref 0–1.8)
BASOPHILS # BLD: 0.04 K/UL (ref 0–0.12)
BUN SERPL-MCNC: 15 MG/DL (ref 8–22)
CALCIUM SERPL-MCNC: 10 MG/DL (ref 8.5–10.5)
CHLORIDE SERPL-SCNC: 99 MMOL/L (ref 96–112)
CO2 SERPL-SCNC: 27 MMOL/L (ref 20–33)
CREAT SERPL-MCNC: 0.83 MG/DL (ref 0.5–1.4)
EKG IMPRESSION: NORMAL
EOSINOPHIL # BLD AUTO: 0.02 K/UL (ref 0–0.51)
EOSINOPHIL NFR BLD: 0.2 % (ref 0–6.9)
ERYTHROCYTE [DISTWIDTH] IN BLOOD BY AUTOMATED COUNT: 50.9 FL (ref 35.9–50)
ERYTHROCYTE [SEDIMENTATION RATE] IN BLOOD BY WESTERGREN METHOD: 19 MM/HOUR (ref 0–20)
EST. AVERAGE GLUCOSE BLD GHB EST-MCNC: 114 MG/DL
GLUCOSE SERPL-MCNC: 102 MG/DL (ref 65–99)
HBA1C MFR BLD: 5.6 % (ref 0–5.6)
HCT VFR BLD AUTO: 44.9 % (ref 42–52)
HGB BLD-MCNC: 14.8 G/DL (ref 14–18)
IMM GRANULOCYTES # BLD AUTO: 0.05 K/UL (ref 0–0.11)
IMM GRANULOCYTES NFR BLD AUTO: 0.5 % (ref 0–0.9)
INR PPP: 1 (ref 0.87–1.13)
LYMPHOCYTES # BLD AUTO: 1.65 K/UL (ref 1–4.8)
LYMPHOCYTES NFR BLD: 15.1 % (ref 22–41)
MCH RBC QN AUTO: 31.4 PG (ref 27–33)
MCHC RBC AUTO-ENTMCNC: 33 G/DL (ref 33.7–35.3)
MCV RBC AUTO: 95.3 FL (ref 81.4–97.8)
MONOCYTES # BLD AUTO: 0.82 K/UL (ref 0–0.85)
MONOCYTES NFR BLD AUTO: 7.5 % (ref 0–13.4)
NEUTROPHILS # BLD AUTO: 8.34 K/UL (ref 1.82–7.42)
NEUTROPHILS NFR BLD: 76.3 % (ref 44–72)
NRBC # BLD AUTO: 0 K/UL
NRBC BLD-RTO: 0 /100 WBC
PLATELET # BLD AUTO: 297 K/UL (ref 164–446)
PMV BLD AUTO: 10 FL (ref 9–12.9)
POTASSIUM SERPL-SCNC: 4.7 MMOL/L (ref 3.6–5.5)
PROTHROMBIN TIME: 13.4 SEC (ref 12–14.6)
RBC # BLD AUTO: 4.71 M/UL (ref 4.7–6.1)
SODIUM SERPL-SCNC: 137 MMOL/L (ref 135–145)
WBC # BLD AUTO: 10.9 K/UL (ref 4.8–10.8)

## 2019-09-19 PROCEDURE — 71046 X-RAY EXAM CHEST 2 VIEWS: CPT

## 2019-09-19 PROCEDURE — 93005 ELECTROCARDIOGRAM TRACING: CPT | Performed by: ORTHOPAEDIC SURGERY

## 2019-09-19 PROCEDURE — 83036 HEMOGLOBIN GLYCOSYLATED A1C: CPT

## 2019-09-19 PROCEDURE — 85730 THROMBOPLASTIN TIME PARTIAL: CPT

## 2019-09-19 PROCEDURE — 36415 COLL VENOUS BLD VENIPUNCTURE: CPT

## 2019-09-19 PROCEDURE — 85610 PROTHROMBIN TIME: CPT

## 2019-09-19 PROCEDURE — 85652 RBC SED RATE AUTOMATED: CPT

## 2019-09-19 PROCEDURE — 85025 COMPLETE CBC W/AUTO DIFF WBC: CPT

## 2019-09-19 PROCEDURE — 80048 BASIC METABOLIC PNL TOTAL CA: CPT

## 2019-09-19 RX ORDER — HYDROCODONE BITARTRATE AND ACETAMINOPHEN 5; 325 MG/1; MG/1
1-2 TABLET ORAL EVERY 4 HOURS PRN
Status: ON HOLD | COMMUNITY
End: 2019-09-27

## 2019-09-19 RX ORDER — OXYCODONE HYDROCHLORIDE AND ACETAMINOPHEN 5; 325 MG/1; MG/1
1-2 TABLET ORAL EVERY 4 HOURS PRN
Status: ON HOLD | COMMUNITY
End: 2019-09-27

## 2019-09-19 RX ORDER — HYDROCODONE BITARTRATE AND ACETAMINOPHEN 10; 325 MG/1; MG/1
1-2 TABLET ORAL EVERY 6 HOURS PRN
COMMUNITY
End: 2019-09-19

## 2019-09-26 ENCOUNTER — APPOINTMENT (OUTPATIENT)
Dept: RADIOLOGY | Facility: MEDICAL CENTER | Age: 59
DRG: 496 | End: 2019-09-26
Attending: ORTHOPAEDIC SURGERY
Payer: MEDICAID

## 2019-09-26 ENCOUNTER — ANESTHESIA EVENT (OUTPATIENT)
Dept: SURGERY | Facility: MEDICAL CENTER | Age: 59
DRG: 496 | End: 2019-09-26
Payer: MEDICAID

## 2019-09-26 ENCOUNTER — HOSPITAL ENCOUNTER (INPATIENT)
Facility: MEDICAL CENTER | Age: 59
LOS: 1 days | DRG: 496 | End: 2019-09-27
Attending: ORTHOPAEDIC SURGERY | Admitting: ORTHOPAEDIC SURGERY
Payer: MEDICAID

## 2019-09-26 ENCOUNTER — ANESTHESIA (OUTPATIENT)
Dept: SURGERY | Facility: MEDICAL CENTER | Age: 59
DRG: 496 | End: 2019-09-26
Payer: MEDICAID

## 2019-09-26 DIAGNOSIS — G89.18 POST-OPERATIVE PAIN: ICD-10-CM

## 2019-09-26 DIAGNOSIS — M54.9 BACK PAIN WITHOUT RADIATION: ICD-10-CM

## 2019-09-26 PROCEDURE — 700112 HCHG RX REV CODE 229: Performed by: PHYSICIAN ASSISTANT

## 2019-09-26 PROCEDURE — 160036 HCHG PACU - EA ADDL 30 MINS PHASE I: Performed by: ORTHOPAEDIC SURGERY

## 2019-09-26 PROCEDURE — 503051 HCHG CLOSURE PRINEO SKIN: Performed by: ORTHOPAEDIC SURGERY

## 2019-09-26 PROCEDURE — 700111 HCHG RX REV CODE 636 W/ 250 OVERRIDE (IP): Performed by: ORTHOPAEDIC SURGERY

## 2019-09-26 PROCEDURE — 501838 HCHG SUTURE GENERAL: Performed by: ORTHOPAEDIC SURGERY

## 2019-09-26 PROCEDURE — 0QP004Z REMOVAL OF INTERNAL FIXATION DEVICE FROM LUMBAR VERTEBRA, OPEN APPROACH: ICD-10-PCS | Performed by: ORTHOPAEDIC SURGERY

## 2019-09-26 PROCEDURE — 700105 HCHG RX REV CODE 258: Performed by: ORTHOPAEDIC SURGERY

## 2019-09-26 PROCEDURE — 160042 HCHG SURGERY MINUTES - EA ADDL 1 MIN LEVEL 5: Performed by: ORTHOPAEDIC SURGERY

## 2019-09-26 PROCEDURE — 700101 HCHG RX REV CODE 250: Performed by: ANESTHESIOLOGY

## 2019-09-26 PROCEDURE — A9270 NON-COVERED ITEM OR SERVICE: HCPCS | Performed by: PHYSICIAN ASSISTANT

## 2019-09-26 PROCEDURE — 500423 HCHG DRESSING, ABD COMBINE: Performed by: ORTHOPAEDIC SURGERY

## 2019-09-26 PROCEDURE — 160048 HCHG OR STATISTICAL LEVEL 1-5: Performed by: ORTHOPAEDIC SURGERY

## 2019-09-26 PROCEDURE — 770006 HCHG ROOM/CARE - MED/SURG/GYN SEMI*

## 2019-09-26 PROCEDURE — 502000 HCHG MISC OR IMPLANTS RC 0278: Performed by: ORTHOPAEDIC SURGERY

## 2019-09-26 PROCEDURE — 700111 HCHG RX REV CODE 636 W/ 250 OVERRIDE (IP): Performed by: ANESTHESIOLOGY

## 2019-09-26 PROCEDURE — 160009 HCHG ANES TIME/MIN: Performed by: ORTHOPAEDIC SURGERY

## 2019-09-26 PROCEDURE — 700102 HCHG RX REV CODE 250 W/ 637 OVERRIDE(OP): Performed by: PHYSICIAN ASSISTANT

## 2019-09-26 PROCEDURE — 700101 HCHG RX REV CODE 250: Performed by: ORTHOPAEDIC SURGERY

## 2019-09-26 PROCEDURE — A9270 NON-COVERED ITEM OR SERVICE: HCPCS | Performed by: ANESTHESIOLOGY

## 2019-09-26 PROCEDURE — 160035 HCHG PACU - 1ST 60 MINS PHASE I: Performed by: ORTHOPAEDIC SURGERY

## 2019-09-26 PROCEDURE — 160002 HCHG RECOVERY MINUTES (STAT): Performed by: ORTHOPAEDIC SURGERY

## 2019-09-26 PROCEDURE — 500885 HCHG PACK, JACKSON TABLE: Performed by: ORTHOPAEDIC SURGERY

## 2019-09-26 PROCEDURE — 700102 HCHG RX REV CODE 250 W/ 637 OVERRIDE(OP): Performed by: ANESTHESIOLOGY

## 2019-09-26 PROCEDURE — 700111 HCHG RX REV CODE 636 W/ 250 OVERRIDE (IP): Performed by: PHYSICIAN ASSISTANT

## 2019-09-26 PROCEDURE — 700111 HCHG RX REV CODE 636 W/ 250 OVERRIDE (IP)

## 2019-09-26 PROCEDURE — 700101 HCHG RX REV CODE 250: Performed by: PHYSICIAN ASSISTANT

## 2019-09-26 PROCEDURE — A6223 GAUZE >16<=48 NO W/SAL W/O B: HCPCS | Performed by: ORTHOPAEDIC SURGERY

## 2019-09-26 PROCEDURE — 110454 HCHG SHELL REV 250: Performed by: ORTHOPAEDIC SURGERY

## 2019-09-26 PROCEDURE — 160031 HCHG SURGERY MINUTES - 1ST 30 MINS LEVEL 5: Performed by: ORTHOPAEDIC SURGERY

## 2019-09-26 PROCEDURE — 700106 HCHG RX REV CODE 271: Performed by: ORTHOPAEDIC SURGERY

## 2019-09-26 PROCEDURE — 72020 X-RAY EXAM OF SPINE 1 VIEW: CPT

## 2019-09-26 DEVICE — IMPLANTABLE DEVICE: Type: IMPLANTABLE DEVICE | Site: BACK | Status: FUNCTIONAL

## 2019-09-26 RX ORDER — CELECOXIB 200 MG/1
400 CAPSULE ORAL ONCE
Status: COMPLETED | OUTPATIENT
Start: 2019-09-26 | End: 2019-09-26

## 2019-09-26 RX ORDER — HYDROMORPHONE HYDROCHLORIDE 1 MG/ML
0.2 INJECTION, SOLUTION INTRAMUSCULAR; INTRAVENOUS; SUBCUTANEOUS
Status: DISCONTINUED | OUTPATIENT
Start: 2019-09-26 | End: 2019-09-26 | Stop reason: HOSPADM

## 2019-09-26 RX ORDER — DOCUSATE SODIUM 100 MG/1
100 CAPSULE, LIQUID FILLED ORAL 2 TIMES DAILY
Status: DISCONTINUED | OUTPATIENT
Start: 2019-09-26 | End: 2019-09-27 | Stop reason: HOSPADM

## 2019-09-26 RX ORDER — HYDRALAZINE HYDROCHLORIDE 20 MG/ML
10 INJECTION INTRAMUSCULAR; INTRAVENOUS
Status: DISCONTINUED | OUTPATIENT
Start: 2019-09-26 | End: 2019-09-27 | Stop reason: HOSPADM

## 2019-09-26 RX ORDER — ONDANSETRON 2 MG/ML
4 INJECTION INTRAMUSCULAR; INTRAVENOUS
Status: DISCONTINUED | OUTPATIENT
Start: 2019-09-26 | End: 2019-09-26 | Stop reason: HOSPADM

## 2019-09-26 RX ORDER — BISACODYL 10 MG
10 SUPPOSITORY, RECTAL RECTAL
Status: DISCONTINUED | OUTPATIENT
Start: 2019-09-26 | End: 2019-09-27 | Stop reason: HOSPADM

## 2019-09-26 RX ORDER — ONDANSETRON 4 MG/1
4 TABLET, ORALLY DISINTEGRATING ORAL EVERY 4 HOURS PRN
Status: DISCONTINUED | OUTPATIENT
Start: 2019-09-26 | End: 2019-09-27 | Stop reason: HOSPADM

## 2019-09-26 RX ORDER — DIPHENHYDRAMINE HCL 25 MG
25 TABLET ORAL EVERY 6 HOURS PRN
Status: DISCONTINUED | OUTPATIENT
Start: 2019-09-26 | End: 2019-09-27 | Stop reason: HOSPADM

## 2019-09-26 RX ORDER — HYDRALAZINE HYDROCHLORIDE 20 MG/ML
5 INJECTION INTRAMUSCULAR; INTRAVENOUS
Status: DISCONTINUED | OUTPATIENT
Start: 2019-09-26 | End: 2019-09-26 | Stop reason: HOSPADM

## 2019-09-26 RX ORDER — BUPIVACAINE HYDROCHLORIDE AND EPINEPHRINE 5; 5 MG/ML; UG/ML
INJECTION, SOLUTION EPIDURAL; INTRACAUDAL; PERINEURAL
Status: DISCONTINUED | OUTPATIENT
Start: 2019-09-26 | End: 2019-09-26 | Stop reason: HOSPADM

## 2019-09-26 RX ORDER — OXYCODONE HCL 5 MG/5 ML
10 SOLUTION, ORAL ORAL
Status: COMPLETED | OUTPATIENT
Start: 2019-09-26 | End: 2019-09-26

## 2019-09-26 RX ORDER — OXYCODONE HYDROCHLORIDE 10 MG/1
20 TABLET ORAL
Status: DISCONTINUED | OUTPATIENT
Start: 2019-09-26 | End: 2019-09-27 | Stop reason: HOSPADM

## 2019-09-26 RX ORDER — ONDANSETRON 2 MG/ML
4 INJECTION INTRAMUSCULAR; INTRAVENOUS EVERY 4 HOURS PRN
Status: DISCONTINUED | OUTPATIENT
Start: 2019-09-26 | End: 2019-09-27 | Stop reason: HOSPADM

## 2019-09-26 RX ORDER — SODIUM CHLORIDE, SODIUM LACTATE, POTASSIUM CHLORIDE, AND CALCIUM CHLORIDE .6; .31; .03; .02 G/100ML; G/100ML; G/100ML; G/100ML
IRRIGANT IRRIGATION
Status: DISCONTINUED | OUTPATIENT
Start: 2019-09-26 | End: 2019-09-26 | Stop reason: HOSPADM

## 2019-09-26 RX ORDER — SODIUM CHLORIDE, SODIUM LACTATE, POTASSIUM CHLORIDE, CALCIUM CHLORIDE 600; 310; 30; 20 MG/100ML; MG/100ML; MG/100ML; MG/100ML
INJECTION, SOLUTION INTRAVENOUS CONTINUOUS
Status: DISCONTINUED | OUTPATIENT
Start: 2019-09-26 | End: 2019-09-26 | Stop reason: HOSPADM

## 2019-09-26 RX ORDER — HALOPERIDOL 5 MG/ML
1 INJECTION INTRAMUSCULAR
Status: DISCONTINUED | OUTPATIENT
Start: 2019-09-26 | End: 2019-09-26 | Stop reason: HOSPADM

## 2019-09-26 RX ORDER — ENEMA 19; 7 G/133ML; G/133ML
1 ENEMA RECTAL
Status: DISCONTINUED | OUTPATIENT
Start: 2019-09-26 | End: 2019-09-27 | Stop reason: HOSPADM

## 2019-09-26 RX ORDER — DEXTROSE MONOHYDRATE, SODIUM CHLORIDE, AND POTASSIUM CHLORIDE 50; 1.49; 4.5 G/1000ML; G/1000ML; G/1000ML
INJECTION, SOLUTION INTRAVENOUS CONTINUOUS
Status: DISCONTINUED | OUTPATIENT
Start: 2019-09-26 | End: 2019-09-27 | Stop reason: HOSPADM

## 2019-09-26 RX ORDER — OXYCODONE HCL 10 MG/1
10 TABLET, FILM COATED, EXTENDED RELEASE ORAL ONCE
Status: COMPLETED | OUTPATIENT
Start: 2019-09-26 | End: 2019-09-26

## 2019-09-26 RX ORDER — KETOROLAC TROMETHAMINE 30 MG/ML
30 INJECTION, SOLUTION INTRAMUSCULAR; INTRAVENOUS EVERY 6 HOURS
Status: DISCONTINUED | OUTPATIENT
Start: 2019-09-26 | End: 2019-09-27 | Stop reason: HOSPADM

## 2019-09-26 RX ORDER — OXYCODONE HYDROCHLORIDE 10 MG/1
10 TABLET ORAL
Status: DISCONTINUED | OUTPATIENT
Start: 2019-09-26 | End: 2019-09-27 | Stop reason: HOSPADM

## 2019-09-26 RX ORDER — NICOTINE 21 MG/24HR
14 PATCH, TRANSDERMAL 24 HOURS TRANSDERMAL
Status: DISCONTINUED | OUTPATIENT
Start: 2019-09-27 | End: 2019-09-27 | Stop reason: HOSPADM

## 2019-09-26 RX ORDER — CEFAZOLIN SODIUM 1 G/3ML
INJECTION, POWDER, FOR SOLUTION INTRAMUSCULAR; INTRAVENOUS PRN
Status: DISCONTINUED | OUTPATIENT
Start: 2019-09-26 | End: 2019-09-26 | Stop reason: SURG

## 2019-09-26 RX ORDER — HYDROMORPHONE HYDROCHLORIDE 1 MG/ML
0.1 INJECTION, SOLUTION INTRAMUSCULAR; INTRAVENOUS; SUBCUTANEOUS
Status: DISCONTINUED | OUTPATIENT
Start: 2019-09-26 | End: 2019-09-26 | Stop reason: HOSPADM

## 2019-09-26 RX ORDER — LABETALOL HYDROCHLORIDE 5 MG/ML
5 INJECTION, SOLUTION INTRAVENOUS
Status: DISCONTINUED | OUTPATIENT
Start: 2019-09-26 | End: 2019-09-26 | Stop reason: HOSPADM

## 2019-09-26 RX ORDER — LIDOCAINE HYDROCHLORIDE 20 MG/ML
INJECTION, SOLUTION EPIDURAL; INFILTRATION; INTRACAUDAL; PERINEURAL PRN
Status: DISCONTINUED | OUTPATIENT
Start: 2019-09-26 | End: 2019-09-26 | Stop reason: SURG

## 2019-09-26 RX ORDER — DIAZEPAM 5 MG/1
5 TABLET ORAL EVERY 6 HOURS PRN
Status: DISCONTINUED | OUTPATIENT
Start: 2019-09-26 | End: 2019-09-27 | Stop reason: HOSPADM

## 2019-09-26 RX ORDER — ACETAMINOPHEN 500 MG
1000 TABLET ORAL ONCE
Status: COMPLETED | OUTPATIENT
Start: 2019-09-26 | End: 2019-09-26

## 2019-09-26 RX ORDER — SODIUM CHLORIDE, SODIUM LACTATE, POTASSIUM CHLORIDE, CALCIUM CHLORIDE 600; 310; 30; 20 MG/100ML; MG/100ML; MG/100ML; MG/100ML
INJECTION, SOLUTION INTRAVENOUS CONTINUOUS
Status: ACTIVE | OUTPATIENT
Start: 2019-09-26 | End: 2019-09-27

## 2019-09-26 RX ORDER — DEXAMETHASONE SODIUM PHOSPHATE 4 MG/ML
INJECTION, SOLUTION INTRA-ARTICULAR; INTRALESIONAL; INTRAMUSCULAR; INTRAVENOUS; SOFT TISSUE PRN
Status: DISCONTINUED | OUTPATIENT
Start: 2019-09-26 | End: 2019-09-26 | Stop reason: SURG

## 2019-09-26 RX ORDER — AMOXICILLIN 250 MG
1 CAPSULE ORAL
Status: DISCONTINUED | OUTPATIENT
Start: 2019-09-26 | End: 2019-09-27 | Stop reason: HOSPADM

## 2019-09-26 RX ORDER — HYDROMORPHONE HYDROCHLORIDE 1 MG/ML
0.4 INJECTION, SOLUTION INTRAMUSCULAR; INTRAVENOUS; SUBCUTANEOUS
Status: DISCONTINUED | OUTPATIENT
Start: 2019-09-26 | End: 2019-09-26 | Stop reason: HOSPADM

## 2019-09-26 RX ORDER — DIPHENHYDRAMINE HYDROCHLORIDE 50 MG/ML
25 INJECTION INTRAMUSCULAR; INTRAVENOUS EVERY 6 HOURS PRN
Status: DISCONTINUED | OUTPATIENT
Start: 2019-09-26 | End: 2019-09-27 | Stop reason: HOSPADM

## 2019-09-26 RX ORDER — PROCHLORPERAZINE EDISYLATE 5 MG/ML
5-10 INJECTION INTRAMUSCULAR; INTRAVENOUS EVERY 4 HOURS PRN
Status: DISCONTINUED | OUTPATIENT
Start: 2019-09-26 | End: 2019-09-27 | Stop reason: HOSPADM

## 2019-09-26 RX ORDER — DIPHENHYDRAMINE HYDROCHLORIDE 50 MG/ML
12.5 INJECTION INTRAMUSCULAR; INTRAVENOUS
Status: DISCONTINUED | OUTPATIENT
Start: 2019-09-26 | End: 2019-09-26 | Stop reason: HOSPADM

## 2019-09-26 RX ORDER — CALCIUM CARBONATE 500 MG/1
1000 TABLET, CHEWABLE ORAL 2 TIMES DAILY
Status: DISCONTINUED | OUTPATIENT
Start: 2019-09-26 | End: 2019-09-27 | Stop reason: HOSPADM

## 2019-09-26 RX ORDER — PROMETHAZINE HYDROCHLORIDE 12.5 MG/1
12.5-25 SUPPOSITORY RECTAL EVERY 4 HOURS PRN
Status: DISCONTINUED | OUTPATIENT
Start: 2019-09-26 | End: 2019-09-27 | Stop reason: HOSPADM

## 2019-09-26 RX ORDER — CEFAZOLIN SODIUM 2 G/100ML
2 INJECTION, SOLUTION INTRAVENOUS EVERY 8 HOURS
Status: COMPLETED | OUTPATIENT
Start: 2019-09-26 | End: 2019-09-27

## 2019-09-26 RX ORDER — ONDANSETRON 2 MG/ML
INJECTION INTRAMUSCULAR; INTRAVENOUS PRN
Status: DISCONTINUED | OUTPATIENT
Start: 2019-09-26 | End: 2019-09-26 | Stop reason: SURG

## 2019-09-26 RX ORDER — LABETALOL HYDROCHLORIDE 5 MG/ML
10 INJECTION, SOLUTION INTRAVENOUS
Status: DISCONTINUED | OUTPATIENT
Start: 2019-09-26 | End: 2019-09-27 | Stop reason: HOSPADM

## 2019-09-26 RX ORDER — ZOLPIDEM TARTRATE 5 MG/1
5 TABLET ORAL
Status: DISCONTINUED | OUTPATIENT
Start: 2019-09-27 | End: 2019-09-27 | Stop reason: HOSPADM

## 2019-09-26 RX ORDER — DEXAMETHASONE SODIUM PHOSPHATE 4 MG/ML
6 INJECTION, SOLUTION INTRA-ARTICULAR; INTRALESIONAL; INTRAMUSCULAR; INTRAVENOUS; SOFT TISSUE EVERY 6 HOURS PRN
Status: DISCONTINUED | OUTPATIENT
Start: 2019-09-26 | End: 2019-09-27 | Stop reason: HOSPADM

## 2019-09-26 RX ORDER — BACITRACIN 50000 [IU]/1
INJECTION, POWDER, FOR SOLUTION INTRAMUSCULAR
Status: DISCONTINUED | OUTPATIENT
Start: 2019-09-26 | End: 2019-09-26 | Stop reason: HOSPADM

## 2019-09-26 RX ORDER — ACETAMINOPHEN 500 MG
1000 TABLET ORAL EVERY 6 HOURS
Status: DISCONTINUED | OUTPATIENT
Start: 2019-09-26 | End: 2019-09-27 | Stop reason: HOSPADM

## 2019-09-26 RX ORDER — PHENYLEPHRINE HYDROCHLORIDE 10 MG/ML
INJECTION, SOLUTION INTRAMUSCULAR; INTRAVENOUS; SUBCUTANEOUS PRN
Status: DISCONTINUED | OUTPATIENT
Start: 2019-09-26 | End: 2019-09-26 | Stop reason: SURG

## 2019-09-26 RX ORDER — MIDAZOLAM HYDROCHLORIDE 1 MG/ML
INJECTION INTRAMUSCULAR; INTRAVENOUS PRN
Status: DISCONTINUED | OUTPATIENT
Start: 2019-09-26 | End: 2019-09-26 | Stop reason: SURG

## 2019-09-26 RX ORDER — OXYCODONE HCL 5 MG/5 ML
5 SOLUTION, ORAL ORAL
Status: COMPLETED | OUTPATIENT
Start: 2019-09-26 | End: 2019-09-26

## 2019-09-26 RX ORDER — VANCOMYCIN HYDROCHLORIDE 1 G/20ML
INJECTION, POWDER, LYOPHILIZED, FOR SOLUTION INTRAVENOUS
Status: COMPLETED | OUTPATIENT
Start: 2019-09-26 | End: 2019-09-26

## 2019-09-26 RX ORDER — MORPHINE SULFATE 4 MG/ML
4 INJECTION, SOLUTION INTRAMUSCULAR; INTRAVENOUS
Status: DISCONTINUED | OUTPATIENT
Start: 2019-09-26 | End: 2019-09-27 | Stop reason: HOSPADM

## 2019-09-26 RX ORDER — POLYETHYLENE GLYCOL 3350 17 G/17G
1 POWDER, FOR SOLUTION ORAL 2 TIMES DAILY PRN
Status: DISCONTINUED | OUTPATIENT
Start: 2019-09-26 | End: 2019-09-27 | Stop reason: HOSPADM

## 2019-09-26 RX ORDER — HYDROMORPHONE HYDROCHLORIDE 2 MG/ML
INJECTION, SOLUTION INTRAMUSCULAR; INTRAVENOUS; SUBCUTANEOUS PRN
Status: DISCONTINUED | OUTPATIENT
Start: 2019-09-26 | End: 2019-09-26 | Stop reason: SURG

## 2019-09-26 RX ORDER — GABAPENTIN 300 MG/1
300 CAPSULE ORAL ONCE
Status: COMPLETED | OUTPATIENT
Start: 2019-09-26 | End: 2019-09-26

## 2019-09-26 RX ORDER — AMOXICILLIN 250 MG
1 CAPSULE ORAL NIGHTLY
Status: DISCONTINUED | OUTPATIENT
Start: 2019-09-26 | End: 2019-09-27 | Stop reason: HOSPADM

## 2019-09-26 RX ORDER — PROMETHAZINE HYDROCHLORIDE 25 MG/1
12.5-25 TABLET ORAL EVERY 4 HOURS PRN
Status: DISCONTINUED | OUTPATIENT
Start: 2019-09-26 | End: 2019-09-27 | Stop reason: HOSPADM

## 2019-09-26 RX ORDER — ROCURONIUM BROMIDE 10 MG/ML
INJECTION, SOLUTION INTRAVENOUS PRN
Status: DISCONTINUED | OUTPATIENT
Start: 2019-09-26 | End: 2019-09-26 | Stop reason: SURG

## 2019-09-26 RX ORDER — LIDOCAINE HYDROCHLORIDE 10 MG/ML
INJECTION, SOLUTION EPIDURAL; INFILTRATION; INTRACAUDAL; PERINEURAL
Status: COMPLETED
Start: 2019-09-26 | End: 2019-09-26

## 2019-09-26 RX ADMIN — OXYCODONE HYDROCHLORIDE 20 MG: 10 TABLET ORAL at 22:33

## 2019-09-26 RX ADMIN — MIDAZOLAM 2 MG: 1 INJECTION INTRAMUSCULAR; INTRAVENOUS at 16:02

## 2019-09-26 RX ADMIN — CELECOXIB 400 MG: 200 CAPSULE ORAL at 14:15

## 2019-09-26 RX ADMIN — SODIUM CHLORIDE, POTASSIUM CHLORIDE, SODIUM LACTATE AND CALCIUM CHLORIDE: 600; 310; 30; 20 INJECTION, SOLUTION INTRAVENOUS at 14:19

## 2019-09-26 RX ADMIN — GABAPENTIN 300 MG: 300 CAPSULE ORAL at 14:15

## 2019-09-26 RX ADMIN — OXYCODONE HYDROCHLORIDE 10 MG: 10 TABLET, FILM COATED, EXTENDED RELEASE ORAL at 14:15

## 2019-09-26 RX ADMIN — LIDOCAINE HYDROCHLORIDE 100 MG: 20 INJECTION, SOLUTION EPIDURAL; INFILTRATION; INTRACAUDAL at 16:05

## 2019-09-26 RX ADMIN — PROPOFOL 150 MG: 10 INJECTION, EMULSION INTRAVENOUS at 16:05

## 2019-09-26 RX ADMIN — DOCUSATE SODIUM 100 MG: 100 CAPSULE, LIQUID FILLED ORAL at 20:30

## 2019-09-26 RX ADMIN — ONDANSETRON 4 MG: 2 INJECTION INTRAMUSCULAR; INTRAVENOUS at 16:10

## 2019-09-26 RX ADMIN — PHENYLEPHRINE HYDROCHLORIDE 40 MCG: 10 INJECTION INTRAVENOUS at 16:49

## 2019-09-26 RX ADMIN — CEFAZOLIN SODIUM 2 G: 2 INJECTION, SOLUTION INTRAVENOUS at 20:43

## 2019-09-26 RX ADMIN — PHENYLEPHRINE HYDROCHLORIDE 40 MCG: 10 INJECTION INTRAVENOUS at 17:14

## 2019-09-26 RX ADMIN — PHENYLEPHRINE HYDROCHLORIDE 40 MCG: 10 INJECTION INTRAVENOUS at 16:57

## 2019-09-26 RX ADMIN — KETOROLAC TROMETHAMINE 30 MG: 30 INJECTION, SOLUTION INTRAMUSCULAR at 20:31

## 2019-09-26 RX ADMIN — ANTACID TABLETS 1000 MG: 500 TABLET, CHEWABLE ORAL at 20:32

## 2019-09-26 RX ADMIN — FENTANYL CITRATE 50 MCG: 50 INJECTION INTRAMUSCULAR; INTRAVENOUS at 18:17

## 2019-09-26 RX ADMIN — ROCURONIUM BROMIDE 50 MG: 10 INJECTION, SOLUTION INTRAVENOUS at 16:05

## 2019-09-26 RX ADMIN — OXYCODONE HYDROCHLORIDE 10 MG: 5 SOLUTION ORAL at 18:11

## 2019-09-26 RX ADMIN — POTASSIUM CHLORIDE, DEXTROSE MONOHYDRATE AND SODIUM CHLORIDE: 150; 5; 450 INJECTION, SOLUTION INTRAVENOUS at 20:42

## 2019-09-26 RX ADMIN — PROPOFOL 50 MG: 10 INJECTION, EMULSION INTRAVENOUS at 16:08

## 2019-09-26 RX ADMIN — ACETAMINOPHEN 1000 MG: 500 TABLET ORAL at 20:32

## 2019-09-26 RX ADMIN — Medication 0.3 ML: at 14:19

## 2019-09-26 RX ADMIN — PHENYLEPHRINE HYDROCHLORIDE 80 MCG: 10 INJECTION INTRAVENOUS at 17:06

## 2019-09-26 RX ADMIN — PHENYLEPHRINE HYDROCHLORIDE 80 MCG: 10 INJECTION INTRAVENOUS at 16:26

## 2019-09-26 RX ADMIN — FENTANYL CITRATE 50 MCG: 50 INJECTION INTRAMUSCULAR; INTRAVENOUS at 18:11

## 2019-09-26 RX ADMIN — LIDOCAINE HYDROCHLORIDE 0.3 ML: 10 INJECTION, SOLUTION EPIDURAL; INFILTRATION; INTRACAUDAL at 14:19

## 2019-09-26 RX ADMIN — CEFAZOLIN 2 G: 330 INJECTION, POWDER, FOR SOLUTION INTRAMUSCULAR; INTRAVENOUS at 16:09

## 2019-09-26 RX ADMIN — ACETAMINOPHEN 1000 MG: 500 TABLET ORAL at 14:15

## 2019-09-26 RX ADMIN — FENTANYL CITRATE 100 MCG: 50 INJECTION, SOLUTION INTRAMUSCULAR; INTRAVENOUS at 16:05

## 2019-09-26 RX ADMIN — DEXAMETHASONE SODIUM PHOSPHATE 8 MG: 4 INJECTION, SOLUTION INTRA-ARTICULAR; INTRALESIONAL; INTRAMUSCULAR; INTRAVENOUS; SOFT TISSUE at 16:08

## 2019-09-26 RX ADMIN — SENNOSIDES, DOCUSATE SODIUM 1 TABLET: 50; 8.6 TABLET, FILM COATED ORAL at 20:30

## 2019-09-26 RX ADMIN — HYDROMORPHONE HYDROCHLORIDE 500 MCG: 2 INJECTION, SOLUTION INTRAMUSCULAR; INTRAVENOUS; SUBCUTANEOUS at 16:26

## 2019-09-26 ASSESSMENT — PAIN SCALES - GENERAL: PAIN_LEVEL: 3

## 2019-09-26 NOTE — ANESTHESIA PREPROCEDURE EVALUATION
Pt denies CP/SOB>4mets.  Denies CAD, CHF, CVA, CKD, DM2, bleeding/clotting d/o.  Denies smoking or RAD.  Denies FHX or PHx of anesthesia cxs.  Denies motion sickness.   Denies symptomatic GERD.  Discussed risks/benefits GA. Questions answered.      Relevant Problems   PULMONARY   (+) History of sepsis      NEURO   (+) History of sepsis       Physical Exam    Airway   Mallampati: II  TM distance: >3 FB  Neck ROM: full       Cardiovascular - normal exam  Rhythm: regular  Rate: normal  (-) murmur     Dental - normal exam         Pulmonary - normal exam  Breath sounds clear to auscultation     Abdominal    Neurological - normal exam                 Anesthesia Plan    ASA 2       Plan - general       Airway plan will be ETT        Induction: intravenous    Postoperative Plan: Postoperative administration of opioids is intended.    Pertinent diagnostic labs and testing reviewed    Informed Consent:    Anesthetic plan and risks discussed with patient.    Use of blood products discussed with: patient whom consented to blood products.

## 2019-09-26 NOTE — ANESTHESIA PROCEDURE NOTES
Airway  Date/Time: 9/26/2019 4:06 PM  Performed by: Reina Morataya M.D.  Authorized by: Reina Morataya M.D.     Location:  OR  Urgency:  Elective  Difficult Airway: No    Indications for Airway Management:  Anesthesia  Spontaneous Ventilation: absent    Sedation Level:  Deep  Preoxygenated: Yes    Patient Position:  Sniffing  Final Airway Type:  Endotracheal airway  Final Endotracheal Airway:  ETT  Cuffed: Yes    Technique Used for Successful ETT Placement:  Direct laryngoscopy  Insertion Site:  Oral  Blade Type:  Redd  Laryngoscope Blade/Videolaryngoscope Blade Size:  4  ETT Size (mm):  7.0  Measured from:  Teeth  ETT to Teeth (cm):  21  Placement Verified by: auscultation and capnometry    Cormack-Lehane Classification:  Grade IIa - partial view of glottis  Number of Attempts at Approach:  1   Atraumatic, DLx1, bite block placed, eyes taped, all PPP

## 2019-09-27 VITALS
BODY MASS INDEX: 22.44 KG/M2 | SYSTOLIC BLOOD PRESSURE: 112 MMHG | DIASTOLIC BLOOD PRESSURE: 74 MMHG | TEMPERATURE: 98.9 F | HEIGHT: 70 IN | OXYGEN SATURATION: 96 % | RESPIRATION RATE: 18 BRPM | WEIGHT: 156.75 LBS | HEART RATE: 81 BPM

## 2019-09-27 LAB
ERYTHROCYTE [DISTWIDTH] IN BLOOD BY AUTOMATED COUNT: 50.4 FL (ref 35.9–50)
HCT VFR BLD AUTO: 40.3 % (ref 42–52)
HGB BLD-MCNC: 13.3 G/DL (ref 14–18)
MCH RBC QN AUTO: 30.9 PG (ref 27–33)
MCHC RBC AUTO-ENTMCNC: 33 G/DL (ref 33.7–35.3)
MCV RBC AUTO: 93.7 FL (ref 81.4–97.8)
PLATELET # BLD AUTO: 240 K/UL (ref 164–446)
PMV BLD AUTO: 9.6 FL (ref 9–12.9)
RBC # BLD AUTO: 4.3 M/UL (ref 4.7–6.1)
WBC # BLD AUTO: 14.7 K/UL (ref 4.8–10.8)

## 2019-09-27 PROCEDURE — A9270 NON-COVERED ITEM OR SERVICE: HCPCS | Performed by: PHYSICIAN ASSISTANT

## 2019-09-27 PROCEDURE — 85027 COMPLETE CBC AUTOMATED: CPT

## 2019-09-27 PROCEDURE — 700112 HCHG RX REV CODE 229: Performed by: PHYSICIAN ASSISTANT

## 2019-09-27 PROCEDURE — 700111 HCHG RX REV CODE 636 W/ 250 OVERRIDE (IP): Performed by: PHYSICIAN ASSISTANT

## 2019-09-27 PROCEDURE — 36415 COLL VENOUS BLD VENIPUNCTURE: CPT

## 2019-09-27 PROCEDURE — 97165 OT EVAL LOW COMPLEX 30 MIN: CPT

## 2019-09-27 PROCEDURE — 700102 HCHG RX REV CODE 250 W/ 637 OVERRIDE(OP): Performed by: PHYSICIAN ASSISTANT

## 2019-09-27 PROCEDURE — 97161 PT EVAL LOW COMPLEX 20 MIN: CPT

## 2019-09-27 RX ORDER — CEPHALEXIN 500 MG/1
500 CAPSULE ORAL 4 TIMES DAILY
Qty: 56 CAP | Refills: 2 | Status: SHIPPED | OUTPATIENT
Start: 2019-09-27 | End: 2019-10-11

## 2019-09-27 RX ORDER — DIAZEPAM 5 MG/1
5 TABLET ORAL EVERY 6 HOURS PRN
Qty: 60 TAB | Refills: 0 | Status: SHIPPED | OUTPATIENT
Start: 2019-09-27 | End: 2019-10-12

## 2019-09-27 RX ORDER — OXYCODONE HYDROCHLORIDE 15 MG/1
15 TABLET ORAL
Qty: 56 TAB | Refills: 0 | Status: SHIPPED | OUTPATIENT
Start: 2019-09-27 | End: 2019-10-04

## 2019-09-27 RX ORDER — ONDANSETRON 4 MG/1
8 TABLET, ORALLY DISINTEGRATING ORAL EVERY 6 HOURS PRN
Qty: 20 TAB | Refills: 2 | Status: SHIPPED | OUTPATIENT
Start: 2019-09-27 | End: 2020-10-28

## 2019-09-27 RX ADMIN — CEFAZOLIN SODIUM 2 G: 2 INJECTION, SOLUTION INTRAVENOUS at 06:04

## 2019-09-27 RX ADMIN — ACETAMINOPHEN 1000 MG: 500 TABLET ORAL at 06:04

## 2019-09-27 RX ADMIN — ANTACID TABLETS 1000 MG: 500 TABLET, CHEWABLE ORAL at 06:05

## 2019-09-27 RX ADMIN — ACETAMINOPHEN 1000 MG: 500 TABLET ORAL at 01:55

## 2019-09-27 RX ADMIN — KETOROLAC TROMETHAMINE 30 MG: 30 INJECTION, SOLUTION INTRAMUSCULAR at 11:32

## 2019-09-27 RX ADMIN — OXYCODONE HYDROCHLORIDE 10 MG: 10 TABLET ORAL at 09:09

## 2019-09-27 RX ADMIN — DOCUSATE SODIUM 100 MG: 100 CAPSULE, LIQUID FILLED ORAL at 06:05

## 2019-09-27 RX ADMIN — KETOROLAC TROMETHAMINE 30 MG: 30 INJECTION, SOLUTION INTRAMUSCULAR at 06:05

## 2019-09-27 RX ADMIN — VITAMIN D, TAB 1000IU (100/BT) 5000 UNITS: 25 TAB at 06:04

## 2019-09-27 RX ADMIN — ONDANSETRON 4 MG: 2 INJECTION INTRAMUSCULAR; INTRAVENOUS at 08:01

## 2019-09-27 RX ADMIN — KETOROLAC TROMETHAMINE 30 MG: 30 INJECTION, SOLUTION INTRAMUSCULAR at 01:55

## 2019-09-27 ASSESSMENT — COGNITIVE AND FUNCTIONAL STATUS - GENERAL
SUGGESTED CMS G CODE MODIFIER MOBILITY: CK
MOVING TO AND FROM BED TO CHAIR: A LITTLE
WALKING IN HOSPITAL ROOM: A LITTLE
TURNING FROM BACK TO SIDE WHILE IN FLAT BAD: A LITTLE
STANDING UP FROM CHAIR USING ARMS: A LITTLE
DRESSING REGULAR LOWER BODY CLOTHING: A LITTLE
SUGGESTED CMS G CODE MODIFIER MOBILITY: CH
DAILY ACTIVITIY SCORE: 20
HELP NEEDED FOR BATHING: A LITTLE
DRESSING REGULAR UPPER BODY CLOTHING: A LITTLE
DAILY ACTIVITIY SCORE: 24
TOILETING: A LITTLE
MOBILITY SCORE: 18
SUGGESTED CMS G CODE MODIFIER DAILY ACTIVITY: CJ
MOBILITY SCORE: 24
SUGGESTED CMS G CODE MODIFIER DAILY ACTIVITY: CH
MOVING FROM LYING ON BACK TO SITTING ON SIDE OF FLAT BED: A LITTLE
CLIMB 3 TO 5 STEPS WITH RAILING: A LITTLE

## 2019-09-27 ASSESSMENT — GAIT ASSESSMENTS
DISTANCE (FEET): 250
GAIT LEVEL OF ASSIST: SUPERVISED
DEVIATION: OTHER (COMMENT)

## 2019-09-27 ASSESSMENT — LIFESTYLE VARIABLES
CONSUMPTION TOTAL: NEGATIVE
TOTAL SCORE: 0
EVER_SMOKED: NEVER
HAVE PEOPLE ANNOYED YOU BY CRITICIZING YOUR DRINKING: NO
ON A TYPICAL DAY WHEN YOU DRINK ALCOHOL HOW MANY DRINKS DO YOU HAVE: 0
AVERAGE NUMBER OF DAYS PER WEEK YOU HAVE A DRINK CONTAINING ALCOHOL: 0
HAVE YOU EVER FELT YOU SHOULD CUT DOWN ON YOUR DRINKING: NO
EVER HAD A DRINK FIRST THING IN THE MORNING TO STEADY YOUR NERVES TO GET RID OF A HANGOVER: NO
TOTAL SCORE: 0
ALCOHOL_USE: NO
EVER FELT BAD OR GUILTY ABOUT YOUR DRINKING: NO
TOTAL SCORE: 0
HOW MANY TIMES IN THE PAST YEAR HAVE YOU HAD 5 OR MORE DRINKS IN A DAY: 0

## 2019-09-27 ASSESSMENT — PATIENT HEALTH QUESTIONNAIRE - PHQ9
SUM OF ALL RESPONSES TO PHQ9 QUESTIONS 1 AND 2: 0
2. FEELING DOWN, DEPRESSED, IRRITABLE, OR HOPELESS: NOT AT ALL
1. LITTLE INTEREST OR PLEASURE IN DOING THINGS: NOT AT ALL

## 2019-09-27 ASSESSMENT — ACTIVITIES OF DAILY LIVING (ADL): TOILETING: INDEPENDENT

## 2019-09-27 NOTE — DISCHARGE INSTRUCTIONS
Discharge Instructions    Discharged to home by car with relative. Discharged via wheelchair, hospital escort: Yes.  Special equipment needed: Not Applicable    Be sure to schedule a follow-up appointment with your primary care doctor or any specialists as instructed.     Discharge Plan:   Diet Plan: (P) Discussed  Activity Level: (P) Discussed  Confirmed Follow up Appointment: (P) Patient to Call and Schedule Appointment  Confirmed Symptoms Management: (P) Discussed  Medication Reconciliation Updated: (P) Yes  Influenza Vaccine Indication: Patient Refuses    I understand that a diet low in cholesterol, fat, and sodium is recommended for good health. Unless I have been given specific instructions below for another diet, I accept this instruction as my diet prescription.   Other diet: Regular diet as tolerated    Special Instructions: Discharge instructions for the Orthopedic Patient    Follow up with Primary Care Physician within 2 weeks of discharge to home, regarding:  Review of medications and diagnostic testing.  Surveillance for medical complications.  Workup and treatment of osteoporosis, if appropriate.     -Is this a Joint Replacement patient? No    -Is this patient being discharged with medication to prevent blood clots?  No    · Is patient discharged on Warfarin / Coumadin?   No     Hardware Removal, Care After  Refer to this sheet in the next few weeks. These instructions provide you with information about caring for yourself after your procedure. Your health care provider may also give you more specific instructions. Your treatment has been planned according to current medical practices, but problems sometimes occur. Call your health care provider if you have any problems or questions after your procedure.  WHAT TO EXPECT AFTER THE PROCEDURE  After your procedure, it is common to have:  · Some pain.  · Nausea.  · Some swelling in the area where hardware was removed.  HOME CARE INSTRUCTIONS  · Take  medicines only as directed by your health care provider.  · Follow instructions from your health care provider about:  ¨ Rest.  ¨ Physical activity.  ¨ Bearing weight.  SEEK MEDICAL CARE IF:  · You have lasting pain.  · You are unable to perform exercises or physical activity as directed by your health care provider.  SEEK IMMEDIATE MEDICAL CARE IF:  · You have severe pain.  · You have a fever or chills.  · You have redness, heat, swelling, or pain at the site of your incision.  · You have fluid, blood, or pus coming from your incision.  · You have difficulty breathing.  · You cannot pass gas.  · You are unable to have a bowel movement within 2 days.  · You have numbness for more than 24 hours in the area where hardware was removed.     This information is not intended to replace advice given to you by your health care provider. Make sure you discuss any questions you have with your health care provider.     Document Released: 05/03/2016 Document Reviewed: 12/14/2015  NCR Interactive Patient Education ©2016 NCR Inc.    Spinal Fusion, Care After  These instructions give you information about caring for yourself after your procedure. Your doctor may also give you more specific instructions. Call your doctor if you have any problems or questions after your procedure.  Follow these instructions at home:  Medicines  · Take over-the-counter and prescription medicines only as told by your doctor. These include any medicines for pain.  · Do not drive for 24 hours if you received a sedative.  · Do not drive or use heavy machinery while taking prescription pain medicine.  · If you were prescribed an antibiotic medicine, take it as told by your doctor. Do not stop taking the antibiotic even if you start to feel better.  Surgical Cut (Incision) Care  · Follow instructions from your doctor about how to take care of your surgical cut. Make sure you:  ¨ Wash your hands with soap and water before you change your bandage  (dressing). If you cannot use soap and water, use hand .  ¨ Change your bandage as told by your doctor.  ¨ Leave stitches (sutures), skin glue, or skin tape (adhesive) strips in place. They may need to stay in place for 2 weeks or longer. If tape strips get loose and curl up, you may trim the loose edges. Do not remove tape strips completely unless your doctor says it is okay.  · Keep your surgical cut clean and dry. Do not take baths, swim, or use a hot tub until your doctor says it is okay.  · Check your surgical cut and the area around it every day for:  ¨ Redness.  ¨ Swelling.  ¨ Fluid.  Physical Activity  · Return to your normal activities as told by your doctor. Ask your doctor what activities are safe for you. Rest and protect your back as much as you can.  · Follow instructions from your doctor about how to move. Use good posture to help your spine heal.  · Do not lift anything that is heavier than 8 lb (3.6 kg) or as told by your doctor until he or she says that it is safe. Do not lift anything over your head.  · Do not twist or bend at the waist until your doctor says it is okay.  · Avoid pushing or pulling motions.  · Do not sit or lie down in the same position for long periods of time.  · Do not start to exercise until your doctor says it is okay. Ask your doctor what kinds of exercise you can do to make your back stronger.  General instructions  · If you were given a brace, use it as told by your doctor.  · Wear compression stockings as told by your doctor.  · Do not use tobacco products. These include cigarettes, chewing tobacco, or e-cigarettes. If you need help quitting, ask your doctor.  · Keep all follow-up visits as told by your doctor. This is important. This includes any visits with your physical therapist, if this applies.  Contact a doctor if:  · Your pain gets worse.  · Your medicine does not help your pain.  · Your legs or feet become painful or swollen.  · Your surgical cut is red,  swollen, or painful.  · You have fluid, blood, or pus coming from your surgical cut.  · You feel sick to your stomach (nauseous).  · You throw up (vomit).  · Your have weakness or loss of feeling (numbness) in your legs that is new or getting worse.  · You have a fever.  · You have trouble controlling when you pee (urinate) or poop (have a bowel movement).  Get help right away if:  · Your pain is very bad.  · You have chest pain.  · You have trouble breathing.  · You start to have a cough.  These symptoms may be an emergency. Do not wait to see if the symptoms will go away. Get medical help right away. Call your local emergency services (911 in the [place] Do not drive yourself to the hospital.   This information is not intended to replace advice given to you by your health care provider. Make sure you discuss any questions you have with your health care provider.  Document Released: 04/12/2012 Document Revised: 08/15/2017 Document Reviewed: 06/01/2016  [employer] Interactive Patient Education © 2017 [employer].    Cephalexin tablets or capsules  What is this medicine?  CEPHALEXIN (sef a RAMON in) is a cephalosporin antibiotic. It is used to treat certain kinds of bacterial infections It will not work for colds, flu, or other viral infections.  This medicine may be used for other purposes; ask your health care provider or pharmacist if you have questions.  COMMON BRAND NAME(S): Biocef, Daxbia, Keflex, Keftab  What should I tell my health care provider before I take this medicine?  They need to know if you have any of these conditions:  -kidney disease  -stomach or intestine problems, especially colitis  -an unusual or allergic reaction to cephalexin, other cephalosporins, penicillins, other antibiotics, medicines, foods, dyes or preservatives  -pregnant or trying to get pregnant  -breast-feeding  How should I use this medicine?  Take this medicine by mouth with a full glass of water. Follow the directions on the  prescription label. This medicine can be taken with or without food. Take your medicine at regular intervals. Do not take your medicine more often than directed. Take all of your medicine as directed even if you think you are better. Do not skip doses or stop your medicine early.  Talk to your pediatrician regarding the use of this medicine in children. While this drug may be prescribed for selected conditions, precautions do apply.  Overdosage: If you think you have taken too much of this medicine contact a poison control center or emergency room at once.  NOTE: This medicine is only for you. Do not share this medicine with others.  What if I miss a dose?  If you miss a dose, take it as soon as you can. If it is almost time for your next dose, take only that dose. Do not take double or extra doses. There should be at least 4 to 6 hours between doses.  What may interact with this medicine?  -probenecid  -some other antibiotics  This list may not describe all possible interactions. Give your health care provider a list of all the medicines, herbs, non-prescription drugs, or dietary supplements you use. Also tell them if you smoke, drink alcohol, or use illegal drugs. Some items may interact with your medicine.  What should I watch for while using this medicine?  Tell your doctor or health care professional if your symptoms do not begin to improve in a few days.  Do not treat diarrhea with over the counter products. Contact your doctor if you have diarrhea that lasts more than 2 days or if it is severe and watery.  If you have diabetes, you may get a false-positive result for sugar in your urine. Check with your doctor or health care professional.  What side effects may I notice from receiving this medicine?  Side effects that you should report to your doctor or health care professional as soon as possible:  -allergic reactions like skin rash, itching or hives, swelling of the face, lips, or tongue  -breathing  problems  -pain or trouble passing urine  -redness, blistering, peeling or loosening of the skin, including inside the mouth  -severe or watery diarrhea  -unusually weak or tired  -yellowing of the eyes, skin  Side effects that usually do not require medical attention (report to your doctor or health care professional if they continue or are bothersome):  -gas or heartburn  -genital or anal irritation  -headache  -joint or muscle pain  -nausea, vomiting  This list may not describe all possible side effects. Call your doctor for medical advice about side effects. You may report side effects to FDA at 8-886-NPW-3173.  Where should I keep my medicine?  Keep out of the reach of children.  Store at room temperature between 59 and 86 degrees F (15 and 30 degrees C). Throw away any unused medicine after the expiration date.  NOTE: This sheet is a summary. It may not cover all possible information. If you have questions about this medicine, talk to your doctor, pharmacist, or health care provider.  © 2018 Elsevier/Gold Standard (2009-03-23 17:09:13)        Depression / Suicide Risk    As you are discharged from this [facility] facility, it is important to learn how to keep safe from harming yourself.    Recognize the warning signs:  · Abrupt changes in personality, positive or negative- including increase in energy   · Giving away possessions  · Change in eating patterns- significant weight changes-  positive or negative  · Change in sleeping patterns- unable to sleep or sleeping all the time   · Unwillingness or inability to communicate  · Depression  · Unusual sadness, discouragement and loneliness  · Talk of wanting to die  · Neglect of personal appearance   · Rebelliousness- reckless behavior  · Withdrawal from people/activities they love  · Confusion- inability to concentrate     If you or a loved one observes any of these behaviors or has concerns about self-harm, here's what you can do:  · Talk about it- your  feelings and reasons for harming yourself  · Remove any means that you might use to hurt yourself (examples: pills, rope, extension cords, firearm)  · Get professional help from the community (Mental Health, Substance Abuse, psychological counseling)  · Do not be alone:Call your Safe Contact- someone whom you trust who will be there for you.  · Call your local CRISIS HOTLINE 444-1920 or 372-068-7642  · Call your local Children's Mobile Crisis Response Team [place] (192) 182-0961 or www.[employer]we[employer]  · Call the toll free National Suicide Prevention Hotlines   · National Suicide Prevention Lifeline 118-384-JDHL (7705)  · National Hope Line Network 800-SUICIDE (957-8894)

## 2019-09-27 NOTE — DISCHARGE PLANNING
Patient is eligible for Medicaid Meds to Beds at discharge Monday-Friday 8 a.m. - 4 p.m. Preferred pharmacy changed to [facility]. Please call [phone] prior to discharge.

## 2019-09-27 NOTE — ANESTHESIA POSTPROCEDURE EVALUATION
Patient: Tomás Urrutia    Procedure Summary     Date:  09/26/19 Room / Location:  [facility] 06 / SURGERY [facility]    Anesthesia Start:  1602 Anesthesia Stop:  1755    Procedure:  FUSION, SPINE, LUMBAR, PLIF- L3-5 FUSION EXPLORATION AND DEEP HARDWARE REMOVAL (N/A Back) Diagnosis:  (PAINFUL HARDWARE)    Surgeon:  Romain Sanders M.D. Responsible Provider:  Reina Morataya M.D.    Anesthesia Type:  general ASA Status:  2          Final Anesthesia Type: general  Last vitals  BP   NIBP: 129/94    Temp   36.3 °C (97.3 °F)    Pulse   Pulse: 96   Resp   16    SpO2   95 %      Anesthesia Post Evaluation    Patient location during evaluation: PACU  Patient participation: complete - patient participated  Level of consciousness: awake and alert  Pain score: 3    Airway patency: patent  Anesthetic complications: no  Cardiovascular status: hemodynamically stable  Respiratory status: acceptable  Hydration status: euvolemic    PONV: none

## 2019-09-27 NOTE — PROGRESS NOTES
2 RN skin check completed with Larissa RN. No skin breakdown noted, patient has dressing to lumbar region which is clean dry and intact. SCD on and patient has ability to turn self freely, he does so frequently.

## 2019-09-27 NOTE — PROGRESS NOTES
Patient mobilizing self back from bathroom, educated to call with mobilization. Patient up steady with standby and no equipment but IV pole. Patient has no needs or complaints aside from requesting nausea med with breakfast. Call light at bedside.

## 2019-09-27 NOTE — OR NURSING
HARDWARE REMOVED: 6 SCREWS, 6 SET SCREWS, 1 CROSSLINK, 2 RODS. SENT TO DECONTAMINATION PER REQUEST OF HARDWARE BY PATIENT.

## 2019-09-27 NOTE — OP REPORT
DATE OF SERVICE:  09/26/2019    SERVICE:  Orthopedic spine surgery.    PREOPERATIVE DIAGNOSES:  1.  Status post L3-L5 posterior instrumented fusion.  2.  Status post wound infection after L3-5 fusion approximately 1 year ago   with aggressive debridement.  3.  Painful hardware, L3-5.    POSTOPERATIVE DIAGNOSES:  1.  Status post L3-L5 posterior instrumented fusion.  2.  Status post wound infection after L3-5 fusion approximately 1 year ago   with aggressive debridement.  3.  Painful hardware, L3-5.  4.  Posterior pseudarthrosis    PROCEDURES:  1.  L3-L5 deep segmental hardware removal.  2.  Fusion exploration.  3.  Thorough irrigation and debridement.  4.  C-arm fluoroscopy.    SURGEON:  Romain Sanders MD    ASSISTANT SURGEON:  Logan Kim PA-C, CFA    ANESTHESIOLOGIST:  Reina Morataya MD    ANESTHESIA:  General anesthetic.    COMPLICATIONS:  None.    BLOOD LOSS:  30 mL.    HISTORY AND INDICATIONS:  The patient is a 59-year-old gentleman with chief   complaint of back pain in the area of the previously placed hardware.    Approximately 1 year ago, he had a decompression and fusion, L3-L5.  Within a   couple of weeks postoperatively, he developed a postoperative wound infection,   which was thoroughly and aggressively debrided.  Because of this aggressive   debridement, there was very little bone graft left posteriorly.  The fusion   also included an anterior interbody fusion, which was thought to be solid;   however, due to the aggressive debridement and irrigation due to the   postoperative wound infection, the patient had a suspected pseudarthrosis   posteriorly, but an intact fusion anteriorly.    DESCRIPTION OF PROCEDURE:  After informed consent was obtained, the patient   was brought to the operating room and given general endotracheal anesthetic.    He was placed prone on the operating room table and given preoperative IV   Ancef.  A timeout was called correctly identifying the patient and the   procedure to be  done.  We then made a longitudinal midline incision localizing   our incision using C-arm fluoroscopy.  We carefully dissected down through   subcutaneous tissue down to the fascial layer.  The fascial layer was then   carefully cleaned off with a Hearn elevator and incised.  We then dissected   down to the crosslink.  We then skeletonized the hardware resecting on both   the left and the right side.    We then carefully removed the hardware.  We then inspected the fusion site.    There was really very little bone graft and bone formation in the lateral   gutters due to the previous aggressive debridement after the postoperative   wound infection.    However, the segments appeared to be stable likely because of the cage fusion   anteriorly.  We then thoroughly debrided the entire area.  We also did 2   Betadine washes and irrigated with copious amounts of pulsatile lavage with   bacitracin.  We removed any nonviable-looking tissue and exposed good bony   tissue deep.  After thoroughly irrigating, debriding and washing out with   Betadine for approximately 15 minutes, we then inspected the wound.  I then   placed beta-tricalcium phosphate bone graft in demineralized bone matrix in   the lateral gutters to fill the void left by the hardware.  There was clear   pseudoarthrosis as there was no bridging bone secondary to aggressive   debridement after the initial fusion for postoperative wound infection.  We   then placed 1000 mg of vancomycin distributed equally below and above the   fascia.  The fascial layer was closed with #1 Vicryl suture, subcutaneous   tissue with 2-0 Vicryl and skin was closed with a Dermabond mesh Prineo glue   closure.  We did inject 10 mL of 0.5% Marcaine with epinephrine into the   wound, which was then dressed.  The procedure was terminated.  No   complications were experienced.  Blood loss was approximately 30 mL.  The   patient was aroused from general anesthesia and brought in stable  condition to   the recovery room.       ____________________________________     MD FANNY HUSTON / WEST    DD:  09/26/2019 17:32:16  DT:  09/26/2019 19:23:59    D#:  [id number]  Job#:  [id number]

## 2019-09-27 NOTE — THERAPY
"Physical Therapy Evaluation completed.   Bed Mobility:  Supine to Sit: Supervised  Transfers: Sit to Stand: Supervised  Gait: Level Of Assist: Supervised with No Equipment Needed       Plan of Care: Patient with no further skilled PT needs in the acute care setting at this time  Discharge Recommendations: Equipment: No Equipment Needed. Recommend outpatient physical therapy services to address higher level deficits.    See \"Rehab Therapy-Acute\" Patient Summary Report for complete documentation.     Pt was seen s/p L3-L5 hardware removal, fusion, and I&D. Pt has spinal precautions and restriction of 10lbs for lifting at this time. Pt was able to demonstrate SPV for all functional mobility with no AD use. Pt able to maintain spinal precautions throughout session and during functional mobility. Pt was able to complete going up/down steps with appropriate mechanics. Pt educated on spinal precautions and walking program to assist with swelling and healing. Pt is in no acute skilled PT needs at this time, encouarged to continue mobilizing with [facility] staff. Anticipate pt to d/c home with friend support and recs for OP therapy services.   "

## 2019-09-27 NOTE — OR SURGEON
Immediate Post OP Note    PreOp Diagnosis: Painful hardware after l3-5 fusion     PostOp Diagnosis: same with posterior pseudoarthrosis    Procedure(s):  - L3-5 FUSION EXPLORATION AND DEEP HARDWARE REMOVAL - Wound Class: Clean    Surgeon(s):  Romain Sanders M.D.    Anesthesiologist/Type of Anesthesia:  Anesthesiologist: Reina Morataya M.D./General    Surgical Staff:  Assistant: SERGIO Hopper  Circulator: Genoveva Huffman R.N.  Relief Circulator: Teressa Campbell R.N.  Scrub Person: Sheela Arteaga  [place]: Teressa Campbell R.N.  Radiology Technologist: Anne Marie Young    Specimens removed if any:  none    Estimated Blood Loss: 30cc    Findings: pseudoarthrosis    Complications: none        9/26/2019 5:24 PM Romain Sanders M.D.

## 2019-09-27 NOTE — ANESTHESIA TIME REPORT
Anesthesia Start and Stop Event Times     Date Time Event    9/26/2019 1534 Ready for Procedure     1602 Anesthesia Start     1755 Anesthesia Stop        Responsible Staff  09/26/19    Name Role Begin End    Reina Morataya M.D. Anesth 1602 1755        Preop Diagnosis (Free Text):  Pre-op Diagnosis     PAINFUL HARDWARE        Preop Diagnosis (Codes):    Post op Diagnosis  Painful orthopaedic hardware (HCC)      Premium Reason  A. 3PM - 7AM    Comments:

## 2019-09-27 NOTE — THERAPY
"Occupational Therapy Evaluation completed.   Functional Status: OT eval completed on 58 YO M s/p L3-5 fusion and hardware removal. Pt SPV for functional transfers and BADls. Pt reports this is his 3rd back surgery and is aware of spinal precautions and has all the DME he needs for BADLs in home setting. Pt reports friends will be available to provide assist as needed upon d/c with IADls. Pt does not require acute OT services.  Plan of Care: Patient with no further skilled OT needs in the acute care setting at this time  Discharge Recommendations:  Equipment: No Equipment Needed. Anticipate that the patient will have no further occupational therapy needs after discharge from the hospital.     See \"Rehab Therapy-Acute\" Patient Summary Report for complete documentation.    "

## 2019-09-27 NOTE — PROGRESS NOTES
Pt discharged home with family member. PIV removed. Scripts given to pt. Discharge instructions reviewed. No further questions. Pt will be escorted downstairs with staff via wheelchair.

## 2019-09-27 NOTE — DIETARY
Nutrition Services: Day 1 of admit.  Tomás Urrutia is a 59 y.o. male with admitting DX of Painful orthopaedic hardware   Consult received for Wt Loss Unsure on Nutrition Admit Screen (MST 2)    Pt states he had to fast all day yesterday for 3 pm surgery so he didn't eat until after his procedure. Pt reports friend brought in food. Pt states he has thrown up 2x overnight. Pt states he ate some breakfast this morning and is doing okay. Pt reports prior to admit he was eating well. Pt states his wt has been stable and reports he may have lost a little bit due to pain. Pt reports his UBW at 155 lbs (70.5 kg). Discussed snack options, added yogurt once a day per pt preference.     Assessment:  Ht: 177.8 cm, Wt 9/26: 71.1 kg via stand up scale, BMI: 22.49 - Normal  Diet/Intake: Regular - Per chart pt PO % 1 meal documented     Evaluation:   1. Pt appears adequately nourished.   2. No wt loss noted from pt's reported UBW.   3. Meds: tums, colace, toradol, pericolace, vitamin D  4. Fluids: Dextrose 5% and 0.45% NaCl with KCl 20 mEq @ 100 mL/hr  5. Last BM: 9/26    Malnutrition Risk: No criteria noted at this time.    Recommendations/Plan:  1. Encourage intake of meals  2. Document intake of all meals as % taken in ADL's to provide interdisciplinary communication across all shifts.   3. Monitor weight.  4. Nutrition rep will continue to see patient for ongoing meal and snack preferences.  5. Obtain supplement order per RD as needed.    RD following

## 2020-03-16 ENCOUNTER — HOSPITAL ENCOUNTER (OUTPATIENT)
Dept: LAB | Facility: MEDICAL CENTER | Age: 60
End: 2020-03-16
Attending: ORTHOPAEDIC SURGERY
Payer: MEDICAID

## 2020-03-16 ENCOUNTER — HOSPITAL ENCOUNTER (OUTPATIENT)
Dept: RADIOLOGY | Facility: MEDICAL CENTER | Age: 60
End: 2020-03-16
Attending: ORTHOPAEDIC SURGERY
Payer: MEDICAID

## 2020-03-16 DIAGNOSIS — M54.50 LOW BACK PAIN, UNSPECIFIED BACK PAIN LATERALITY, UNSPECIFIED CHRONICITY, UNSPECIFIED WHETHER SCIATICA PRESENT: ICD-10-CM

## 2020-03-16 LAB
BUN SERPL-MCNC: 19 MG/DL (ref 8–22)
CREAT SERPL-MCNC: 0.89 MG/DL (ref 0.5–1.4)

## 2020-03-16 PROCEDURE — 36415 COLL VENOUS BLD VENIPUNCTURE: CPT

## 2020-03-16 PROCEDURE — 82565 ASSAY OF CREATININE: CPT

## 2020-03-16 PROCEDURE — 72158 MRI LUMBAR SPINE W/O & W/DYE: CPT

## 2020-03-16 PROCEDURE — 700117 HCHG RX CONTRAST REV CODE 255: Performed by: ORTHOPAEDIC SURGERY

## 2020-03-16 PROCEDURE — A9576 INJ PROHANCE MULTIPACK: HCPCS | Performed by: ORTHOPAEDIC SURGERY

## 2020-03-16 PROCEDURE — 84520 ASSAY OF UREA NITROGEN: CPT

## 2020-03-16 RX ADMIN — GADOTERIDOL 15 ML: 279.3 INJECTION, SOLUTION INTRAVENOUS at 14:59

## 2020-10-28 ENCOUNTER — APPOINTMENT (OUTPATIENT)
Dept: MEDICAL GROUP | Facility: PHYSICIAN GROUP | Age: 60
End: 2020-10-28

## 2020-10-28 ENCOUNTER — OFFICE VISIT (OUTPATIENT)
Dept: URGENT CARE | Facility: PHYSICIAN GROUP | Age: 60
End: 2020-10-28
Payer: MEDICAID

## 2020-10-28 VITALS
WEIGHT: 172 LBS | TEMPERATURE: 98 F | HEART RATE: 82 BPM | BODY MASS INDEX: 24.68 KG/M2 | DIASTOLIC BLOOD PRESSURE: 70 MMHG | SYSTOLIC BLOOD PRESSURE: 116 MMHG | RESPIRATION RATE: 14 BRPM | OXYGEN SATURATION: 99 %

## 2020-10-28 DIAGNOSIS — D48.9 NEOPLASM OF UNCERTAIN BEHAVIOR: Primary | ICD-10-CM

## 2020-10-28 PROCEDURE — 99214 OFFICE O/P EST MOD 30 MIN: CPT | Performed by: PHYSICIAN ASSISTANT

## 2020-10-28 ASSESSMENT — ENCOUNTER SYMPTOMS
CONSTIPATION: 0
CHILLS: 0
COUGH: 0
SHORTNESS OF BREATH: 0
DIARRHEA: 0
VOMITING: 0
ABDOMINAL PAIN: 0
ROS SKIN COMMENTS: SKIN LESION
NAUSEA: 0
FEVER: 0

## 2020-10-28 NOTE — PROGRESS NOTES
Subjective:   Tomás Urrutia is a 60 y.o. male who presents for Bump (on R under jaw  had it for couple years growing/now red and hollow)        HPI   Patient presents to urgent care today for a bump on his right jaw.  He noticed a small red spot area years ago.  The area will become inflamed sometimes scab and bleed.  Over the past few months it has grown in size.  He states the bump will get irritated after shaving and sometimes bleed.  The patient does not have any history of skin cancer or melanoma.  He does have previous history of actinic keratosis treated with liquid nitrogen.  He does note significant sun exposure over the years working as an .  He has not tried to treat the area.  No other aggravating or alleviating factors.    He does not currently have a primary care provider or dermatologist.      Review of Systems   Constitutional: Negative for chills, fever and malaise/fatigue.   Respiratory: Negative for cough and shortness of breath.    Gastrointestinal: Negative for abdominal pain, constipation, diarrhea, nausea and vomiting.   Skin: Negative for rash.        skin lesion   All other systems reviewed and are negative.      PMH:  has a past medical history of Back pain, Hypertension, and Numbness and tingling of left lower extremity.  MEDS:   Current Outpatient Medications:   •  acetaminophen (TYLENOL) 500 MG Tab, Take 500 mg by mouth every 6 hours as needed for Moderate Pain., Disp: , Rfl:   ALLERGIES: No Known Allergies  SURGHX:   Past Surgical History:   Procedure Laterality Date   • LUMBAR FUSION POSTERIOR N/A 9/26/2019    Procedure: FUSION, SPINE, LUMBAR, PLIF- L3-5 FUSION EXPLORATION AND DEEP HARDWARE REMOVAL;  Surgeon: Romain Sanders M.D.;  Location: SURGERY [facility];  Service: Orthopedics   • IRRIGATION & DEBRIDEMENT GENERAL  10/10/2018    Procedure: IRRIGATION & DEBRIDEMENT LUMBAR WOUND;  Surgeon: Rishabh Manzano M.D.;  Location: [facility];  Service:  Orthopedics   • LUMBAR FUSION POSTERIOR  9/20/2018    Procedure: TRANSFORAMINAL LUMBAR 3 - 5  INTERBODY FUSION;  Surgeon: Romain Sanders M.D.;  Location: SURGERY [facility], [place];  Service: Orthopedics   • LUMBAR DECOMPRESSION  9/20/2018    Procedure: LUMBAR 2- SACRAL 1   DECOMPRESSION;  Surgeon: Romain Sanders M.D.;  Location: SURGERY [facility] [place];  Service: Orthopedics   • OTHER ORTHOPEDIC SURGERY  1974/2000    tendon surgeries left hand     SOCHX:  reports that he has never smoked. He has never used smokeless tobacco. He reports previous alcohol use. He reports that he does not use drugs.  Family History   Problem Relation Age of Onset   • Stroke Father    • Stroke Paternal Grandmother         Objective:   /70 (BP Location: Left arm, Patient Position: Sitting, BP Cuff Size: Adult)   Pulse 82   Temp 36.7 °C (98 °F) (Temporal)   Resp 14   Wt 78 kg (172 lb)   SpO2 99%   BMI 24.68 kg/m²     Physical Exam  Vitals signs reviewed.   Constitutional:       General: He is not in acute distress.     Appearance: He is well-developed.   HENT:      Head: Normocephalic and atraumatic.        Right Ear: External ear normal.      Left Ear: External ear normal.      Nose: Nose normal.      Mouth/Throat:      Mouth: Mucous membranes are moist.   Eyes:      Conjunctiva/sclera: Conjunctivae normal.      Pupils: Pupils are equal, round, and reactive to light.   Neck:      Musculoskeletal: Normal range of motion and neck supple.      Trachea: No tracheal deviation.   Cardiovascular:      Rate and Rhythm: Normal rate and regular rhythm.   Pulmonary:      Effort: Pulmonary effort is normal.      Breath sounds: Normal breath sounds.   Skin:     General: Skin is warm and dry.      Capillary Refill: Capillary refill takes less than 2 seconds.   Neurological:      General: No focal deficit present.      Mental Status: He is alert and oriented to person, place, and time.   Psychiatric:         Mood and Affect: Mood normal.          Behavior: Behavior normal.           Assessment/Plan:     1. Neoplasm of uncertain behavior  REFERRAL TO FOLLOW-UP WITH PRIMARY CARE    REFERRAL TO DERMATOLOGY     Supportive care reviewed, continue to monitor area.  Follow-up with dermatology or primary care for biopsy.    Follow-up with primary care provider within 7-10 days, referral placed.  If symptoms worsen or persist patient can return to clinic for reevaluation. Patient confirmed understanding of information.    Please note that this dictation was created using voice recognition software. I have made every reasonable attempt to correct obvious errors, but I expect that there are errors of grammar and possibly content that I did not discover before finalizing the note.

## 2022-06-12 NOTE — CARE PLAN
Problem: Venous Thromboembolism (VTW)/Deep Vein Thrombosis (DVT) Prevention:  Goal: Patient will participate in Venous Thrombosis (VTE)/Deep Vein Thrombosis (DVT)Prevention Measures  Outcome: PROGRESSING AS EXPECTED  Pt has SCDs on when in bed and ambulating frequently     Problem: Discharge Barriers/Planning  Goal: Patient's continuum of care needs will be met  Outcome: PROGRESSING AS EXPECTED  Pt needs home pain medications and order. MD sanchez is aware and coming to round at 1400       no
